# Patient Record
Sex: MALE | Race: WHITE | NOT HISPANIC OR LATINO | Employment: OTHER | URBAN - METROPOLITAN AREA
[De-identification: names, ages, dates, MRNs, and addresses within clinical notes are randomized per-mention and may not be internally consistent; named-entity substitution may affect disease eponyms.]

---

## 2017-01-23 ENCOUNTER — OFFICE VISIT (OUTPATIENT)
Dept: UROLOGY | Facility: CLINIC | Age: 77
End: 2017-01-23
Attending: UROLOGY
Payer: COMMERCIAL

## 2017-01-23 VITALS
SYSTOLIC BLOOD PRESSURE: 134 MMHG | WEIGHT: 130 LBS | HEIGHT: 63 IN | HEART RATE: 76 BPM | BODY MASS INDEX: 23.04 KG/M2 | DIASTOLIC BLOOD PRESSURE: 78 MMHG

## 2017-01-23 DIAGNOSIS — N13.30 HYDRONEPHROSIS, LEFT: ICD-10-CM

## 2017-01-23 DIAGNOSIS — C61 PROSTATE CANCER: Primary | ICD-10-CM

## 2017-01-23 PROCEDURE — 1126F AMNT PAIN NOTED NONE PRSNT: CPT | Mod: S$GLB,,, | Performed by: UROLOGY

## 2017-01-23 PROCEDURE — 3078F DIAST BP <80 MM HG: CPT | Mod: S$GLB,,, | Performed by: UROLOGY

## 2017-01-23 PROCEDURE — 99213 OFFICE O/P EST LOW 20 MIN: CPT | Mod: S$GLB,,, | Performed by: UROLOGY

## 2017-01-23 PROCEDURE — 1159F MED LIST DOCD IN RCRD: CPT | Mod: S$GLB,,, | Performed by: UROLOGY

## 2017-01-23 PROCEDURE — 1160F RVW MEDS BY RX/DR IN RCRD: CPT | Mod: S$GLB,,, | Performed by: UROLOGY

## 2017-01-23 PROCEDURE — 99999 PR PBB SHADOW E&M-EST. PATIENT-LVL III: CPT | Mod: PBBFAC,,, | Performed by: UROLOGY

## 2017-01-23 PROCEDURE — 1157F ADVNC CARE PLAN IN RCRD: CPT | Mod: S$GLB,,, | Performed by: UROLOGY

## 2017-01-23 PROCEDURE — 3075F SYST BP GE 130 - 139MM HG: CPT | Mod: S$GLB,,, | Performed by: UROLOGY

## 2017-01-23 NOTE — PROGRESS NOTES
"Subjective:      Aroldo Johnston Jr. is a 76 y.o. male who returns today regarding his prostate cancer, UPJ obstruction, stones.    He started ADT earlier this year; last [place] 3/3/16. He has no c/o today. Has also seen Dr. Caputo in interim and has opted for intermittent ADT.    Prostate Cancer  He states that he was diagnosed with prostate cancer in 2004 at [facility]; pT1c, [place] 3+3=6, PSA 16.5.  He states he was treated with radiation therapy with no known recurrence.  PSA sachin 0.97 in March 2009.  PSA slowly began to rise in 2011 and then more rapidly to 4.9 in Aug 2015, when he was referred to me.  He is here today to review new PSA.  He continues to deny bone pain and voiding problems.    UPJ Obstruction  He was diagnosed w/ left UPJ obstruction in 2014 after presenting with large renal stone.  He had robotic pyeloplasty in August 2014.  He did not FU for stent removal, which was noted on CT in September 2015.  Stent exchanged in OR on 9/24/15 and subsequently removed in clinic.  FU US demonstrated stable, likely chronic hydronephrosis.    The following portions of the patient's history were reviewed and updated as appropriate: allergies, current medications, past family history, past medical history, past social history, past surgical history and problem list.    Review of Systems  A comprehensive multipoint review of systems was negative except as otherwise stated in the HPI.     Objective:   Vitals:   Visit Vitals    /78 (BP Location: Right arm, Patient Position: Sitting, BP Method: Automatic)    Pulse 76    Ht 5' 3" (1.6 m)    Wt 59 kg (130 lb)    BMI 23.03 kg/m2       Physical Exam   General: alert and oriented, no acute distress  Respiratory: Symmetric expansion, non-labored breathing  Cardiovascular: regular rate and rhythm, no peripheral edema  Neuro: no gross deficits; poor memory  Psych: normal judgment and insight, normal mood/affect and non-anxious    Lab Review     Lab Results "   Component Value Date    PSA 4.9 (H) 08/19/2015    PSADIAG 0.03 09/16/2016       Assessment and Plan:   Prostate cancer  Elevated PSA  -- C/w biochemical recurrence s/p XRT  -- Has opted for intermittent ADT  -- FU in 2 months w/ repeat PSA    Hydronephrosis, left  -- S/p left UPJO w/ stable hydro on US following stent removal

## 2017-01-30 ENCOUNTER — TELEPHONE (OUTPATIENT)
Dept: GASTROENTEROLOGY | Facility: CLINIC | Age: 77
End: 2017-01-30

## 2017-02-01 ENCOUNTER — LAB VISIT (OUTPATIENT)
Dept: LAB | Facility: HOSPITAL | Age: 77
End: 2017-02-01
Attending: INTERNAL MEDICINE
Payer: COMMERCIAL

## 2017-02-01 ENCOUNTER — OFFICE VISIT (OUTPATIENT)
Dept: GASTROENTEROLOGY | Facility: CLINIC | Age: 77
End: 2017-02-01
Payer: COMMERCIAL

## 2017-02-01 VITALS
BODY MASS INDEX: 23.91 KG/M2 | DIASTOLIC BLOOD PRESSURE: 73 MMHG | HEART RATE: 71 BPM | WEIGHT: 134.94 LBS | SYSTOLIC BLOOD PRESSURE: 112 MMHG | HEIGHT: 63 IN

## 2017-02-01 DIAGNOSIS — K51.50 ULCERATIVE COLITIS, LEFT SIDED, WITHOUT COMPLICATIONS: Primary | ICD-10-CM

## 2017-02-01 DIAGNOSIS — K51.50 ULCERATIVE COLITIS, LEFT SIDED, WITHOUT COMPLICATIONS: ICD-10-CM

## 2017-02-01 LAB
ANION GAP SERPL CALC-SCNC: 6 MMOL/L
BASOPHILS # BLD AUTO: 0.04 K/UL
BASOPHILS NFR BLD: 0.6 %
BUN SERPL-MCNC: 10 MG/DL
CALCIUM SERPL-MCNC: 9.3 MG/DL
CHLORIDE SERPL-SCNC: 108 MMOL/L
CO2 SERPL-SCNC: 28 MMOL/L
CREAT SERPL-MCNC: 1 MG/DL
DIFFERENTIAL METHOD: ABNORMAL
EOSINOPHIL # BLD AUTO: 0.3 K/UL
EOSINOPHIL NFR BLD: 5.2 %
ERYTHROCYTE [DISTWIDTH] IN BLOOD BY AUTOMATED COUNT: 14.1 %
EST. GFR  (AFRICAN AMERICAN): >60 ML/MIN/1.73 M^2
EST. GFR  (NON AFRICAN AMERICAN): >60 ML/MIN/1.73 M^2
GLUCOSE SERPL-MCNC: 108 MG/DL
HCT VFR BLD AUTO: 39.1 %
HGB BLD-MCNC: 13.6 G/DL
LYMPHOCYTES # BLD AUTO: 2.7 K/UL
LYMPHOCYTES NFR BLD: 41.9 %
MCH RBC QN AUTO: 40.7 PG
MCHC RBC AUTO-ENTMCNC: 34.8 %
MCV RBC AUTO: 117 FL
MONOCYTES # BLD AUTO: 0.6 K/UL
MONOCYTES NFR BLD: 9.8 %
NEUTROPHILS # BLD AUTO: 2.7 K/UL
NEUTROPHILS NFR BLD: 42.2 %
PLATELET # BLD AUTO: 277 K/UL
PMV BLD AUTO: 9.5 FL
POTASSIUM SERPL-SCNC: 3.9 MMOL/L
RBC # BLD AUTO: 3.34 M/UL
SODIUM SERPL-SCNC: 142 MMOL/L
WBC # BLD AUTO: 6.4 K/UL

## 2017-02-01 PROCEDURE — 36415 COLL VENOUS BLD VENIPUNCTURE: CPT

## 2017-02-01 PROCEDURE — 1157F ADVNC CARE PLAN IN RCRD: CPT | Mod: S$GLB,,, | Performed by: INTERNAL MEDICINE

## 2017-02-01 PROCEDURE — 1126F AMNT PAIN NOTED NONE PRSNT: CPT | Mod: S$GLB,,, | Performed by: INTERNAL MEDICINE

## 2017-02-01 PROCEDURE — 99212 OFFICE O/P EST SF 10 MIN: CPT | Mod: S$GLB,,, | Performed by: INTERNAL MEDICINE

## 2017-02-01 PROCEDURE — 85025 COMPLETE CBC W/AUTO DIFF WBC: CPT

## 2017-02-01 PROCEDURE — 3078F DIAST BP <80 MM HG: CPT | Mod: S$GLB,,, | Performed by: INTERNAL MEDICINE

## 2017-02-01 PROCEDURE — 99999 PR PBB SHADOW E&M-EST. PATIENT-LVL III: CPT | Mod: PBBFAC,,, | Performed by: INTERNAL MEDICINE

## 2017-02-01 PROCEDURE — 80048 BASIC METABOLIC PNL TOTAL CA: CPT

## 2017-02-01 PROCEDURE — 1159F MED LIST DOCD IN RCRD: CPT | Mod: S$GLB,,, | Performed by: INTERNAL MEDICINE

## 2017-02-01 PROCEDURE — 3074F SYST BP LT 130 MM HG: CPT | Mod: S$GLB,,, | Performed by: INTERNAL MEDICINE

## 2017-02-01 PROCEDURE — 1160F RVW MEDS BY RX/DR IN RCRD: CPT | Mod: S$GLB,,, | Performed by: INTERNAL MEDICINE

## 2017-02-01 RX ORDER — MESALAMINE 800 MG/1
1600 TABLET, DELAYED RELEASE ORAL 2 TIMES DAILY
Qty: 360 TABLET | Refills: 3 | Status: SHIPPED | OUTPATIENT
Start: 2017-02-01 | End: 2017-02-13 | Stop reason: SDUPTHER

## 2017-02-01 RX ORDER — MESALAMINE 4 G/60ML
4 SUSPENSION RECTAL NIGHTLY
Qty: 840 ML | Refills: 0 | Status: SHIPPED | OUTPATIENT
Start: 2017-02-01 | End: 2017-02-15

## 2017-02-01 NOTE — MR AVS SNAPSHOT
[facility]  [address]  [facility] [address]  Phone: [phone]  Fax: [phone]                  Aroldo Johnston Jr.   2017 1:00 PM   Office Visit    Description:  Male : 1940   Provider:  Stoney Bal MD   Department:  [facility] - Gastroenterology           Diagnoses this Visit        Comments    Ulcerative colitis, left sided, without complications    -  Primary            To Do List           Future Appointments        Provider Department Dept Phone    3/13/2017 2:00 PM LAB, [facility] [phone]    3/20/2017 3:00 PM Jonatan Rm MD [facility] Urology [phone]      Goals (5 Years of Data)     None       These Medications        Disp Refills Start End    mesalamine (ROWASA) 4 gram/60 mL Enem 840 mL 0 2017 2/15/2017    Place 60 mLs (4 g total) rectally every evening. - Rectal    Pharmacy: [facility] [address] [place] [address] [address] AT [facility] Ph #: [phone]       mesalamine (ASACOL HD) 800 mg TbEC 360 tablet 3 2017    Take 2 tablets (1,600 mg total) by mouth 2 (two) times daily. - Oral    Pharmacy: [facility] [address] [place] [address] [address] [facility] Ph #: [phone]         [facility] On Call     [facility] Nurse Care Line -  Assistance  Registered nurses in the [facility] provide clinical advisement, health education, appointment booking, and other advisory services.  Call for this free service at [phone].             Medications           Message regarding Medications     Verify the changes and/or additions to your medication regime listed below are the same as discussed with your clinician today.  If any of these changes or additions are incorrect, please notify your healthcare provider.        START taking these NEW medications        Refills    mesalamine (ROWASA) 4 gram/60 mL Enem 0    Sig:  "Place 60 mLs (4 g total) rectally every evening.    Class: Print    Route: Rectal    mesalamine (ASACOL HD) 800 mg TbEC 3    Sig: Take 2 tablets (1,600 mg total) by mouth 2 (two) times daily.    Class: Print    Route: Oral           Verify that the below list of medications is an accurate representation of the medications you are currently taking.  If none reported, the list may be blank. If incorrect, please contact your healthcare provider. Carry this list with you in case of emergency.           Current Medications     atorvastatin (LIPITOR) 40 MG tablet Take 1 tablet (40 mg total) by mouth once daily.    lamivudine-zidovudine 150-300mg (COMBIVIR) 150-300 mg Tab Take 1 tablet by mouth 2 (two) times daily.    indinavir (CRIXIVAN) 400 MG capsule Take 2 capsules (800 mg total) by mouth 3 (three) times daily.    mesalamine (ASACOL HD) 800 mg TbEC Take 2 tablets (1,600 mg total) by mouth 2 (two) times daily.    mesalamine (ROWASA) 4 gram/60 mL Enem Place 60 mLs (4 g total) rectally every evening.    mirtazapine (REMERON) 15 MG tablet Take 1-2 tablets (15-30 mg total) by mouth every evening.           Clinical Reference Information           Vital Signs - Last Recorded  Most recent update: 2/1/2017  1:06 PM by Yane Delatorre MA    BP Pulse Ht Wt BMI    112/73 71 5' 3" (1.6 m) 61.2 kg (134 lb 14.7 oz) 23.9 kg/m2      Blood Pressure          Most Recent Value    BP  112/73      Allergies as of 2/1/2017     No Known Allergies      Immunizations Administered on Date of Encounter - 2/1/2017     None      "

## 2017-02-07 NOTE — PROGRESS NOTES
[facility] Established Patient Visit    Reason for Visit:    Chief Complaint   Patient presents with    Follow-up     ulcerative colitis       PCP: Cory Gardner    HPI:  Aroldo Johnston Jr. is a 76 y.o. male here for follow-up of ulcerative colitis.  I saw him 12/1/16 for this problem and recommended we start Asacol HD 1600 mg PO bid as well as 2 weeks of Rowasa enemas.  He did not get either of these medications and states that it was because the pharmacy never alerted him that they were available for pick-up.  His bowel movements have become more liquid.  He has urgency but denies blood in the stools.  He is still on no medications for UC.  His symptoms are generally about the same as during our last visit.        Endoscopic Hx:  COLONOSCOPY - 6/26/15 by Dr. Benz for UC; to cecum, good prep; mild to moderate proctitis with rectal stricture (biopsies show active colitis with mucosal ulceration and architectural distortion, no dysplasia).   FLEX-SIG - 8/28/13 done in clinic by Dr. Brown, inflammation to 35 cm from entry (biopsies chronic active colitis with reactive epithelial changes and fibrinopurulent exudate; negative for granulomas and dysplasia)  FLEX-SIG - 2/7/11 done in clinic by Dr. Brown for UC; to 45 cm, normal mucosa above 40 cm; active inflammation without ulcers distally from that point (biopsies of rectum, chronic active colitis, no dysplasia)  COLONOSCOPY - 8/12/09 by Dr. Brown for rectal bleeding and h/o UC and polyps; to cecum, good prep; normal transverse and ascending (biopsies normal colon); inflammation in the rectum, sigmoid and descending colon (biopsies moderate chronic active colitis, no dysplasia); 5 mm rectal polyp removed, but looks like there was just inflammation and no dysplasia.  FLEX-SIG - 12/15/08 done in clinic by Dr. Brown for proctitis; to 60 cm; continuous inflammation from dentate to 55 cm from entry (biopsies severe chronic active  colitis with ulcer, no dysplasia)  FLEX-SIG - 11/22/04 by Dr. Wu for UC; to sigmoid, fair prep; decreased vascular pattern to 30 cm (biopsies acute and chronic colitis consistent with IBD); impression was quiescent ulcerative proctosigmoiditis.  COLONOSCOPY - 9/23/03 by Dr. Tay for UC; moderately active colitis in the rectum and sigmoid (biopsies mild to moderate severe chronic inflammation with crypt distortion, crypt atrophy, and crypt abscess, no dysplasia); normal descending through cecum (biopsies in cecum, hepatic flexure, transverse colon, and descending colon all normal)  FLEX-SIG - 8/13/02 by Dr. Tay for UC; moderate proctitis in the distal rectum (biopsies superficial erosion and intense acute and chronic inflammation and cryptitis with crypt distortion, no dysplasia); 5-6 mm rectal polyp removed (biopsies with benign polypoid mucosa with intense superficial erosion, subacute and chronic inflammation, no dysplasia); normal proximally to 60 cm.         ROS:  Constitutional: No fevers, chills, No weight loss, normal appetite  GI: see HPI      PMHX:  has a past medical history of Anemia; Anxiety; Coronary artery disease; Depression; HIV (human immunodeficiency virus infection); Hypertension; Inflammatory bowel disease; Malignant neoplasm of colon, unspecified site; Malignant neoplasm of colon, unspecified site; Prostate cancer; and STD (sexually transmitted disease).    PSHX:  has a past surgical history that includes Neck surgery; cardiac stents; Colon surgery; and radiation.    The patient's social and family histories were reviewed by me and updated in the appropriate section of the electronic medical record.    Review of patient's allergies indicates:  No Known Allergies    Prior to Admission medications    Medication Sig Start Date End Date Taking? Authorizing Provider   atorvastatin (LIPITOR) 40 MG tablet Take 1 tablet (40 mg total) by mouth once daily. 11/8/16  Yes Cory Gardner MD  "  lamivudine-zidovudine 150-300mg (COMBIVIR) 150-300 mg Tab Take 1 tablet by mouth 2 (two) times daily. 8/22/16  Yes Cory Gardner MD   indinavir (CRIXIVAN) 400 MG capsule Take 2 capsules (800 mg total) by mouth 3 (three) times daily. 8/22/16   Cory Gardner MD   mesalamine (ASACOL HD) 800 mg TbEC Take 2 tablets (1,600 mg total) by mouth 2 (two) times daily. 2/1/17 2/1/18  Stoney Bal MD   mesalamine (ROWASA) 4 gram/60 mL Enem Place 60 mLs (4 g total) rectally every evening. 2/1/17 2/15/17  Stoney Bal MD   mirtazapine (REMERON) 15 MG tablet Take 1-2 tablets (15-30 mg total) by mouth every evening. 11/30/16 11/30/17  Cory Gardner MD         Objective Findings:  Vital Signs:  Visit Vitals    /73    Pulse 71    Ht 5' 3" (1.6 m)    Wt 61.2 kg (134 lb 14.7 oz)    BMI 23.9 kg/m2    Body mass index is 23.9 kg/(m^2).    Physical Exam:  General Appearance:  Well appearing in no acute distress, appears stated age  Head:  Normocephalic, atraumatic  Eyes:  No scleral icterus or pallor, EOMI        Labs:  Lab Results   Component Value Date    WBC 6.40 02/01/2017    HGB 13.6 (L) 02/01/2017    HCT 39.1 (L) 02/01/2017     (H) 02/01/2017     02/01/2017     Lab Results   Component Value Date     02/01/2017    K 3.9 02/01/2017     02/01/2017    CO2 28 02/01/2017     02/01/2017    BUN 10 02/01/2017    CREATININE 1.0 02/01/2017    CALCIUM 9.3 02/01/2017    PROT 7.4 08/25/2016    ALBUMIN 3.7 08/25/2016    BILITOT 1.2 (H) 08/25/2016    ALKPHOS 82 08/25/2016    AST 41 (H) 08/25/2016    ALT 51 (H) 08/25/2016             Assessment:  Aroldo Johnston JrJohnnie is a 76 y.o. 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 He has had this disease for many years and has been followed by multiple physicians in the GI and colorectal surgery departments. He has a long history of treatment " non-compliance. He has been treated with Lialda, Colazal, Azulfidine, Rowasa, and Canasa. He did not respond well to the Lialda, Colazal, or Asulfidine. Does not appear to have used steroids. No colon surgery. Biopsies from colon have never found any dysplasia. His last colonoscopy was June 2015 showing proctitis and rectal stricture. He has a history of prostate cancer and had radiation therapy. He still has symptoms consistent with active disease as well as incontinence and could also be related to his history of rectal stricture and prostate cancer treatment. He is currently not on treatment.  I intended him to take 2 weeks of Rowasa and to start Asacol HD 1600 mg PO bid, but he did not start this (unclear why).      Recommendations:  1. I will resend prescriptions for the Rowasa and Asacol HD to start.  Rx printed and handed to him in person.   2. BMP and CBC today    RTC 2-3 months      Order summary:  Orders Placed This Encounter   Procedures    CBC auto differential    Basic metabolic panel         Stoney Bal MD

## 2017-02-09 RX ORDER — LAMIVUDINE AND ZIDOVUDINE 150; 300 MG/1; MG/1
1 TABLET, FILM COATED ORAL 2 TIMES DAILY
Qty: 60 TABLET | Refills: 0 | OUTPATIENT
Start: 2017-02-09

## 2017-02-09 NOTE — TELEPHONE ENCOUNTER
----- Message from Ashely Jansen sent at 2/9/2017  3:11 PM CST -----  Contact: Self  X   1st Request  _  2nd Request  _  3rd Request    Please refill the medication(s) listed below. Please call the [id number] when the prescription(s) is ready for  at the phone number [phone] .    Medication #1 indinavir (CRIXIVAN) 400 MG capsule    Medication #2 lamivudine-zidovudine 150-300mg (COMBIVIR) 150-300 mg Tab      Preferred Pharmacy: [facility] at [phone]

## 2017-02-13 DIAGNOSIS — K51.50 ULCERATIVE COLITIS, LEFT SIDED, WITHOUT COMPLICATIONS: ICD-10-CM

## 2017-02-14 RX ORDER — MESALAMINE 800 MG/1
1600 TABLET, DELAYED RELEASE ORAL 2 TIMES DAILY
Qty: 360 TABLET | Refills: 3 | Status: SHIPPED | OUTPATIENT
Start: 2017-02-14 | End: 2017-07-12

## 2017-03-01 ENCOUNTER — TELEPHONE (OUTPATIENT)
Dept: GASTROENTEROLOGY | Facility: CLINIC | Age: 77
End: 2017-03-01

## 2017-03-01 NOTE — TELEPHONE ENCOUNTER
----- Message from Dionne Hess MA sent at 3/1/2017 10:22 AM CST -----  Contact: [phone]  Valeriano   Pt states that he needs to discuss getting in sooner than May but would prefer to discus the detail of his medical matter with a nurse.  [phone]

## 2017-03-01 NOTE — TELEPHONE ENCOUNTER
Returned patient's call. Patient is requesting a sooner appointment with Dr. Bal. Patient colitis is getting worse. Patient's gastro appointment rescheduled with Dr. Bal. Appointment mailed to address on file.

## 2017-03-20 ENCOUNTER — LAB VISIT (OUTPATIENT)
Dept: LAB | Facility: OTHER | Age: 77
End: 2017-03-20
Attending: UROLOGY
Payer: COMMERCIAL

## 2017-03-20 ENCOUNTER — TELEPHONE (OUTPATIENT)
Dept: UROLOGY | Facility: CLINIC | Age: 77
End: 2017-03-20

## 2017-03-20 ENCOUNTER — OFFICE VISIT (OUTPATIENT)
Dept: UROLOGY | Facility: CLINIC | Age: 77
End: 2017-03-20
Attending: UROLOGY
Payer: COMMERCIAL

## 2017-03-20 VITALS
HEART RATE: 67 BPM | WEIGHT: 134 LBS | BODY MASS INDEX: 23.74 KG/M2 | HEIGHT: 63 IN | SYSTOLIC BLOOD PRESSURE: 134 MMHG | DIASTOLIC BLOOD PRESSURE: 67 MMHG

## 2017-03-20 DIAGNOSIS — C61 PROSTATE CANCER: ICD-10-CM

## 2017-03-20 DIAGNOSIS — C61 PROSTATE CANCER: Primary | ICD-10-CM

## 2017-03-20 LAB — COMPLEXED PSA SERPL-MCNC: 0.86 NG/ML

## 2017-03-20 PROCEDURE — 1126F AMNT PAIN NOTED NONE PRSNT: CPT | Mod: S$GLB,,, | Performed by: UROLOGY

## 2017-03-20 PROCEDURE — 99999 PR PBB SHADOW E&M-EST. PATIENT-LVL III: CPT | Mod: PBBFAC,,, | Performed by: UROLOGY

## 2017-03-20 PROCEDURE — 1157F ADVNC CARE PLAN IN RCRD: CPT | Mod: S$GLB,,, | Performed by: UROLOGY

## 2017-03-20 PROCEDURE — 3078F DIAST BP <80 MM HG: CPT | Mod: S$GLB,,, | Performed by: UROLOGY

## 2017-03-20 PROCEDURE — 1160F RVW MEDS BY RX/DR IN RCRD: CPT | Mod: S$GLB,,, | Performed by: UROLOGY

## 2017-03-20 PROCEDURE — 84153 ASSAY OF PSA TOTAL: CPT

## 2017-03-20 PROCEDURE — 99214 OFFICE O/P EST MOD 30 MIN: CPT | Mod: S$GLB,,, | Performed by: UROLOGY

## 2017-03-20 PROCEDURE — 36415 COLL VENOUS BLD VENIPUNCTURE: CPT

## 2017-03-20 PROCEDURE — 3075F SYST BP GE 130 - 139MM HG: CPT | Mod: S$GLB,,, | Performed by: UROLOGY

## 2017-03-20 PROCEDURE — 1159F MED LIST DOCD IN RCRD: CPT | Mod: S$GLB,,, | Performed by: UROLOGY

## 2017-03-20 NOTE — TELEPHONE ENCOUNTER
----- Message from Jacquelyn Chavarria MA sent at 3/20/2017  4:39 PM CDT -----  Contact: self  [phone]  States he saw you today and would like to discuss a circumcision with you.

## 2017-03-20 NOTE — PROGRESS NOTES
"Subjective:      Aroldo Johnston Jr. is a 76 y.o. male who returns today regarding his prostate cancer, UPJ obstruction, stones.    He has no new c/o today. PSA drawn this AM.    Prostate Cancer  He states that he was diagnosed with prostate cancer in 2004 at [facility]; pT1c, [place] 3+3=6, PSA 16.5.  He states he was treated with radiation therapy with no known recurrence.  PSA sachin 0.97 in March 2009.  PSA slowly began to rise in 2011 and then more rapidly to 4.9 in Aug 2015, when he was referred to me.  Began ADT in 2016 and later opted for intermittent therapy. Last injection 3/3/16.     UPJ Obstruction  He was diagnosed w/ left UPJ obstruction in 2014 after presenting with large renal stone.  He had robotic pyeloplasty in August 2014.  He did not FU for stent removal, which was noted on CT in September 2015.  Stent exchanged in OR on 9/24/15 and subsequently removed in clinic.  FU US demonstrated stable, likely chronic hydronephrosis.    The following portions of the patient's history were reviewed and updated as appropriate: allergies, current medications, past family history, past medical history, past social history, past surgical history and problem list.    Review of Systems  A comprehensive multipoint review of systems was negative except as otherwise stated in the HPI.     Objective:   Vitals:   /67 (BP Location: Right arm, Patient Position: Sitting, BP Method: Automatic)  Pulse 67  Ht 5' 3" (1.6 m)  Wt 60.8 kg (134 lb)  BMI 23.74 kg/m2    Physical Exam   General: alert and oriented, no acute distress  Respiratory: Symmetric expansion, non-labored breathing  Cardiovascular: regular rate and rhythm, no peripheral edema  Neuro: no gross deficits; poor memory  Psych: normal judgment and insight, normal mood/affect and non-anxious    Lab Review     Lab Results   Component Value Date    PSA 4.9 (H) 08/19/2015    PSADIAG 0.03 09/16/2016   Pending today      Assessment and Plan:   Prostate " cancer  Elevated PSA  -- C/w biochemical recurrence s/p XRT  -- Has opted for intermittent ADT - last treatment 3/2016  -- FU PSA today; FU w/ repeat in 6 months    Hydronephrosis, left  -- S/p left UPJO w/ stable hydro on US following stent removal

## 2017-03-21 ENCOUNTER — TELEPHONE (OUTPATIENT)
Dept: GASTROENTEROLOGY | Facility: CLINIC | Age: 77
End: 2017-03-21

## 2017-03-22 ENCOUNTER — OFFICE VISIT (OUTPATIENT)
Dept: GASTROENTEROLOGY | Facility: CLINIC | Age: 77
End: 2017-03-22
Payer: COMMERCIAL

## 2017-03-22 VITALS
DIASTOLIC BLOOD PRESSURE: 73 MMHG | HEIGHT: 63 IN | WEIGHT: 135.13 LBS | SYSTOLIC BLOOD PRESSURE: 166 MMHG | BODY MASS INDEX: 23.94 KG/M2 | HEART RATE: 68 BPM

## 2017-03-22 DIAGNOSIS — K51.50 ULCERATIVE COLITIS, LEFT SIDED, WITHOUT COMPLICATIONS: Primary | ICD-10-CM

## 2017-03-22 PROCEDURE — 99999 PR PBB SHADOW E&M-EST. PATIENT-LVL III: CPT | Mod: PBBFAC,,, | Performed by: INTERNAL MEDICINE

## 2017-03-22 PROCEDURE — 1159F MED LIST DOCD IN RCRD: CPT | Mod: S$GLB,,, | Performed by: INTERNAL MEDICINE

## 2017-03-22 PROCEDURE — 1157F ADVNC CARE PLAN IN RCRD: CPT | Mod: S$GLB,,, | Performed by: INTERNAL MEDICINE

## 2017-03-22 PROCEDURE — 99213 OFFICE O/P EST LOW 20 MIN: CPT | Mod: S$GLB,,, | Performed by: INTERNAL MEDICINE

## 2017-03-22 PROCEDURE — 3077F SYST BP >= 140 MM HG: CPT | Mod: S$GLB,,, | Performed by: INTERNAL MEDICINE

## 2017-03-22 PROCEDURE — 1126F AMNT PAIN NOTED NONE PRSNT: CPT | Mod: S$GLB,,, | Performed by: INTERNAL MEDICINE

## 2017-03-22 PROCEDURE — 3078F DIAST BP <80 MM HG: CPT | Mod: S$GLB,,, | Performed by: INTERNAL MEDICINE

## 2017-03-22 PROCEDURE — 1160F RVW MEDS BY RX/DR IN RCRD: CPT | Mod: S$GLB,,, | Performed by: INTERNAL MEDICINE

## 2017-03-22 NOTE — MR AVS SNAPSHOT
[facility] - Gastroenterology  [address]  [facility] [address]  Phone: [phone]  Fax: [phone]                  Aroldo Johnston Jr.   3/22/2017 11:30 AM   Office Visit    Description:  Male : 1940   Provider:  Stoney Bal MD   Department:  [facility]                To Do List           Future Appointments        Provider Department Dept Phone    4/3/2017 3:15 PM Jonatan Rm MD [facility] Urolog [phone]    2017 11:00 AM Stoney Bal MD [facility] Gastroenterology [phone]    2017 2:00 PM LAB, [facility] [phone]    2017 2:15 PM Jonatan Rm MD [facility] [phone]      Goals (5 Years of Data)     None      [facility]     [facility] Care Line -  Assistance  Registered nurses in the [facility] provide clinical advisement, health education, appointment booking, and other advisory services.  Call for this free service at [phone].             Medications           Message regarding Medications     Verify the changes and/or additions to your medication regime listed below are the same as discussed with your clinician today.  If any of these changes or additions are incorrect, please notify your healthcare provider.             Verify that the below list of medications is an accurate representation of the medications you are currently taking.  If none reported, the list may be blank. If incorrect, please contact your healthcare provider. Carry this list with you in case of emergency.           Current Medications     atorvastatin (LIPITOR) 40 MG tablet Take 1 tablet (40 mg total) by mouth once daily.    indinavir (CRIXIVAN) 400 MG capsule Take 2 capsules (800 mg total) by mouth 3 (three) times daily.    lamivudine-zidovudine 150-300mg (COMBIVIR) 150-300 mg Tab Take 1 tablet by mouth 2 (two) times daily.    mesalamine (ASACOL HD) 800 mg TbEC Take 2  "tablets (1,600 mg total) by mouth 2 (two) times daily.    mirtazapine (REMERON) 15 MG tablet Take 1-2 tablets (15-30 mg total) by mouth every evening.           Clinical Reference Information           Your Vitals Were     BP Pulse Height Weight BMI    166/73 68 5' 3" (1.6 m) 61.3 kg (135 lb 2.3 oz) 23.94 kg/m2      Blood Pressure          Most Recent Value    BP  (!)  166/73      Allergies as of 3/22/2017     No Known Allergies      Immunizations Administered on Date of Encounter - 3/22/2017     None      Language Assistance Services     ATTENTION: Language assistance services are available, free of charge. Please call [phone].      ATENCIÓN: Si liorla april, tiene a ren disposición servicios gratuitos de asistencia lingüística. Llame al [phone].     KORI Ý: N?u b?n nói Ti?ng Vi?t, có các d?ch v? h? tr? ngôn ng? mi?n phí dành cho b?n. G?i s? [phone].         [facility] complies with applicable Federal civil rights laws and does not discriminate on the basis of race, color, national origin, age, disability, or sex.        "

## 2017-03-22 NOTE — TELEPHONE ENCOUNTER
I spoke to Dr. Rm, and he and pt had not spoken about circumcision at his appt.  Per Dr. Rm, Pt needs to make appt to discuss with Dr. Rm and to schedule this as well if pt wishes to proceed.  I left message on recorder again today asking pt to call back to schedule appt if interested in this.

## 2017-03-22 NOTE — PROGRESS NOTES
[facility] Established Patient Visit    Reason for Visit:    Chief Complaint   Patient presents with    Follow-up    Ulcerative Colitis       PCP: Cory Gardner    HPI:  Aroldo Johnston Jr. is a 76 y.o. male here for follow-up of left-sided ulcerative colitis.  I saw him about 6 weeks ago.  I intended for him to start Asacol HD and Rowasa enemas.  He has still not gotten the medications.  That was the second time I had prescribed them for him.  The first time, he told me that the pharmacist never told him that the medications were available; therefore, I gave him printed prescriptions to bring to his pharmacist.  He tells me today that he brought those prescriptions to his pharmacy but was told that they would be about $1000 to fill.  It is unclear if the medications were not covered or required a prior authorization.  Our office was not contacted by the pharmacy or the patient regarding the problem.  He continues to have frequent bowel movements, but reports that he is no longer have as many episodes of water stools.  Most of his stools are now soft to loose.  He is unsure if blood is present in the stool.  No new complaints.        Endoscopic Hx:  COLONOSCOPY - 6/26/15 by Dr. Benz for UC; to cecum, good prep; mild to moderate proctitis with rectal stricture (biopsies show active colitis with mucosal ulceration and architectural distortion, no dysplasia).   FLEX-SIG - 8/28/13 done in clinic by Dr. Brown, inflammation to 35 cm from entry (biopsies chronic active colitis with reactive epithelial changes and fibrinopurulent exudate; negative for granulomas and dysplasia)  FLEX-SIG - 2/7/11 done in clinic by Dr. Brown for UC; to 45 cm, normal mucosa above 40 cm; active inflammation without ulcers distally from that point (biopsies of rectum, chronic active colitis, no dysplasia)  COLONOSCOPY - 8/12/09 by Dr. Brown for rectal bleeding and h/o UC and polyps; to cecum, good prep;  normal transverse and ascending (biopsies normal colon); inflammation in the rectum, sigmoid and descending colon (biopsies moderate chronic active colitis, no dysplasia); 5 mm rectal polyp removed, but looks like there was just inflammation and no dysplasia.  FLEX-SIG - 12/15/08 done in clinic by Dr. Brown for proctitis; to 60 cm; continuous inflammation from dentate to 55 cm from entry (biopsies severe chronic active colitis with ulcer, no dysplasia)  FLEX-SIG - 11/22/04 by Dr. Wu for UC; to sigmoid, fair prep; decreased vascular pattern to 30 cm (biopsies acute and chronic colitis consistent with IBD); impression was quiescent ulcerative proctosigmoiditis.  COLONOSCOPY - 9/23/03 by Dr. Tay for UC; moderately active colitis in the rectum and sigmoid (biopsies mild to moderate severe chronic inflammation with crypt distortion, crypt atrophy, and crypt abscess, no dysplasia); normal descending through cecum (biopsies in cecum, hepatic flexure, transverse colon, and descending colon all normal)  FLEX-SIG - 8/13/02 by Dr. Tay for UC; moderate proctitis in the distal rectum (biopsies superficial erosion and intense acute and chronic inflammation and cryptitis with crypt distortion, no dysplasia); 5-6 mm rectal polyp removed (biopsies with benign polypoid mucosa with intense superficial erosion, subacute and chronic inflammation, no dysplasia); normal proximally to 60 cm.         ROS:  Constitutional: No fevers, chills, No weight loss, normal appetite  Ophtho: No vision changes or pain  GI: see HPI  Derm: No rash or lesions  Heme: No lymphadenopathy, No bruising  MSK: No arthritis or joint swelling      PMHX:  has a past medical history of Anemia; Anxiety; Coronary artery disease; Depression; HIV (human immunodeficiency virus infection); Hypertension; Inflammatory bowel disease; Malignant neoplasm of colon, unspecified site; Malignant neoplasm of colon, unspecified site; Prostate cancer; and STD (sexually  "transmitted disease).    PSHX:  has a past surgical history that includes Neck surgery; cardiac stents; Colon surgery; and radiation.    The patient's social and family histories were reviewed by me and updated in the appropriate section of the electronic medical record.    Review of patient's allergies indicates:  No Known Allergies    Prior to Admission medications    Medication Sig Start Date End Date Taking? Authorizing Provider   atorvastatin (LIPITOR) 40 MG tablet Take 1 tablet (40 mg total) by mouth once daily. 11/8/16  Yes Cory Gardner MD   indinavir (CRIXIVAN) 400 MG capsule Take 2 capsules (800 mg total) by mouth 3 (three) times daily. 8/22/16  Yes Cory Gardner MD   lamivudine-zidovudine 150-300mg (COMBIVIR) 150-300 mg Tab Take 1 tablet by mouth 2 (two) times daily. 8/22/16  Yes Cory Gardner MD   mesalamine (ASACOL HD) 800 mg TbEC Take 2 tablets (1,600 mg total) by mouth 2 (two) times daily. 2/14/17 2/14/18 Yes Stoney Bal MD   mirtazapine (REMERON) 15 MG tablet Take 1-2 tablets (15-30 mg total) by mouth every evening. 11/30/16 11/30/17  Cory Gardner MD         Objective Findings:  Vital Signs:  BP (!) 166/73  Pulse 68  Ht 5' 3" (1.6 m)  Wt 61.3 kg (135 lb 2.3 oz)  BMI 23.94 kg/m2 Body mass index is 23.94 kg/(m^2).    Physical Exam:  General Appearance:  Well appearing in no acute distress, appears stated age  Head:  Normocephalic, atraumatic  Eyes:  No scleral icterus or pallor, EOMI      Labs:  Lab Results   Component Value Date    WBC 6.40 02/01/2017    HGB 13.6 (L) 02/01/2017    HCT 39.1 (L) 02/01/2017     (H) 02/01/2017     02/01/2017     Lab Results   Component Value Date     02/01/2017    K 3.9 02/01/2017     02/01/2017    CO2 28 02/01/2017     02/01/2017    BUN 10 02/01/2017    CREATININE 1.0 02/01/2017    CALCIUM 9.3 02/01/2017    PROT 7.4 08/25/2016    ALBUMIN 3.7 08/25/2016    BILITOT 1.2 (H) 08/25/2016    ALKPHOS 82 " 08/25/2016    AST 41 (H) 08/25/2016    ALT 51 (H) 08/25/2016               Assessment:  Aroldo Johnston Jr. is a 76 y.o. 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 He has had this disease for many years and has been followed by multiple physicians in the GI and colorectal surgery departments. He has a long history of treatment non-compliance. He has been treated with Lialda, Colazal, Azulfidine, Rowasa, and Canasa. He did not respond well to the Lialda, Colazal, or Asulfidine. Does not appear to have used steroids. No colon surgery. Biopsies from colon have never found any dysplasia. His last colonoscopy was June 2015 showing proctitis and rectal stricture. He has a history of prostate cancer and had radiation therapy. He still has symptoms consistent with active disease as well as incontinence and could also be related to his history of rectal stricture and prostate cancer treatment. He is currently not on treatment. At the last two clinic visits, I tried to start him on 2 weeks of Rowasa and Asacol HD 1600 mg PO bid, but for various reasons, he did not get this medications either time.  This past time, it may have something to do with insurance coverage or approval.      Recommendations:  I would still like for him to start on the medications I recommended.  We will contact his pharmacy for more information and details regarding the problems with the prescriptions and will work to get them approved if that is necessary.    Follow-up in 8 weeks        Stoney Bal MD

## 2017-03-23 ENCOUNTER — TELEPHONE (OUTPATIENT)
Dept: GASTROENTEROLOGY | Facility: CLINIC | Age: 77
End: 2017-03-23

## 2017-03-23 NOTE — TELEPHONE ENCOUNTER
----- Message from Jacquelyn Chavarria MA sent at 3/23/2017 10:28 AM CDT -----  Contact: self  [phone]  States he is asking you to call for a prior authorization on the rejection for mesalamine tabs.  States he called and left this message but forgot to leave the claim rejection#  [id number].    Phone#  [phone]    Call when done.

## 2017-03-23 NOTE — TELEPHONE ENCOUNTER
PA is not needed for Mesalamine, medication is excluded. Insurance will only cover Lialda, sulfasalazine, and balsalazide. Insurance company informed patient tried all three medications.     Information routed to Dr. Bal.

## 2017-03-23 NOTE — TELEPHONE ENCOUNTER
----- Message from Moiz Thomas sent at 3/23/2017 10:47 AM CDT -----  Contact: Pt:[phone]  Pt called and states he would like to have  send over a medical exception form to his insurance company. Pt states you can call this number to his insurance company [phone]

## 2017-03-24 ENCOUNTER — TELEPHONE (OUTPATIENT)
Dept: GASTROENTEROLOGY | Facility: CLINIC | Age: 77
End: 2017-03-24

## 2017-03-24 NOTE — TELEPHONE ENCOUNTER
----- Message from Moiz Thomas sent at 3/23/2017  4:52 PM CDT -----  Contact: Pt:[phone]  Pt called and states he is returning a call to the nurse.

## 2017-04-03 ENCOUNTER — OFFICE VISIT (OUTPATIENT)
Dept: UROLOGY | Facility: CLINIC | Age: 77
End: 2017-04-03
Attending: UROLOGY
Payer: COMMERCIAL

## 2017-04-03 VITALS
HEIGHT: 63 IN | HEART RATE: 77 BPM | SYSTOLIC BLOOD PRESSURE: 126 MMHG | WEIGHT: 135 LBS | DIASTOLIC BLOOD PRESSURE: 75 MMHG | BODY MASS INDEX: 23.92 KG/M2

## 2017-04-03 DIAGNOSIS — N48.1 BALANITIS: Primary | ICD-10-CM

## 2017-04-03 PROCEDURE — 1160F RVW MEDS BY RX/DR IN RCRD: CPT | Mod: S$GLB,,, | Performed by: UROLOGY

## 2017-04-03 PROCEDURE — 1157F ADVNC CARE PLAN IN RCRD: CPT | Mod: S$GLB,,, | Performed by: UROLOGY

## 2017-04-03 PROCEDURE — 99213 OFFICE O/P EST LOW 20 MIN: CPT | Mod: S$GLB,,, | Performed by: UROLOGY

## 2017-04-03 PROCEDURE — 1126F AMNT PAIN NOTED NONE PRSNT: CPT | Mod: S$GLB,,, | Performed by: UROLOGY

## 2017-04-03 PROCEDURE — 1159F MED LIST DOCD IN RCRD: CPT | Mod: S$GLB,,, | Performed by: UROLOGY

## 2017-04-03 PROCEDURE — 99999 PR PBB SHADOW E&M-EST. PATIENT-LVL III: CPT | Mod: PBBFAC,,, | Performed by: UROLOGY

## 2017-04-03 PROCEDURE — 3078F DIAST BP <80 MM HG: CPT | Mod: S$GLB,,, | Performed by: UROLOGY

## 2017-04-03 PROCEDURE — 3074F SYST BP LT 130 MM HG: CPT | Mod: S$GLB,,, | Performed by: UROLOGY

## 2017-04-03 RX ORDER — NYSTATIN 100000 U/G
CREAM TOPICAL 2 TIMES DAILY
Qty: 1 TUBE | Refills: 1 | Status: SHIPPED | OUTPATIENT
Start: 2017-04-03 | End: 2017-07-03 | Stop reason: SDUPTHER

## 2017-04-03 NOTE — PROGRESS NOTES
"Subjective:      Aroldo Johnston Jr. is a 76 y.o. male who returns today regarding new c/o balanitis. He is seen regularly regarding his prostate cancer, UPJ obstruction, and stones.    Prior  history as per below - not addressed today.    His c/o today is penile irritation under foreskin. He isn't clear how long this has been present. He hasn't treated it. He thinks he might need a circumcision.    Prostate Cancer  He states that [place] [facility] [place], [place] 3+3=6, PSA 16.5.  He states he was treated with radiation therapy with no known recurrence.  PSA sachin 0.97 in March 2009.  PSA slowly began to rise in 2011 and then more rapidly to 4.9 in Aug 2015, when he was referred to me.  Began ADT in 2016 and later opted for intermittent therapy. Last injection 3/3/16.     UPJ Obstruction  He was diagnosed w/ left UPJ obstruction in 2014 after presenting with large renal stone.  He had robotic pyeloplasty in August 2014.  He did not FU for stent removal, which was noted on CT in September 2015.  Stent exchanged in OR on 9/24/15 and subsequently removed in clinic.  FU US demonstrated stable, likely chronic hydronephrosis.    The following portions of the patient's history were reviewed and updated as appropriate: allergies, current medications, past family history, past medical history, past social history, past surgical history and problem list.    Review of Systems  A comprehensive multipoint review of systems was negative except as otherwise stated in the HPI.     Objective:   Vitals:   /75 (BP Location: Left arm, Patient Position: Sitting, BP Method: Automatic)  Pulse 77  Ht 5' 3" (1.6 m)  Wt 61.2 kg (135 lb)  BMI 23.91 kg/m2    Physical Exam   General: alert and oriented, no acute distress  Respiratory: Symmetric expansion, non-labored breathing  Cardiovascular: regular rate and rhythm, no peripheral edema  Penis: Uncircumcised without phimosis; erythema of glans " and underside of foreskin  Neuro: no gross deficits; poor memory  Psych: normal judgment and insight, normal mood/affect and non-anxious    Lab Review     Lab Results   Component Value Date    PSA 4.9 (H) 08/19/2015    PSADIAG 0.86 03/20/2017   Pending today      Assessment and Plan:   Balanitis  -- Nystatin cream BID    Prostate cancer  Elevated PSA  -- Per prior note    Hydronephrosis, left  -- Per prior note    FU 6 mos as scheduled

## 2017-06-28 ENCOUNTER — TELEPHONE (OUTPATIENT)
Dept: GASTROENTEROLOGY | Facility: CLINIC | Age: 77
End: 2017-06-28

## 2017-06-28 NOTE — TELEPHONE ENCOUNTER
----- Message from Sun Maddoxkert sent at 6/28/2017  4:10 PM CDT -----  Contact: self - [phone]  Valeriano - wants to reschedule his appt - please leave date and time on his machine - please call patient at [phone]

## 2017-07-03 ENCOUNTER — OFFICE VISIT (OUTPATIENT)
Dept: UROLOGY | Facility: CLINIC | Age: 77
End: 2017-07-03
Attending: UROLOGY
Payer: COMMERCIAL

## 2017-07-03 VITALS
BODY MASS INDEX: 22.44 KG/M2 | WEIGHT: 126.63 LBS | HEART RATE: 72 BPM | DIASTOLIC BLOOD PRESSURE: 76 MMHG | HEIGHT: 63 IN | SYSTOLIC BLOOD PRESSURE: 104 MMHG

## 2017-07-03 DIAGNOSIS — N48.1 BALANITIS: Primary | ICD-10-CM

## 2017-07-03 PROCEDURE — 1159F MED LIST DOCD IN RCRD: CPT | Mod: S$GLB,,, | Performed by: UROLOGY

## 2017-07-03 PROCEDURE — 99213 OFFICE O/P EST LOW 20 MIN: CPT | Mod: S$GLB,,, | Performed by: UROLOGY

## 2017-07-03 PROCEDURE — 99999 PR PBB SHADOW E&M-EST. PATIENT-LVL III: CPT | Mod: PBBFAC,,, | Performed by: UROLOGY

## 2017-07-03 PROCEDURE — 1126F AMNT PAIN NOTED NONE PRSNT: CPT | Mod: S$GLB,,, | Performed by: UROLOGY

## 2017-07-03 RX ORDER — NYSTATIN 100000 U/G
CREAM TOPICAL 2 TIMES DAILY
Qty: 1 TUBE | Refills: 1 | Status: SHIPPED | OUTPATIENT
Start: 2017-07-03 | End: 2017-09-18

## 2017-07-03 NOTE — PROGRESS NOTES
"Subjective:      Aroldo Johnston Jr. is a 76 y.o. male who returns today regarding new c/o balanitis. He is seen regularly regarding his prostate cancer, UPJ obstruction, and stones.    Prior  history as per below - not addressed today.    His returns today regarding his balanitis. He states it resolved with the nystatin cream in April but has now recurred. He is interested in circumcision to prevent further recurrence.    Prostate Cancer  He states that he was diagnosed with prostate cancer in 2004 at [facility] [place]; pT1c, [place] 3+3=6, PSA 16.5.  He states he was treated with radiation therapy with no known recurrence.  PSA sachin 0.97 in March 2009.  PSA slowly began to rise in 2011 and then more rapidly to 4.9 in Aug 2015, when he was referred to me.  Began ADT in 2016 and later opted for intermittent therapy. Last injection 3/3/16.     UPJ Obstruction  He was diagnosed w/ left UPJ obstruction in 2014 after presenting with large renal stone.  He had robotic pyeloplasty in August 2014.  He did not FU for stent removal, which was noted on CT in September 2015.  Stent exchanged in OR on 9/24/15 and subsequently removed in clinic.  FU US demonstrated stable, likely chronic hydronephrosis.    The following portions of the patient's history were reviewed and updated as appropriate: allergies, current medications, past family history, past medical history, past social history, past surgical history and problem list.    Review of Systems  A comprehensive multipoint review of systems was negative except as otherwise stated in the HPI.     Objective:   Vitals:   /76 (BP Location: Left arm, Patient Position: Sitting, BP Method: Automatic)   Pulse 72   Ht 5' 3" (1.6 m)   Wt 57.5 kg (126 lb 10.5 oz)   BMI 22.44 kg/m²     Physical Exam   General: alert and oriented, no acute distress  Respiratory: Symmetric expansion, non-labored breathing  Cardiovascular: regular rate and rhythm, no peripheral edema  Penis: Uncircumcised " without phimosis; erythema of glans and underside of foreskin  Neuro: no gross deficits; poor memory  Psych: normal judgment and insight, normal mood/affect and non-anxious    Lab Review   Component PSA DIAGNOSTIC   Latest Ref Rng & Units 0.00 - 4.00 ng/mL   3/20/2017 0.86   9/16/2016 0.03   7/1/2016 0.05   3/29/2016 0.31   1/6/2016 8.0 (H)       Assessment and Plan:   Balanitis  -- Restart nystatin cream BID  -- Agree w/ circumcision to prevent recurrence  -- Will FU in 3 weeks to assess response and discuss surgery    Prostate cancer  Elevated PSA  -- Per prior note    Hydronephrosis, left  -- Per prior note

## 2017-07-12 ENCOUNTER — TELEPHONE (OUTPATIENT)
Dept: GASTROENTEROLOGY | Facility: CLINIC | Age: 77
End: 2017-07-12

## 2017-07-12 ENCOUNTER — OFFICE VISIT (OUTPATIENT)
Dept: GASTROENTEROLOGY | Facility: CLINIC | Age: 77
End: 2017-07-12
Payer: COMMERCIAL

## 2017-07-12 VITALS
DIASTOLIC BLOOD PRESSURE: 74 MMHG | BODY MASS INDEX: 23.83 KG/M2 | WEIGHT: 134.5 LBS | HEIGHT: 63 IN | HEART RATE: 67 BPM | SYSTOLIC BLOOD PRESSURE: 128 MMHG

## 2017-07-12 DIAGNOSIS — K51.50 LEFT SIDED ULCERATIVE COLITIS, WITHOUT COMPLICATIONS: Primary | ICD-10-CM

## 2017-07-12 PROCEDURE — 99214 OFFICE O/P EST MOD 30 MIN: CPT | Mod: S$GLB,,, | Performed by: NURSE PRACTITIONER

## 2017-07-12 PROCEDURE — 99999 PR PBB SHADOW E&M-EST. PATIENT-LVL III: CPT | Mod: PBBFAC,,, | Performed by: NURSE PRACTITIONER

## 2017-07-12 PROCEDURE — 1126F AMNT PAIN NOTED NONE PRSNT: CPT | Mod: S$GLB,,, | Performed by: NURSE PRACTITIONER

## 2017-07-12 PROCEDURE — 1159F MED LIST DOCD IN RCRD: CPT | Mod: S$GLB,,, | Performed by: NURSE PRACTITIONER

## 2017-07-12 NOTE — PROGRESS NOTES
[facility] Established Patient Visit    Reason for Visit:  The encounter diagnosis was Left sided ulcerative colitis, without complications.    PCP: Cory Gardner    HPI:  This is a 76 y.o. male here for f/u of left sided UC  Followed by Dr. Bal-last visit in 3/2017. Previously seen by Dr. Benz, Dr. Stephens, JENIFER Samson PA, Dr. Troy, Dr. Brown.   compliance has been an issue for him.   Hx UC x 15-20 years.   Was RX Asacol, but was having problems with insurance coverage. RX called into [facility] today. They will work on getting coverage.  Hx of Lialda, Colazal, Azulfidine. Pt reports they did not work for him.    Has not been on medication for several months.  Reports his symptoms have improved since his last GI clinic visit.  Currently with 4 bristol type 4 BM/day. No blood. No pus. No mucus. Some urgency.  No abd pain.  Hx of HIV. Followed by ID. Takes antivirals daily.     Reflux - no  Dysphagia/odynophagia - no   GI bleeding - denies hematochezia, hematemesis, melena, BRBPR, black/tarry stools, and coffee ground emesis  NSAID usage - none    ROS:  Constitutional: No fevers, no chills, No unintentional weight loss, no fatigue,   ENT: No allergies  CV: No chest pain, no palpitations, no perif. edema, no sob on exertion  Pulm: No cough, No shortness of breath, no wheezes, no sputum  Ophtho: No vision changes  GI: see HPI; also no nausea, no vomiting, no change in appetite  Derm: No rash  Heme: No lymphadenopathy, No bruising  MSK: No arthritis, no muscle pain, no muscle weakness  : No dysuria, No hematuria  Endo: No hot or cold intolerance  Neuro: No syncope, No seizure,     PMHX:  has a past medical history of Anemia; Anxiety; Coronary artery disease; Depression; HIV (human immunodeficiency virus infection); Hypertension; Inflammatory bowel disease; Malignant neoplasm of colon, unspecified site; Malignant neoplasm of colon, unspecified site; Prostate cancer; and STD (sexually  "transmitted disease).    PSHX:  has a past surgical history that includes Neck surgery; cardiac stents; Colon surgery; and radiation.    The patient's social and family histories were reviewed by me and updated in the appropriate section of the electronic medical record.    Review of patient's allergies indicates:  No Known Allergies    Current Outpatient Prescriptions   Medication Sig    indinavir (CRIXIVAN) 400 MG capsule Take 2 capsules (800 mg total) by mouth 3 (three) times daily.    lamivudine-zidovudine 150-300mg (COMBIVIR) 150-300 mg Tab Take 1 tablet by mouth 2 (two) times daily.    nystatin (MYCOSTATIN) cream Apply topically 2 (two) times daily.     Current Facility-Administered Medications   Medication    degarelix (FIRMAGON) injection 80 mg    leuprolide (6 month) (ELIGARD) injection 45 mg         Objective Findings:    Vital Signs:  /74   Pulse 67   Ht 5' 3" (1.6 m)   Wt 61 kg (134 lb 7.7 oz)   BMI 23.82 kg/m²  Body mass index is 23.82 kg/m².    Physical Exam:  General Appearance: Well appearing in no acute distress  Head:   Normocephalic, without obvious abnormality  Eyes:    No scleral icterus, EOMI  Throat: Lips, mucosa, and tongue normal; teeth and gums normal  Lungs: CTA bilaterally in anterior and posterior fields, no wheezes, no crackles.  Heart:  Regular rate and rhythm, S1, S2 normal, no murmurs heard  Abdomen: Soft, non tender, non distended with positive bowel sounds in all four quadrants. No hepatosplenomegaly, ascites, or mass  Extremities:    2+ radial pulses, no clubbing, cyanosis or edema  Skin: No rash to exposed areas  Neurologic: A&Ox4      Labs:  Lab Results   Component Value Date    WBC 6.40 02/01/2017    HGB 13.6 (L) 02/01/2017    HCT 39.1 (L) 02/01/2017     02/01/2017    CHOL 164 08/19/2015    TRIG 137 08/19/2015    HDL 37 (L) 08/19/2015    ALT 51 (H) 08/25/2016    AST 41 (H) 08/25/2016     02/01/2017    K 3.9 02/01/2017     02/01/2017    " CREATININE 1.0 02/01/2017    BUN 10 02/01/2017    CO2 28 02/01/2017    TSH 2.581 08/25/2016    PSA 4.9 (H) 08/19/2015    INR 1.0 03/16/2014    HGBA1C 5.2 08/19/2015       Endoscopy:   COLONOSCOPY - 6/26/15 by Dr. Benz for UC; to cecum, good prep; mild to moderate proctitis with rectal stricture (biopsies show active colitis with mucosal ulceration and architectural distortion, no dysplasia).    FLEX-SIG - 8/28/13 done in clinic by Dr. Brown, inflammation to 35 cm from entry (biopsies chronic active colitis with reactive epithelial changes and fibrinopurulent exudate; negative for granulomas and dysplasia)  FLEX-SIG - 2/7/11 done in clinic by Dr. Brown for UC; to 45 cm, normal mucosa above 40 cm; active inflammation without ulcers distally from that point (biopsies of rectum, chronic active colitis, no dysplasia)  COLONOSCOPY - 8/12/09 by Dr. Brown for rectal bleeding and h/o UC and polyps; to cecum, good prep; normal transverse and ascending (biopsies normal colon); inflammation in the rectum, sigmoid and descending colon (biopsies moderate chronic active colitis, no dysplasia); 5 mm rectal polyp removed, but looks like there was just inflammation and no dysplasia.  FLEX-SIG - 12/15/08 done in clinic by Dr. Brown for proctitis; to 60 cm; continuous inflammation from dentate to 55 cm from entry (biopsies severe chronic active colitis with ulcer, no dysplasia)  FLEX-SIG - 11/22/04 by Dr. Wu for UC; to sigmoid, fair prep; decreased vascular pattern to 30 cm (biopsies acute and chronic colitis consistent with IBD); impression was quiescent ulcerative proctosigmoiditis.  COLONOSCOPY - 9/23/03 by Dr. Tay for UC; moderately active colitis in the rectum and sigmoid (biopsies mild to moderate severe chronic inflammation with crypt distortion, crypt atrophy, and crypt abscess, no dysplasia); normal descending through cecum (biopsies in cecum, hepatic flexure, transverse colon, and descending colon all  normal)  FLEX-SIG - 8/13/02 by Dr. Tay for UC; moderate proctitis in the distal rectum (biopsies superficial erosion and intense acute and chronic inflammation and cryptitis with crypt distortion, no dysplasia); 5-6 mm rectal polyp removed (biopsies with benign polypoid mucosa with intense superficial erosion, subacute and chronic inflammation, no dysplasia); normal proximally to 60 cm.      Assessment:    1. Left sided ulcerative colitis, without complications          Recommendations:  1. UC- s/s improved. Start Asacol as previously RX. [facility] currently working on coverage. Pt informed, he will receive a call from our department with the outcome. Understanding verbalized.    Return in about 2 months (around 9/12/2017) for UC w/Dr. Bal.    Order summary:       Thank you for allowing me to participate in the care of Aroldo Aragon, IZABELLA, FNP-C

## 2017-07-12 NOTE — TELEPHONE ENCOUNTER
Prescription called in to [facility]:    Asacol  mg TbEC,   Take 2 tablets (1,600 mg total) by mouth 2 times daily  Disp. 3620 tablets, 3 refills    Pedraza/pharmacist verbally read back order.

## 2017-07-24 ENCOUNTER — OFFICE VISIT (OUTPATIENT)
Dept: UROLOGY | Facility: CLINIC | Age: 77
End: 2017-07-24
Attending: UROLOGY
Payer: COMMERCIAL

## 2017-07-24 VITALS
HEART RATE: 71 BPM | WEIGHT: 134.25 LBS | HEIGHT: 63 IN | DIASTOLIC BLOOD PRESSURE: 82 MMHG | SYSTOLIC BLOOD PRESSURE: 142 MMHG | BODY MASS INDEX: 23.79 KG/M2

## 2017-07-24 DIAGNOSIS — N48.1 BALANITIS: Primary | ICD-10-CM

## 2017-07-24 PROCEDURE — 1126F AMNT PAIN NOTED NONE PRSNT: CPT | Mod: S$GLB,,, | Performed by: UROLOGY

## 2017-07-24 PROCEDURE — 99999 PR PBB SHADOW E&M-EST. PATIENT-LVL III: CPT | Mod: PBBFAC,,, | Performed by: UROLOGY

## 2017-07-24 PROCEDURE — 99213 OFFICE O/P EST LOW 20 MIN: CPT | Mod: S$GLB,,, | Performed by: UROLOGY

## 2017-07-24 PROCEDURE — 1159F MED LIST DOCD IN RCRD: CPT | Mod: S$GLB,,, | Performed by: UROLOGY

## 2017-07-24 RX ORDER — FLUCONAZOLE 150 MG/1
150 TABLET ORAL DAILY
Qty: 1 TABLET | Refills: 0 | Status: SHIPPED | OUTPATIENT
Start: 2017-07-24 | End: 2017-07-25

## 2017-07-24 NOTE — PROGRESS NOTES
"Subjective:      Aroldo Johnston Jr. is a 76 y.o. male who returns today regarding new c/o balanitis. He is seen regularly regarding his prostate cancer, UPJ obstruction, and stones.    Prior  history as per below - not addressed today.    His returns today regarding his balanitis. Was given rx for nystatin 3 weeks ago but has only been using 4 times per week. He had previous episode that did resolve w/ nystatin cream in April. He remains interested in circumcision to prevent further recurrence.    Prostate Cancer  He states that he was diagnosed with prostate cancer in 2004 at [facility] [place]; pT1c, [place] 3+3=6, PSA 16.5.  He states he was treated with radiation therapy with no known recurrence.  PSA sachin 0.97 in March 2009.  PSA slowly began to rise in 2011 and then more rapidly to 4.9 in Aug 2015, when he was referred to me.  Began ADT in 2016 and later opted for intermittent therapy. Last injection 3/3/16.     UPJ Obstruction  He was diagnosed w/ left UPJ obstruction in 2014 after presenting with large renal stone.  He had robotic pyeloplasty in August 2014.  He did not FU for stent removal, which was noted on CT in September 2015.  Stent exchanged in OR on 9/24/15 and subsequently removed in clinic.  FU US demonstrated stable, likely chronic hydronephrosis.    The following portions of the patient's history were reviewed and updated as appropriate: allergies, current medications, past family history, past medical history, past social history, past surgical history and problem list.    Review of Systems  A comprehensive multipoint review of systems was negative except as otherwise stated in the HPI.     Objective:   Vitals:   BP (!) 142/82 (BP Location: Left arm, Patient Position: Sitting, BP Method: Automatic)   Pulse 71   Ht 5' 3" (1.6 m)   Wt 60.9 kg (134 lb 4.2 oz)   BMI 23.78 kg/m²     Physical Exam   General: alert and oriented, no acute distress  Respiratory: Symmetric expansion, non-labored " breathing  Cardiovascular: regular rate and rhythm, no peripheral edema  Penis: Uncircumcised without phimosis; erythema of glans and underside of foreskin, unchanged from prior  Neuro: no gross deficits; poor memory  Psych: normal judgment and insight, normal mood/affect and non-anxious    Lab Review   Component PSA DIAGNOSTIC   Latest Ref Rng & Units 0.00 - 4.00 ng/mL   3/20/2017 0.86   9/16/2016 0.03   7/1/2016 0.05   3/29/2016 0.31   1/6/2016 8.0 (H)       Assessment and Plan:   Balanitis  -- Restart nystatin cream BID - instructed on proper dosing  -- Fluconazole 150mg x 1  -- He remains interested in circumcision to prevent recurrence - will discuss at FU in 1 month    Prostate cancer  Elevated PSA  -- Per prior note    Hydronephrosis, left  -- Per prior note

## 2017-07-27 ENCOUNTER — LAB VISIT (OUTPATIENT)
Dept: LAB | Facility: OTHER | Age: 77
End: 2017-07-27
Attending: INTERNAL MEDICINE
Payer: COMMERCIAL

## 2017-07-27 ENCOUNTER — OFFICE VISIT (OUTPATIENT)
Dept: INTERNAL MEDICINE | Facility: CLINIC | Age: 77
End: 2017-07-27
Attending: INTERNAL MEDICINE
Payer: COMMERCIAL

## 2017-07-27 VITALS
WEIGHT: 132.5 LBS | SYSTOLIC BLOOD PRESSURE: 132 MMHG | BODY MASS INDEX: 23.48 KG/M2 | HEIGHT: 63 IN | DIASTOLIC BLOOD PRESSURE: 76 MMHG | HEART RATE: 75 BPM

## 2017-07-27 DIAGNOSIS — R29.898 LEG WEAKNESS, BILATERAL: Primary | ICD-10-CM

## 2017-07-27 DIAGNOSIS — R29.898 LEG WEAKNESS, BILATERAL: ICD-10-CM

## 2017-07-27 LAB
ALBUMIN SERPL BCP-MCNC: 3.7 G/DL
ALP SERPL-CCNC: 69 U/L
ALT SERPL W/O P-5'-P-CCNC: 33 U/L
ANION GAP SERPL CALC-SCNC: 10 MMOL/L
AST SERPL-CCNC: 40 U/L
BASOPHILS # BLD AUTO: 0.04 K/UL
BASOPHILS NFR BLD: 0.5 %
BILIRUB SERPL-MCNC: 2.1 MG/DL
BUN SERPL-MCNC: 15 MG/DL
CALCIUM SERPL-MCNC: 9.4 MG/DL
CHLORIDE SERPL-SCNC: 108 MMOL/L
CK SERPL-CCNC: 863 U/L
CO2 SERPL-SCNC: 23 MMOL/L
CREAT SERPL-MCNC: 1.2 MG/DL
CRP SERPL-MCNC: 3.6 MG/L
DIFFERENTIAL METHOD: ABNORMAL
EOSINOPHIL # BLD AUTO: 0.3 K/UL
EOSINOPHIL NFR BLD: 4 %
ERYTHROCYTE [DISTWIDTH] IN BLOOD BY AUTOMATED COUNT: 14.1 %
ERYTHROCYTE [SEDIMENTATION RATE] IN BLOOD BY WESTERGREN METHOD: 47 MM/HR
EST. GFR  (AFRICAN AMERICAN): >60 ML/MIN/1.73 M^2
EST. GFR  (NON AFRICAN AMERICAN): 59 ML/MIN/1.73 M^2
GLUCOSE SERPL-MCNC: 100 MG/DL
HCT VFR BLD AUTO: 39.1 %
HGB BLD-MCNC: 13.8 G/DL
LYMPHOCYTES # BLD AUTO: 2.8 K/UL
LYMPHOCYTES NFR BLD: 37.2 %
MCH RBC QN AUTO: 41.4 PG
MCHC RBC AUTO-ENTMCNC: 35.3 G/DL
MCV RBC AUTO: 117 FL
MONOCYTES # BLD AUTO: 0.8 K/UL
MONOCYTES NFR BLD: 10.6 %
NEUTROPHILS # BLD AUTO: 3.5 K/UL
NEUTROPHILS NFR BLD: 47.4 %
PLATELET # BLD AUTO: 283 K/UL
PMV BLD AUTO: 9.4 FL
POTASSIUM SERPL-SCNC: 3.6 MMOL/L
PROT SERPL-MCNC: 8 G/DL
RBC # BLD AUTO: 3.33 M/UL
SODIUM SERPL-SCNC: 141 MMOL/L
TSH SERPL DL<=0.005 MIU/L-ACNC: 1.89 UIU/ML
VIT B12 SERPL-MCNC: 288 PG/ML
WBC # BLD AUTO: 7.45 K/UL

## 2017-07-27 PROCEDURE — 82747 ASSAY OF FOLIC ACID RBC: CPT

## 2017-07-27 PROCEDURE — 84443 ASSAY THYROID STIM HORMONE: CPT

## 2017-07-27 PROCEDURE — 99999 PR PBB SHADOW E&M-EST. PATIENT-LVL III: CPT | Mod: PBBFAC,,, | Performed by: INTERNAL MEDICINE

## 2017-07-27 PROCEDURE — 80053 COMPREHEN METABOLIC PANEL: CPT

## 2017-07-27 PROCEDURE — 82550 ASSAY OF CK (CPK): CPT

## 2017-07-27 PROCEDURE — 82607 VITAMIN B-12: CPT

## 2017-07-27 PROCEDURE — 1159F MED LIST DOCD IN RCRD: CPT | Mod: S$GLB,,, | Performed by: INTERNAL MEDICINE

## 2017-07-27 PROCEDURE — 86140 C-REACTIVE PROTEIN: CPT

## 2017-07-27 PROCEDURE — 85025 COMPLETE CBC W/AUTO DIFF WBC: CPT

## 2017-07-27 PROCEDURE — 86592 SYPHILIS TEST NON-TREP QUAL: CPT

## 2017-07-27 PROCEDURE — 36415 COLL VENOUS BLD VENIPUNCTURE: CPT

## 2017-07-27 PROCEDURE — 1126F AMNT PAIN NOTED NONE PRSNT: CPT | Mod: S$GLB,,, | Performed by: INTERNAL MEDICINE

## 2017-07-27 PROCEDURE — 85651 RBC SED RATE NONAUTOMATED: CPT

## 2017-07-27 PROCEDURE — 99213 OFFICE O/P EST LOW 20 MIN: CPT | Mod: S$GLB,,, | Performed by: INTERNAL MEDICINE

## 2017-07-27 RX ORDER — MIRTAZAPINE 15 MG/1
15 TABLET, FILM COATED ORAL NIGHTLY
COMMUNITY
End: 2017-07-27 | Stop reason: SDUPTHER

## 2017-07-27 RX ORDER — MIRTAZAPINE 15 MG/1
15 TABLET, FILM COATED ORAL NIGHTLY
Qty: 90 TABLET | Refills: 3 | Status: SHIPPED | OUTPATIENT
Start: 2017-07-27 | End: 2018-04-04 | Stop reason: SDUPTHER

## 2017-07-27 NOTE — PROGRESS NOTES
"Subjective:       Patient ID: Aroldo Johnston Jr. is a 76 y.o. male.    Chief Complaint: Annual Exam    Here for urgent visit    Leg weakness for the past 6-12 months. He works out several days a week at [facility]. He has not changed his exercise regimen in 20 years. He reports it is harder to go up stairs. He states symptoms do no prohibit him from doing what he needs to do but is noticable. He denies any difficulty arising from seated chair. He denies arthralgias, claudication, numbness/tingling, low back or neck pain, upper extremity symptoms, tremors, dizziness, falls, unsteady gait. He is not taking his B12 supplementation. Hx of occasional fecal incontinence related to UC, no acute issues. Hx of urinary incontinence s/p prostate CA. He does note loss of hair on bilateral feet and ankle.     He would like refill of sleep aid.           Review of Systems    Objective:      Vitals:    07/27/17 1342   BP: 132/76   Pulse: 75   Weight: 60.1 kg (132 lb 7.9 oz)   Height: 5' 3" (1.6 m)      Physical Exam   Constitutional: He is oriented to person, place, and time. He appears well-developed and well-nourished. He does not have a sickly appearance. No distress.   HENT:   Head: Normocephalic and atraumatic.   Eyes: Conjunctivae and EOM are normal. Right eye exhibits no discharge. Left eye exhibits no discharge. No scleral icterus.   Pulmonary/Chest: Effort normal. No respiratory distress.   Abdominal: Normal appearance. He exhibits no distension.   Neurological: He is alert and oriented to person, place, and time. He has normal strength. He displays no atrophy and no tremor. No cranial nerve deficit or sensory deficit. He exhibits normal muscle tone. He displays a negative Romberg sign. Gait normal.   FTN intact.  Some incoordination with left alternating hand movements.   Skin: Skin is warm and dry. He is not diaphoretic.   Psychiatric: He has a normal mood and affect. His speech is normal.       Assessment:       1. Leg " weakness, bilateral        Plan:       Aroldo was seen today for annual exam.    Diagnoses and all orders for this visit:    Leg weakness, bilateral  -     Vitamin B12; Future  -     RPR; Future  -     Folate RBC; Future  -     CBC auto differential; Future  -     Comprehensive metabolic panel; Future  -     TSH; Future  -     CK; Future  -     Sedimentation rate, manual; Future  -     C-reactive protein; Future    Other orders  -     mirtazapine (REMERON) 15 MG tablet; Take 1 tablet (15 mg total) by mouth every evening.         RTC in 2 months or sooner prn      Side effects of medication(s) were discussed in detail and patient voiced understanding.  Patient will call back for any issues or complications.

## 2017-07-28 LAB — RPR SER QL: NORMAL

## 2017-07-31 LAB — FOLATE RBC-MCNC: 488 NG/ML

## 2017-08-10 ENCOUNTER — TELEPHONE (OUTPATIENT)
Dept: INTERNAL MEDICINE | Facility: CLINIC | Age: 77
End: 2017-08-10

## 2017-08-10 DIAGNOSIS — R74.8 ELEVATED CPK: Primary | ICD-10-CM

## 2017-08-10 RX ORDER — TRAZODONE HYDROCHLORIDE 50 MG/1
50-100 TABLET ORAL NIGHTLY
Qty: 90 TABLET | Refills: 1 | Status: SHIPPED | OUTPATIENT
Start: 2017-08-10 | End: 2018-04-19

## 2017-08-10 NOTE — TELEPHONE ENCOUNTER
Please let patient know that overall his labs look good but his muscle enzymes are elevated. This along wth his leg weakness can be caused by mirtazepine. I would like for him to stop this medication and I would like to repeat his CPK level in 4 weeks. I have sent in an alternative sleeping medication for him to use.

## 2017-08-10 NOTE — TELEPHONE ENCOUNTER
----- Message from Jacquelyn Ortega sent at 8/10/2017  4:01 PM CDT -----  Contact: pt  _  1st Request  _  2nd Request  _  3rd Request        Who: pt    Why: pt returned the nurse's phone call regarding labs. Call pt     What Number to Call Back:[phone]    When to Expect a call back: (With in 24 hours)

## 2017-08-10 NOTE — TELEPHONE ENCOUNTER
Pt stated that he would like to speak with Dr. Gardner regarding his recent lab results. I did speak with the pt regarding the note below, Pt understood but would like further information .

## 2017-08-23 ENCOUNTER — OFFICE VISIT (OUTPATIENT)
Dept: INTERNAL MEDICINE | Facility: CLINIC | Age: 77
End: 2017-08-23
Attending: INTERNAL MEDICINE
Payer: COMMERCIAL

## 2017-08-23 VITALS
HEIGHT: 63 IN | WEIGHT: 133.63 LBS | HEART RATE: 88 BPM | DIASTOLIC BLOOD PRESSURE: 64 MMHG | BODY MASS INDEX: 23.68 KG/M2 | SYSTOLIC BLOOD PRESSURE: 128 MMHG

## 2017-08-23 DIAGNOSIS — R29.898 LEG WEAKNESS, BILATERAL: Primary | ICD-10-CM

## 2017-08-23 PROCEDURE — 3008F BODY MASS INDEX DOCD: CPT | Mod: S$GLB,,, | Performed by: INTERNAL MEDICINE

## 2017-08-23 PROCEDURE — 3078F DIAST BP <80 MM HG: CPT | Mod: S$GLB,,, | Performed by: INTERNAL MEDICINE

## 2017-08-23 PROCEDURE — 99999 PR PBB SHADOW E&M-EST. PATIENT-LVL III: CPT | Mod: PBBFAC,,, | Performed by: INTERNAL MEDICINE

## 2017-08-23 PROCEDURE — 3074F SYST BP LT 130 MM HG: CPT | Mod: S$GLB,,, | Performed by: INTERNAL MEDICINE

## 2017-08-23 PROCEDURE — 99213 OFFICE O/P EST LOW 20 MIN: CPT | Mod: S$GLB,,, | Performed by: INTERNAL MEDICINE

## 2017-08-23 PROCEDURE — 1126F AMNT PAIN NOTED NONE PRSNT: CPT | Mod: S$GLB,,, | Performed by: INTERNAL MEDICINE

## 2017-08-23 PROCEDURE — 1159F MED LIST DOCD IN RCRD: CPT | Mod: S$GLB,,, | Performed by: INTERNAL MEDICINE

## 2017-08-23 NOTE — PATIENT INSTRUCTIONS
In 8 weeks from today plan is to take a break from the gym for 3 consecutive days then get repeat labs.  Take over the counter B12 supplementation (cyancobalamin) 1000mg daily.

## 2017-08-23 NOTE — PROGRESS NOTES
"Subjective:       Patient ID: Aroldo Johnston Jr. is a 76 y.o. male.    Chief Complaint: Extremity Weakness (f/u)    Here for f/u    Last seen 1 month prior where he endorses a several month Hx of fatigued thighs. HPI from that visit: "Leg weakness for the past 6-12 months. He works out several days a week at [facility]. He has not changed his exercise regimen in 20 years. He reports it is harder to go up stairs. He states symptoms do no prohibit him from doing what he needs to do but is noticable. He denies any difficulty arising from seated chair. He denies arthralgias, claudication, numbness/tingling, low back or neck pain, upper extremity symptoms, tremors, dizziness, falls, unsteady gait. He is not taking his B12 supplementation."  Symptoms unchanged. B12 levels came back low at 288. Has not started supplementing.         Review of Systems    Objective:      Vitals:    08/23/17 1311   BP: 128/64   Pulse: 88   Weight: 60.6 kg (133 lb 9.6 oz)   Height: 5' 3" (1.6 m)      Physical Exam   Constitutional: He is oriented to person, place, and time. He appears well-developed and well-nourished. He does not have a sickly appearance. No distress.   HENT:   Head: Normocephalic and atraumatic.   Eyes: Conjunctivae and EOM are normal. Right eye exhibits no discharge. Left eye exhibits no discharge. No scleral icterus.   Cardiovascular:   Pulses:       Dorsalis pedis pulses are 2+ on the right side, and 2+ on the left side.   Pulmonary/Chest: Effort normal. No respiratory distress.   Abdominal: Normal appearance. He exhibits no distension.   Neurological: He is alert and oriented to person, place, and time.   Skin: Skin is warm and dry. He is not diaphoretic.   Psychiatric: He has a normal mood and affect. His speech is normal.       Assessment:       1. Leg weakness, bilateral        Plan:       Aroldo was seen today for extremity weakness.    Diagnoses and all orders for this visit:    Leg pain, bilateral  Take B12 and repeat " labs in 8 weeks with repeat CPK after holding exercise for 3 days.  -     X-Ray Lumbar Spine Ap Lateral w/Flex Ext; Future               Side effects of medication(s) were discussed in detail and patient voiced understanding.  Patient will call back for any issues or complications.

## 2017-08-24 ENCOUNTER — HOSPITAL ENCOUNTER (OUTPATIENT)
Dept: RADIOLOGY | Facility: OTHER | Age: 77
Discharge: HOME | End: 2017-08-24
Attending: INTERNAL MEDICINE
Payer: COMMERCIAL

## 2017-08-24 DIAGNOSIS — R29.898 LEG WEAKNESS, BILATERAL: ICD-10-CM

## 2017-08-24 PROCEDURE — 72100 X-RAY EXAM L-S SPINE 2/3 VWS: CPT | Mod: 26,,, | Performed by: RADIOLOGY

## 2017-08-24 PROCEDURE — 72100 X-RAY EXAM L-S SPINE 2/3 VWS: CPT | Mod: TC

## 2017-08-24 PROCEDURE — 72120 X-RAY BEND ONLY L-S SPINE: CPT | Mod: 26,,, | Performed by: RADIOLOGY

## 2017-08-28 RX ORDER — MIRTAZAPINE 15 MG/1
15 TABLET, FILM COATED ORAL NIGHTLY
Qty: 90 TABLET | Refills: 3 | OUTPATIENT
Start: 2017-08-28

## 2017-08-28 NOTE — TELEPHONE ENCOUNTER
----- Message from Blanche Barbour sent at 8/28/2017  8:20 AM CDT -----  Contact: SUSAN LIMON JR. [[id number]]  _x  1st Request  _  2nd Request  _  3rd Request    Please refill the medication(s) listed below.     Medication #1 mirtazapine (REMERON) 15 MG tablet      Preferred Pharmacy:  [facility] [address] [address]  Phone: [phone] Fax: [phone]

## 2017-09-18 ENCOUNTER — OFFICE VISIT (OUTPATIENT)
Dept: UROLOGY | Facility: CLINIC | Age: 77
End: 2017-09-18
Attending: UROLOGY
Payer: COMMERCIAL

## 2017-09-18 VITALS
HEART RATE: 82 BPM | SYSTOLIC BLOOD PRESSURE: 115 MMHG | WEIGHT: 133.5 LBS | BODY MASS INDEX: 23.65 KG/M2 | HEIGHT: 63 IN | DIASTOLIC BLOOD PRESSURE: 75 MMHG

## 2017-09-18 DIAGNOSIS — N13.30 HYDRONEPHROSIS, LEFT: ICD-10-CM

## 2017-09-18 DIAGNOSIS — C61 PROSTATE CANCER: Primary | ICD-10-CM

## 2017-09-18 DIAGNOSIS — R97.20 ELEVATED PSA: ICD-10-CM

## 2017-09-18 DIAGNOSIS — N48.1 BALANITIS: ICD-10-CM

## 2017-09-18 PROCEDURE — 3078F DIAST BP <80 MM HG: CPT | Mod: S$GLB,,, | Performed by: UROLOGY

## 2017-09-18 PROCEDURE — 99214 OFFICE O/P EST MOD 30 MIN: CPT | Mod: S$GLB,,, | Performed by: UROLOGY

## 2017-09-18 PROCEDURE — 1159F MED LIST DOCD IN RCRD: CPT | Mod: S$GLB,,, | Performed by: UROLOGY

## 2017-09-18 PROCEDURE — 3074F SYST BP LT 130 MM HG: CPT | Mod: S$GLB,,, | Performed by: UROLOGY

## 2017-09-18 PROCEDURE — 1126F AMNT PAIN NOTED NONE PRSNT: CPT | Mod: S$GLB,,, | Performed by: UROLOGY

## 2017-09-18 PROCEDURE — 3008F BODY MASS INDEX DOCD: CPT | Mod: S$GLB,,, | Performed by: UROLOGY

## 2017-09-18 NOTE — PROGRESS NOTES
"Subjective:      Aroldo Johnston Jr. is a 76 y.o. male who returns today regarding his prostate cancer, UPJ obstruction, balanitis, and stones.    Prior  history as per below.    Only concern today is his balanitis, which he thinks is improved with topical meds.     No c/o today.    Has not done PSA.    Prostate Cancer  He states that he was diagnosed with prostate cancer in 2004 at [facility]; pT1c, [place] 3+3=6, PSA 16.5.  He states he was treated with radiation therapy with no known recurrence.  PSA sachin 0.97 in March 2009.  PSA slowly began to rise in 2011 and then more rapidly to 4.9 in Aug 2015, when he was referred to me.  Began ADT in 2016 and later opted for intermittent therapy. Last injection 3/3/16.     UPJ Obstruction  He was diagnosed w/ left UPJ obstruction in 2014 after presenting with large renal stone.  He had robotic pyeloplasty in August 2014.  He did not FU for stent removal, which was noted on CT in September 2015.  Stent exchanged in OR on 9/24/15 and subsequently removed in clinic.  FU US demonstrated stable, likely chronic hydronephrosis.    The following portions of the patient's history were reviewed and updated as appropriate: allergies, current medications, past family history, past medical history, past social history, past surgical history and problem list.    Review of Systems  A comprehensive multipoint review of systems was negative except as otherwise stated in the HPI.     Objective:   Vitals:   /75 (BP Location: Left arm, Patient Position: Sitting, BP Method: Medium (Automatic))   Pulse 82   Ht 5' 3" (1.6 m)   Wt 60.6 kg (133 lb 7.8 oz)   BMI 23.65 kg/m²     Physical Exam   General: alert and oriented, no acute distress  Respiratory: Symmetric expansion, non-labored breathing  Cardiovascular: regular rate and rhythm, no peripheral edema  Penis: Uncircumcised without phimosis; erythema of glans and underside of foreskin, unchanged from prior  Neuro: no gross deficits; " poor memory  Psych: normal judgment and insight, normal mood/affect and non-anxious    Lab Review   Component PSA DIAGNOSTIC   Latest Ref Rng & Units 0.00 - 4.00 ng/mL   3/20/2017 0.86   9/16/2016 0.03   7/1/2016 0.05   3/29/2016 0.31   1/6/2016 8.0 (H)       Assessment and Plan:   Balanitis  -- Improved  -- OK to reuse topical meds PRN  -- No indication for circumcision for now    Prostate cancer  Elevated PSA  -- PSA today  -- Repeat in 6 mos  -- Intermittent ADT as indicated    Hydronephrosis, left  -- Stable s/p left pyeloplasty

## 2017-09-26 RX ORDER — ATORVASTATIN CALCIUM 40 MG/1
TABLET, FILM COATED ORAL
Qty: 90 TABLET | Refills: 2 | Status: SHIPPED | OUTPATIENT
Start: 2017-09-26 | End: 2017-10-25

## 2017-10-04 ENCOUNTER — PATIENT OUTREACH (OUTPATIENT)
Dept: INTERNAL MEDICINE | Facility: CLINIC | Age: 77
End: 2017-10-04

## 2017-10-04 NOTE — PROGRESS NOTES
[facility] is committed to your overall health.  To help you get the most out of each of your visits, we will review your information to make sure you are up to date on all of your recommended tests and/or procedures.       It  has been  found that you may be due for:          Health Maintenance Due   Topic Date Due    Zoster Vaccine  11/03/2002    Abdominal Aortic Aneurysm Screening  11/03/2007    Pneumococcal (65+) (2 of 2 - PCV13) 08/14/2016    Influenza Vaccine  08/01/2017          If you have had any of the above done at another facility, please bring the records or information with you so that your record at [facility] will be complete.  If you would like to schedule any of these, please contact me.     If you are currently taking medication, please bring it with you to your appointment for review.     Also, if you have any type of Advanced Directives, please bring them with you to your office visit so we may scan them into your chart.     Patient contacted via [facility] patient portal in regards to healthcare matters that were past due. Encouraged to contact facility or speak with PCP .

## 2017-10-24 ENCOUNTER — PATIENT OUTREACH (OUTPATIENT)
Dept: INTERNAL MEDICINE | Facility: CLINIC | Age: 77
End: 2017-10-24

## 2017-10-24 NOTE — PROGRESS NOTES
[facility] is committed to your overall health.  To help you get the most out of each of your visits, we will review your information to make sure you are up to date on all of your recommended tests and/or procedures.       Your PCP  Cory Gardner MD   found that you may be due for:           Health Maintenance Due   Topic Date Due    Zoster Vaccine  11/03/2002    Abdominal Aortic Aneurysm Screening  11/03/2007    Pneumococcal (65+) (2 of 2 - PCV13) 08/14/2016    Influenza Vaccine  08/01/2017       If you have had any of the above done at another facility, please bring the records or information with you so that your record at [facility] will be complete.  If you would like to schedule any of these, please contact me.     If you are currently taking medication, please bring it with you to your appointment for review.     Also, if you have any type of Advanced Directives, please bring them with you to your office visit so we may scan them into your chart.       Thank you for choosing [facility] for your healthcare needs.

## 2017-10-25 ENCOUNTER — OFFICE VISIT (OUTPATIENT)
Dept: INTERNAL MEDICINE | Facility: CLINIC | Age: 77
End: 2017-10-25
Attending: INTERNAL MEDICINE
Payer: COMMERCIAL

## 2017-10-25 VITALS
BODY MASS INDEX: 24.1 KG/M2 | WEIGHT: 136 LBS | HEIGHT: 63 IN | HEART RATE: 80 BPM | SYSTOLIC BLOOD PRESSURE: 136 MMHG | DIASTOLIC BLOOD PRESSURE: 62 MMHG

## 2017-10-25 DIAGNOSIS — R29.898 LEG WEAKNESS, BILATERAL: Primary | ICD-10-CM

## 2017-10-25 PROCEDURE — 90471 IMMUNIZATION ADMIN: CPT | Mod: S$GLB,,, | Performed by: INTERNAL MEDICINE

## 2017-10-25 PROCEDURE — 90662 IIV NO PRSV INCREASED AG IM: CPT | Mod: S$GLB,,, | Performed by: INTERNAL MEDICINE

## 2017-10-25 PROCEDURE — 99999 PR PBB SHADOW E&M-EST. PATIENT-LVL III: CPT | Mod: PBBFAC,,, | Performed by: INTERNAL MEDICINE

## 2017-10-25 PROCEDURE — 99214 OFFICE O/P EST MOD 30 MIN: CPT | Mod: 25,S$GLB,, | Performed by: INTERNAL MEDICINE

## 2017-10-25 RX ORDER — LANOLIN ALCOHOL/MO/W.PET/CERES
1000 CREAM (GRAM) TOPICAL DAILY
Qty: 90 TABLET | Refills: 1 | Status: SHIPPED | OUTPATIENT
Start: 2017-10-25 | End: 2018-07-09

## 2017-10-25 NOTE — PROGRESS NOTES
"Subjective:       Patient ID: Aroldo Johnston Jr. is a 76 y.o. male.    Chief Complaint: Follow-up    Here for f/u     78 yo M c/ PMHx of ulcerative colitis, HIV, recurrent prostate CA s/p prostatectomy.  Continued discomfort of posterior thighs and calves. He exercises several days a week and has been doing the same routine for the past 15 yrs at the [facility]. He denies pain, soreness or stiffness. He denies weakness but does note fatigue when going up steps. He states he does not notice symptoms it if he does not pay attention to it, but that symptoms are present daily. He denies low back pain. Xray lumbar with severe DJD.  Hx of urinary incontinence s/p prostatectomy. Hx of UC and B12 deficiency, but has not been taking B12 supplementation. UC symptoms have been well controlled for months. He denies sensation of legs giving out, pain or radiating pain.         Review of Systems    Objective:      Vitals:    10/25/17 1352   BP: 136/62   Pulse: 80   Weight: 61.7 kg (136 lb 0.4 oz)   Height: 5' 3" (1.6 m)      Physical Exam   Constitutional: He is oriented to person, place, and time. He appears well-developed and well-nourished. He does not have a sickly appearance. No distress.   HENT:   Head: Normocephalic and atraumatic.   Eyes: Conjunctivae and EOM are normal. Right eye exhibits no discharge. Left eye exhibits no discharge. No scleral icterus.   Cardiovascular:   Pulses:       Dorsalis pedis pulses are 2+ on the right side, and 2+ on the left side.   Pulmonary/Chest: Effort normal. No respiratory distress.   Abdominal: Normal appearance. He exhibits no distension.   Neurological: He is alert and oriented to person, place, and time. He has normal strength. No sensory deficit. Coordination and gait normal. He displays no Babinski's sign on the right side. He displays no Babinski's sign on the left side.   Reflex Scores:       Brachioradialis reflexes are 1+ on the right side and 1+ on the left side.       Patellar " reflexes are 1+ on the right side and 1+ on the left side.  Skin: Skin is warm and dry. He is not diaphoretic.   Psychiatric: He has a normal mood and affect. His speech is normal.       Assessment:       1. Leg weakness, bilateral        Plan:       Aroldo was seen today for follow-up.    Diagnoses and all orders for this visit:    Leg weakness, bilateral  -given printout on calf and hamstring stretches. Stop statin. Repeat CPK. F/u with neuro to see if EMG warranted due to auto-immune Hx, vagueness of symptoms, and mildly elevated CPK. Hyperesthesia on left foot??  -     CK; Future  -     Aldolase; Future  -     Vitamin B12; Future  -     Comprehensive metabolic panel; Future  -     Ambulatory referral to Neurology  -     START cyanocobalamin (VITAMIN B-12) 1000 MCG tablet; Take 1 tablet (1,000 mcg total) by mouth once daily. May need injections.      -     Influenza - High Dose (65+) (PF) (IM)  -     Influenza - High Dose (65+) (PF) (IM)           RTC in 3 months or sooner prn       Side effects of medication(s) were discussed in detail and patient voiced understanding.  Patient will call back for any issues or complications.

## 2017-10-25 NOTE — PROGRESS NOTES
Patient given Fluzone IM highdose in the RD. Patient tolerated well and Band-Aid was applied. Lot#[id number] Exp:06/30/2018. Patient advised to wait in the lobby for 15 min to make sure no adverse reactions occur. Patient states verbal understanding and has no further questions.  Pt has been given vaccine information sheet.

## 2017-10-26 ENCOUNTER — LAB VISIT (OUTPATIENT)
Dept: LAB | Facility: OTHER | Age: 77
End: 2017-10-26
Attending: INTERNAL MEDICINE
Payer: COMMERCIAL

## 2017-10-26 DIAGNOSIS — E78.5 HYPERLIPIDEMIA, UNSPECIFIED HYPERLIPIDEMIA TYPE: ICD-10-CM

## 2017-10-26 DIAGNOSIS — R29.898 LEG WEAKNESS, BILATERAL: ICD-10-CM

## 2017-10-26 DIAGNOSIS — R74.8 ELEVATED CPK: ICD-10-CM

## 2017-10-26 LAB
ALBUMIN SERPL BCP-MCNC: 3.7 G/DL
ALP SERPL-CCNC: 77 U/L
ALT SERPL W/O P-5'-P-CCNC: 26 U/L
ANION GAP SERPL CALC-SCNC: 5 MMOL/L
AST SERPL-CCNC: 30 U/L
BILIRUB SERPL-MCNC: 1.7 MG/DL
BUN SERPL-MCNC: 12 MG/DL
CALCIUM SERPL-MCNC: 9.2 MG/DL
CHLORIDE SERPL-SCNC: 107 MMOL/L
CHOLEST SERPL-MCNC: 113 MG/DL
CHOLEST/HDLC SERPL: 4.7 {RATIO}
CK SERPL-CCNC: 707 U/L
CK SERPL-CCNC: 707 U/L
CO2 SERPL-SCNC: 26 MMOL/L
CREAT SERPL-MCNC: 1.3 MG/DL
EST. GFR  (AFRICAN AMERICAN): >60 ML/MIN/1.73 M^2
EST. GFR  (NON AFRICAN AMERICAN): 54 ML/MIN/1.73 M^2
GLUCOSE SERPL-MCNC: 122 MG/DL
HDLC SERPL-MCNC: 24 MG/DL
HDLC SERPL: 21.2 %
LDLC SERPL CALC-MCNC: 33.8 MG/DL
NONHDLC SERPL-MCNC: 89 MG/DL
POTASSIUM SERPL-SCNC: 3.6 MMOL/L
PROT SERPL-MCNC: 8 G/DL
SODIUM SERPL-SCNC: 138 MMOL/L
TRIGL SERPL-MCNC: 276 MG/DL
VIT B12 SERPL-MCNC: 266 PG/ML

## 2017-10-26 PROCEDURE — 82607 VITAMIN B-12: CPT

## 2017-10-26 PROCEDURE — 80061 LIPID PANEL: CPT

## 2017-10-26 PROCEDURE — 82550 ASSAY OF CK (CPK): CPT

## 2017-10-26 PROCEDURE — 36415 COLL VENOUS BLD VENIPUNCTURE: CPT

## 2017-10-26 PROCEDURE — 80053 COMPREHEN METABOLIC PANEL: CPT

## 2017-10-26 PROCEDURE — 82085 ASSAY OF ALDOLASE: CPT

## 2017-10-27 LAB — ALDOLASE SERPL-CCNC: 3.7 U/L

## 2017-12-11 ENCOUNTER — PATIENT OUTREACH (OUTPATIENT)
Dept: INTERNAL MEDICINE | Facility: CLINIC | Age: 77
End: 2017-12-11

## 2017-12-11 NOTE — PROGRESS NOTES
[facility] is committed to your overall health.  To help you get the most out of each of your visits, we will review your information to make sure you are up to date on all of your recommended tests and/or procedures.       Your PCP  Cory Gardner MD   found that you may be due for:       Health Maintenance Due   Topic Date Due    Zoster Vaccine  11/03/2002    Pneumococcal (65+) (2 of 2 - PCV13) 08/14/2016             If you have had any of the above done at another facility, please bring the records or information with you so that your record at [facility] will be complete.  If you would like to schedule any of these, please contact me.     If you are currently taking medication, please bring it with you to your appointment for review.     Also, if you have any type of Advanced Directives, please bring them with you to your office visit so we may scan them into your chart.     Thank you for Choosing [facility] for your healthcare needs.      Additional Information  If you have questions, you can email [email or web address] or call [phone]  to talk to our [facility] staff. Remember, [facility] is NOT to be used for urgent needs. For medical emergencies, dial 911.

## 2017-12-12 ENCOUNTER — OFFICE VISIT (OUTPATIENT)
Dept: INTERNAL MEDICINE | Facility: CLINIC | Age: 77
End: 2017-12-12
Attending: INTERNAL MEDICINE
Payer: COMMERCIAL

## 2017-12-12 VITALS
WEIGHT: 138.25 LBS | SYSTOLIC BLOOD PRESSURE: 112 MMHG | HEIGHT: 63 IN | DIASTOLIC BLOOD PRESSURE: 70 MMHG | HEART RATE: 73 BPM | BODY MASS INDEX: 24.5 KG/M2

## 2017-12-12 DIAGNOSIS — R74.8 ELEVATED CPK: Primary | ICD-10-CM

## 2017-12-12 PROCEDURE — 99213 OFFICE O/P EST LOW 20 MIN: CPT | Mod: S$GLB,,, | Performed by: INTERNAL MEDICINE

## 2017-12-12 PROCEDURE — 99999 PR PBB SHADOW E&M-EST. PATIENT-LVL III: CPT | Mod: PBBFAC,,, | Performed by: INTERNAL MEDICINE

## 2017-12-12 RX ORDER — ATORVASTATIN CALCIUM 40 MG/1
TABLET, FILM COATED ORAL
Refills: 2 | COMMUNITY
Start: 2017-09-26 | End: 2018-04-19

## 2017-12-12 NOTE — PATIENT INSTRUCTIONS
Please start taking b12 supplementation, over the counter, 1000mcg daily.  Please follow up with neurologist to discuss memory loss and thigh symptoms and muscle enzyme elevation  Continue to NOT TAKE lipitor or atorvastatin

## 2017-12-23 ENCOUNTER — HOSPITAL ENCOUNTER (EMERGENCY)
Facility: OTHER | Age: 77
Discharge: HOME | End: 2017-12-23
Attending: EMERGENCY MEDICINE
Payer: COMMERCIAL

## 2017-12-23 VITALS
BODY MASS INDEX: 23.39 KG/M2 | OXYGEN SATURATION: 97 % | HEIGHT: 63 IN | RESPIRATION RATE: 18 BRPM | DIASTOLIC BLOOD PRESSURE: 82 MMHG | WEIGHT: 132 LBS | TEMPERATURE: 98 F | HEART RATE: 68 BPM | SYSTOLIC BLOOD PRESSURE: 142 MMHG

## 2017-12-23 DIAGNOSIS — R42 VERTIGO: Primary | ICD-10-CM

## 2017-12-23 DIAGNOSIS — R11.2 NAUSEA & VOMITING: ICD-10-CM

## 2017-12-23 LAB
ALBUMIN SERPL BCP-MCNC: 3.8 G/DL
ALP SERPL-CCNC: 71 U/L
ALT SERPL W/O P-5'-P-CCNC: 31 U/L
ANION GAP SERPL CALC-SCNC: 9 MMOL/L
AST SERPL-CCNC: 33 U/L
BASOPHILS # BLD AUTO: 0.04 K/UL
BASOPHILS NFR BLD: 0.6 %
BILIRUB SERPL-MCNC: 1.4 MG/DL
BUN SERPL-MCNC: 13 MG/DL
CALCIUM SERPL-MCNC: 9.5 MG/DL
CHLORIDE SERPL-SCNC: 104 MMOL/L
CO2 SERPL-SCNC: 25 MMOL/L
CREAT SERPL-MCNC: 1.4 MG/DL
DIFFERENTIAL METHOD: ABNORMAL
EOSINOPHIL # BLD AUTO: 0.2 K/UL
EOSINOPHIL NFR BLD: 2.6 %
ERYTHROCYTE [DISTWIDTH] IN BLOOD BY AUTOMATED COUNT: 13 %
EST. GFR  (AFRICAN AMERICAN): 56 ML/MIN/1.73 M^2
EST. GFR  (NON AFRICAN AMERICAN): 49 ML/MIN/1.73 M^2
GLUCOSE SERPL-MCNC: 110 MG/DL
HCT VFR BLD AUTO: 42.1 %
HGB BLD-MCNC: 14.9 G/DL
LYMPHOCYTES # BLD AUTO: 2.1 K/UL
LYMPHOCYTES NFR BLD: 28.3 %
MAGNESIUM SERPL-MCNC: 2 MG/DL
MCH RBC QN AUTO: 42.1 PG
MCHC RBC AUTO-ENTMCNC: 35.4 G/DL
MCV RBC AUTO: 119 FL
MONOCYTES # BLD AUTO: 0.8 K/UL
MONOCYTES NFR BLD: 11.4 %
NEUTROPHILS # BLD AUTO: 4.1 K/UL
NEUTROPHILS NFR BLD: 56.4 %
PLATELET # BLD AUTO: 262 K/UL
PMV BLD AUTO: 9 FL
POTASSIUM SERPL-SCNC: 4.1 MMOL/L
PROT SERPL-MCNC: 8.1 G/DL
RBC # BLD AUTO: 3.54 M/UL
SODIUM SERPL-SCNC: 138 MMOL/L
WBC # BLD AUTO: 7.25 K/UL

## 2017-12-23 PROCEDURE — 99284 EMERGENCY DEPT VISIT MOD MDM: CPT | Mod: 25

## 2017-12-23 PROCEDURE — 85025 COMPLETE CBC W/AUTO DIFF WBC: CPT

## 2017-12-23 PROCEDURE — 25000003 PHARM REV CODE 250: Performed by: EMERGENCY MEDICINE

## 2017-12-23 PROCEDURE — 93010 ELECTROCARDIOGRAM REPORT: CPT | Mod: ,,, | Performed by: INTERNAL MEDICINE

## 2017-12-23 PROCEDURE — 83735 ASSAY OF MAGNESIUM: CPT

## 2017-12-23 PROCEDURE — 96372 THER/PROPH/DIAG INJ SC/IM: CPT

## 2017-12-23 PROCEDURE — 63600175 PHARM REV CODE 636 W HCPCS: Performed by: EMERGENCY MEDICINE

## 2017-12-23 PROCEDURE — 93005 ELECTROCARDIOGRAM TRACING: CPT

## 2017-12-23 PROCEDURE — 96360 HYDRATION IV INFUSION INIT: CPT

## 2017-12-23 PROCEDURE — 80053 COMPREHEN METABOLIC PANEL: CPT

## 2017-12-23 RX ORDER — PROCHLORPERAZINE EDISYLATE 5 MG/ML
5 INJECTION INTRAMUSCULAR; INTRAVENOUS
Status: COMPLETED | OUTPATIENT
Start: 2017-12-23 | End: 2017-12-23

## 2017-12-23 RX ORDER — MECLIZINE HYDROCHLORIDE 25 MG/1
50 TABLET ORAL
Status: COMPLETED | OUTPATIENT
Start: 2017-12-23 | End: 2017-12-23

## 2017-12-23 RX ORDER — MECLIZINE HYDROCHLORIDE 25 MG/1
25 TABLET ORAL 3 TIMES DAILY PRN
Qty: 20 TABLET | Refills: 0 | Status: SHIPPED | OUTPATIENT
Start: 2017-12-23 | End: 2018-09-26

## 2017-12-23 RX ORDER — DOCUSATE SODIUM 100 MG/1
100 CAPSULE, LIQUID FILLED ORAL DAILY
Status: DISCONTINUED | OUTPATIENT
Start: 2017-12-23 | End: 2017-12-23 | Stop reason: HOSPADM

## 2017-12-23 RX ORDER — ONDANSETRON 4 MG/1
4 TABLET, FILM COATED ORAL EVERY 6 HOURS
Qty: 12 TABLET | Refills: 0 | Status: SHIPPED | OUTPATIENT
Start: 2017-12-23 | End: 2018-07-09

## 2017-12-23 RX ORDER — ONDANSETRON 8 MG/1
8 TABLET, ORALLY DISINTEGRATING ORAL
Status: COMPLETED | OUTPATIENT
Start: 2017-12-23 | End: 2017-12-23

## 2017-12-23 RX ADMIN — PROCHLORPERAZINE EDISYLATE 5 MG: 5 INJECTION INTRAMUSCULAR; INTRAVENOUS at 02:12

## 2017-12-23 RX ADMIN — ONDANSETRON 8 MG: 8 TABLET, ORALLY DISINTEGRATING ORAL at 12:12

## 2017-12-23 RX ADMIN — MECLIZINE HYDROCHLORIDE 50 MG: 25 TABLET ORAL at 12:12

## 2017-12-23 RX ADMIN — DOCUSATE SODIUM 100 MG: 100 CAPSULE, LIQUID FILLED ORAL at 12:12

## 2017-12-23 RX ADMIN — SODIUM CHLORIDE 500 ML: 900 INJECTION, SOLUTION INTRAVENOUS at 02:12

## 2017-12-23 NOTE — ED NOTES
Patient Identifiers for Aroldo Johnston Jr. checked and correct  LOC: The patient is awake, alert and aware of environment with an appropriate affect, the patient is oriented x 3 and speaking appropriate.  APPEARANCE: Patient resting comfortably and in no acute distress, patient is clean and well groomed, patient's clothing is properly fastened.  SKIN: The skin is warm and dry, patient has normal skin turgor and moist mucus membranes,no rashes or lesions.Skin Intact , No Breakdown Noted  Musculoskeletal :  Normal range of motion noted. Moves all extremeties well, No swelling or tenderness noted  RESPIRATORY: Airway is open and patent, respirations are spontaneous, patient has a normal effort and rate.Bilateral Lungs Sounds are clear  CARDIAC: Patient has a normal rate and rhythm, no periphreal edema noted, capillary refill < 3 seconds.   ABDOMEN: Soft and non tender to palpation, no distention noted. Bowels Sounds are +  PULSES: 2+  And symmetrical in all extremeties  NEUROLOGIC: PERRL,  facial expression is symmetrical, patient moving all extremities, normal sensation in all extremities when touched with a finger.The patient is awake, alert and cooperative with a calm affect, patient is aware of environment.    Will continue to monitor

## 2017-12-23 NOTE — ED NOTES
Pt and sister at bedside, pt insistent on leaving, refusing to stay , Dr. Marin notified, pt requesting PIV be removed, done per pt request.

## 2017-12-23 NOTE — ED PROVIDER NOTES
"Encounter Date: 12/23/2017    SCRIBE #1 NOTE: I, Genoveva Barr, am scribing for, and in the presence of, Dr. Marin.       History     Chief Complaint   Patient presents with    Dizziness     c/o dizziness with onset 1 hr prior to arrival, N/V x 2, no prior history of dizziness, denies CP, SOB     Time seen by provider: 12:09 PM    This is a 77 y.o. male who presents with complaint of dizziness that has persisted for approximately 2 hours.  The patient denies "room-spinning" sensations, but he claims that he feels "off-balance."  He claims that he was doing nothing memorable at the onset of his discomfort.  He endorses associated nausea and vomiting.  The patient mentions that he has had "reduced hearing for 2 months" in the right ear, which he attributed to wax build up.  He denies fever, chills, congestion, rhinorrhea, ear pain, ear discharge, cough, and lightheadedness.  He notes that his symptoms appear to be worse with movement of the head.  The patient reports no other identifying, alleviating, or exacerbating factors.  He claims that he has had no prior episodes of this symptomology.  As per medical records, he has history of HTN, HIV, colon cancer, prostate cancer, IBS, and CAD.  He admits that he does not have an ENT.        The history is provided by the patient and medical records.     Review of patient's allergies indicates:  No Known Allergies  Past Medical History:   Diagnosis Date    Anemia     Anxiety     Coronary artery disease     Depression     HIV (human immunodeficiency virus infection)     Hypertension     Inflammatory bowel disease     colitis    Malignant neoplasm of colon, unspecified site     Colon cancer    Malignant neoplasm of colon, unspecified site     Colon cancer    Prostate cancer     STD (sexually transmitted disease)      Past Surgical History:   Procedure Laterality Date    cardiac stents      COLON SURGERY      NECK SURGERY      radiation       Family History "   Problem Relation Age of Onset    Colon cancer Paternal Grandmother     Stomach cancer Neg Hx     Rectal cancer Neg Hx     Irritable bowel syndrome Neg Hx     Esophageal cancer Neg Hx     Crohn's disease Neg Hx     Celiac disease Neg Hx      Social History   Substance Use Topics    Smoking status: Former Smoker     Packs/day: 1.00     Years: 10.00     Types: Cigarettes    Smokeless tobacco: Never Used    Alcohol use Yes      Comment: occ drinks     Review of Systems   Constitutional: Negative for chills and fever.   HENT: Positive for hearing loss (right ear). Negative for congestion, ear discharge, ear pain, facial swelling and rhinorrhea.         Positive for earwax in the right ear.     Respiratory: Negative for cough, chest tightness and shortness of breath.    Cardiovascular: Negative for chest pain.   Gastrointestinal: Positive for nausea and vomiting. Negative for abdominal pain.   Endocrine: Negative for polyuria.   Genitourinary: Negative for dysuria.   Musculoskeletal: Negative for myalgias.   Skin: Negative for rash.   Neurological: Positive for dizziness. Negative for light-headedness and headaches.       Physical Exam     Initial Vitals [12/23/17 1144]   BP Pulse Resp Temp SpO2   (!) 143/81 73 16 97.4 °F (36.3 °C) 95 %      MAP       101.67         Physical Exam    Nursing note and vitals reviewed.  Constitutional: He appears well-developed and well-nourished. No distress.   HENT:   Head: Normocephalic and atraumatic.   Mouth/Throat: Oropharynx is clear and moist.   Waxy substance present to the right ear canal.  Symptoms worsened when turning head.     Eyes: Conjunctivae and EOM are normal. Pupils are equal, round, and reactive to light. Right eye exhibits no nystagmus. Left eye exhibits no nystagmus.   No nystagmus bilaterally.     Neck: Normal range of motion. Neck supple. No thyromegaly present. No JVD present.   Cardiovascular: Normal rate, regular rhythm and normal heart sounds.    Pulmonary/Chest: Breath sounds normal. No respiratory distress.   Abdominal: Soft. Bowel sounds are normal. He exhibits no distension and no mass. There is no tenderness.   Musculoskeletal: Normal range of motion.   Neurological: He is alert and oriented to person, place, and time. He has normal strength. No cranial nerve deficit or sensory deficit.   Patient has normal speech.  CN II-XII intact.  No facial asymmetry.  No pronator drift.  Normal strength upper and lower extremities.  Normal heel-to-shin.  Normal finger-to-nose.     Skin: Skin is warm and dry. No rash noted.   Psychiatric: He has a normal mood and affect. His behavior is normal. Judgment and thought content normal.         ED Course   Procedures  Labs Reviewed   CBC W/ AUTO DIFFERENTIAL - Abnormal; Notable for the following:        Result Value    RBC 3.54 (*)      (*)     MCH 42.1 (*)     MPV 9.0 (*)     All other components within normal limits   COMPREHENSIVE METABOLIC PANEL - Abnormal; Notable for the following:     Total Bilirubin 1.4 (*)     eGFR if  56 (*)     eGFR if non  49 (*)     All other components within normal limits   MAGNESIUM   POCT GLUCOSE MONITORING CONTINUOUS      Imaging Results          CT Head Without Contrast (Final result)  Result time 12/23/17 13:53:51    Final result by Aleksandra Munoz MD (12/23/17 13:53:51)                 Impression:        1.  No acute large vascular territory infarct or intracranial hemorrhage identified.    2.  Degree of ventriculomegaly is slightly out of proportion to the sulcal prominence, nonspecific but can be seen with normal pressure hydrocephalus. Correlate clinically.    3. Senescent changes as above.      Electronically signed by: ALEKSANDRA MUNOZ MD, MD  Date:     12/23/17  Time:    13:53              Narrative:    COMPARISON: None    FINDINGS: No evidence of hemorrhage, mass, midline shift or acute major vascular territory infarct.  Gray white interface  "appears intact. There is mild prominence of the cortical sulci consistent with mild atrophy. The degree of ventriculomegaly is slightly out of proportion to the sulcal prominence. The ventricles are otherwise midline without distortion by mass effect. There are patchy and confluent low attenuation changes throughout the subcortical and periventricular white matter, nonspecific but can be seen with chronic small vessel ischemic disease.   No extra-axial hemorrhage or mass. Skull base  vascular calcifications are noted.     The extracranial structures are within normal limits.  Calvarium is intact.  Visualized paranasal sinuses are clear.  Mastoid air cells are clear. Visualized portions of the orbits are within normal limits.                              EKG Readings: (Independently Interpreted)   Initial Reading: No STEMI.   Normal sinus rhythm.  Rate of 62.  Normal Axis.  Normal Intervals.  No STEMI.           Medical Decision Making:   History:   Old Medical Records: I decided to obtain old medical records.  Initial Assessment:   Urgent evaluation a 77-year-old gentleman with complex medical history including HIV, recurrent prostate cancer, ulcerative colitis here with complaint of "vertigo".  Patient endorses diminished hearing to right ear, had acute onset of room spinning, nausea this morning, with 2 episodes of emesis.  Patient denies recent illness, though diminished hearing to right ear over recent past, denies tinnitus, no difficulty walking or speaking.  On exam patient has a nonfocal neuro exam, no pronator drift, normal speech and findings of R ear cerumen v coleastoma. - Tushar Hallpike. Suspect peripheral vertigo without suspicion for central cause at this time.     Upon plan for discharge, pt experienced several episodes of emesis. Labs and CT then performed and additional meds given.     Pt improved, and able to ambulate without issue and improved nausea.  And given strict return precuations, fu ENT, " and dc home in custody of sister- who is a nurse and willing to closely observe.   Independently Interpreted Test(s):   I have ordered and independently interpreted EKG Reading(s) - see prior notes  Clinical Tests:   Lab Tests: Ordered and Reviewed  Radiological Study: Ordered and Reviewed  Medical Tests: Ordered and Reviewed            Scribe Attestation:   Scribe #1: I performed the above scribed service and the documentation accurately describes the services I performed. I attest to the accuracy of the note.    Attending Attestation:           Physician Attestation for Scribe:  Physician Attestation Statement for Scribe #1: I, Dr. Marin, reviewed documentation, as scribed by Genoveva Barr in my presence, and it is both accurate and complete.                 ED Course      Clinical Impression:     1. Vertigo    2. Nausea & vomiting        Disposition:   Disposition: Discharged  Condition: Jaqui Marin MD  12/23/17 5336

## 2017-12-23 NOTE — ED NOTES
Pt able to ambulate in ED with steady gait, still with mild complaints of vertigo, pt requesting to leave, VSS, Dr. Marin notified of findings

## 2017-12-23 NOTE — ED NOTES
Pt returned from CT and was vomiting. Pt is currently sitting in the wheelchair per request. Pt has sister at the bedside. Pt is visible form the nurse station.

## 2017-12-28 NOTE — PROGRESS NOTES
"Subjective:       Patient ID: Aroldo Johnston Jr. is a 77 y.o. male.    Chief Complaint: Follow-up    Here for f/u    HPI 10/25/17  76 yo M c/ PMHx of ulcerative colitis, HIV, recurrent prostate CA s/p prostatectomy.  Continued discomfort of posterior thighs and calves. He exercises several days a week and has been doing the same routine for the past 15 yrs at the [facility]. He denies pain, soreness or stiffness. He denies weakness but does note fatigue when going up steps. He states he does not notice symptoms it if he does not pay attention to it, but that symptoms are present daily. He denies low back pain. Xray lumbar with severe DJD.  Hx of urinary incontinence s/p prostatectomy. Hx of UC and B12 deficiency, but has not been taking B12 supplementation. UC symptoms have been well controlled for months. He denies sensation of legs giving out, pain or radiating pain.     Today he states he has held his statin. He has been taking his b12 1000 daily. No change in symptoms. Actually he had to ask why we were doing all these things. Reminded him of symptoms, he corroborated them but continues to confirm their minimal affects on him. He confirmed he had exercised within 36 hrs of last CPK draw. No new symptoms.          Review of Systems    Objective:      Vitals:    12/12/17 1532   BP: 112/70   Pulse: 73   Weight: 62.7 kg (138 lb 3.7 oz)   Height: 5' 3" (1.6 m)      Physical Exam    Assessment:       1. Elevated CPK        Plan:       Aroldo was seen today for follow-up.    Diagnoses and all orders for this visit:    Elevated CPK  -continue to hold statin. Do not exercise for 2 weeks then repeat CPK. I have again asked him to schedule appt with neurology for EMG to see how best to proceed due to pt auto immune Hx, HIV +, Hx of B12 def, and therefore large differential.  CPK elevation mild and can be due to exercise, statin. With mild possible myopathy symptoms, CPK and dementia- Damon Body is on list ? Appreciate neurology " input. May need rheum eval.  Patient given detailed printout of rec and plan moving forward. Staff to schedule appt before pt leaves clinic today (pt permitting)  -     CK; Future               Side effects of medication(s) were discussed in detail and patient voiced understanding.  Patient will call back for any issues or complications.

## 2018-03-13 ENCOUNTER — TELEPHONE (OUTPATIENT)
Dept: UROLOGY | Facility: CLINIC | Age: 78
End: 2018-03-13

## 2018-03-13 NOTE — TELEPHONE ENCOUNTER
----- Message from Marta Hunter sent at 3/13/2018 10:50 AM CDT -----  Contact: Pt can be reached at [phone]  Pt is calling to request an appt soon as possible for infection on skin of penis. Pt is calling to request a info for circumcision.      Thank you!

## 2018-03-20 ENCOUNTER — TELEPHONE (OUTPATIENT)
Dept: UROLOGY | Facility: CLINIC | Age: 78
End: 2018-03-20

## 2018-03-20 DIAGNOSIS — R97.20 ELEVATED PSA: Primary | ICD-10-CM

## 2018-03-20 NOTE — TELEPHONE ENCOUNTER
Order is now placed. Attempt to contact pt no answer. A voice message was left informing pt that the call was being made to get him schedule (PSA). A call back number was left.

## 2018-03-20 NOTE — TELEPHONE ENCOUNTER
----- Message from Blanche Arita sent at 3/20/2018 11:55 AM CDT -----  Contact: SUSAN LIMON JR. [[id number]]  x_  1st Request  _  2nd Request  _  3rd Request        Who: SUSAN LIMON JR. [[id number]]    Why: patient states he would like a call back in regards to what labs he needs done before his appt on 3/27/18    What Number to Call Back: [phone]    When to Expect a call back: (Before the end of the day)   -- if call after 3:00 call back will be tomorrow.

## 2018-03-20 NOTE — TELEPHONE ENCOUNTER
Spoke with pt. He is requesting to have a PSA scheduled.can you please place an order so I can get pt scheduled.

## 2018-03-22 ENCOUNTER — TELEPHONE (OUTPATIENT)
Dept: UROLOGY | Facility: CLINIC | Age: 78
End: 2018-03-22

## 2018-03-22 ENCOUNTER — LAB VISIT (OUTPATIENT)
Dept: LAB | Facility: OTHER | Age: 78
End: 2018-03-22
Attending: UROLOGY
Payer: COMMERCIAL

## 2018-03-22 DIAGNOSIS — R97.20 ELEVATED PSA: ICD-10-CM

## 2018-03-22 PROCEDURE — 84154 ASSAY OF PSA FREE: CPT

## 2018-03-22 PROCEDURE — 36415 COLL VENOUS BLD VENIPUNCTURE: CPT

## 2018-03-22 NOTE — TELEPHONE ENCOUNTER
----- Message from Nahomi Johnson sent at 3/22/2018  1:48 PM CDT -----  Contact: self  x_  1st Request  _  2nd Request  _  3rd Request    Who: pt    Why: pt returning call.. Please advise    What Number to Call Back: [phone]    When to Expect a call back: (Before the end of the day)   -- if call after 3:00 call back will be tomorrow.

## 2018-03-22 NOTE — TELEPHONE ENCOUNTER
----- Message from Noe Ndiaye sent at 3/22/2018  1:34 PM CDT -----  Contact: Pt  _x 1st Request  _  2nd Request  _  3rd Request        Who: pt    Why: Returning a call back from your office. Please return the call at earliest convenience.   Thanks!    What Number to Call Back:[phone] (home)       When to Expect a call back: (With in 24 hours)

## 2018-03-23 LAB
PROSTATE SPECIFIC ANTIGEN, TOTAL: 6.4 NG/ML
PSA FREE MFR SERPL: 11.09 %
PSA FREE SERPL-MCNC: 0.71 NG/ML

## 2018-04-04 RX ORDER — MIRTAZAPINE 15 MG/1
15 TABLET, FILM COATED ORAL NIGHTLY
Qty: 30 TABLET | Refills: 3 | Status: SHIPPED | OUTPATIENT
Start: 2018-04-04 | End: 2018-04-19 | Stop reason: SDUPTHER

## 2018-04-04 NOTE — TELEPHONE ENCOUNTER
----- Message from Natty Monk sent at 4/4/2018 12:54 PM CDT -----  Contact: SUSAN LIMON JR. [[id number]]      Can the clinic reply in [facility] No      Please refill the medication(s) listed below. Please call the patient when the prescription(s) is ready for  at this phone number   [phone]      Medication #1 mirtazapine (REMERON) 15 MG tablet 90 tablet     Medication #2       Preferred Pharmacy:     [facility]   [address] [place] [address]   [phone]

## 2018-04-16 ENCOUNTER — TELEPHONE (OUTPATIENT)
Dept: ENDOSCOPY | Facility: HOSPITAL | Age: 78
End: 2018-04-16

## 2018-04-16 ENCOUNTER — OFFICE VISIT (OUTPATIENT)
Dept: GASTROENTEROLOGY | Facility: CLINIC | Age: 78
End: 2018-04-16
Payer: COMMERCIAL

## 2018-04-16 VITALS
HEIGHT: 62 IN | SYSTOLIC BLOOD PRESSURE: 108 MMHG | BODY MASS INDEX: 24.38 KG/M2 | WEIGHT: 132.5 LBS | DIASTOLIC BLOOD PRESSURE: 66 MMHG | HEART RATE: 69 BPM

## 2018-04-16 DIAGNOSIS — K51.50 LEFT SIDED ULCERATIVE COLITIS WITHOUT COMPLICATION: Primary | ICD-10-CM

## 2018-04-16 DIAGNOSIS — Z12.11 SPECIAL SCREENING FOR MALIGNANT NEOPLASMS, COLON: Primary | ICD-10-CM

## 2018-04-16 PROCEDURE — 3074F SYST BP LT 130 MM HG: CPT | Mod: CPTII,S$GLB,, | Performed by: INTERNAL MEDICINE

## 2018-04-16 PROCEDURE — 99999 PR PBB SHADOW E&M-EST. PATIENT-LVL III: CPT | Mod: PBBFAC,,, | Performed by: INTERNAL MEDICINE

## 2018-04-16 PROCEDURE — 99213 OFFICE O/P EST LOW 20 MIN: CPT | Mod: S$GLB,,, | Performed by: INTERNAL MEDICINE

## 2018-04-16 PROCEDURE — 3078F DIAST BP <80 MM HG: CPT | Mod: CPTII,S$GLB,, | Performed by: INTERNAL MEDICINE

## 2018-04-16 RX ORDER — POLYETHYLENE GLYCOL 3350, SODIUM SULFATE ANHYDROUS, SODIUM BICARBONATE, SODIUM CHLORIDE, POTASSIUM CHLORIDE 236; 22.74; 6.74; 5.86; 2.97 G/4L; G/4L; G/4L; G/4L; G/4L
4 POWDER, FOR SOLUTION ORAL ONCE
Qty: 4000 ML | Refills: 0 | Status: SHIPPED | OUTPATIENT
Start: 2018-04-16 | End: 2018-04-16

## 2018-04-16 NOTE — PROGRESS NOTES
[facility] Established Patient Visit    Reason for Visit:    Chief Complaint   Patient presents with    Diarrhea       PCP: Cory Gardner    HPI:  Aroldo Johnston Jr. is a 77 y.o. male here for follow-up of ulcerative colitis.  I saw him last in March of last year and he was seen by my NP, Sparkle Aragon, in July.  We recommended he take Asacol HD and worked with out specialty pharmacy to make sure that he was approved.  He tells me that he doesn't recall picking up the medication and doesn't recall if he is taking it.  He is unsure of most of his medications and did not bring them with him.  Hist last colonoscopy was in June 2015 as noted in the record.  His symptoms are currently not bad.  He has 4 BM per day, which is average for him.  He denies urgency or tenesmus.  He also denies blood in the stool or abdominal pain.  He is eating well and denies weight loss.          ROS:  Constitutional: No fevers, chills, No weight loss, normal appetite  Pulm: No cough, No shortness of breath  Ophtho: No vision changes or pain  GI: see HPI  Derm: No rash or lesions  MSK: No arthritis or joint swelling          PMHX:  has a past medical history of Anemia; Anxiety; Coronary artery disease; Depression; HIV (human immunodeficiency virus infection); Hypertension; Inflammatory bowel disease; Malignant neoplasm of colon, unspecified site; Malignant neoplasm of colon, unspecified site; Prostate cancer; and STD (sexually transmitted disease).    PSHX:  has a past surgical history that includes Neck surgery; cardiac stents; Colon surgery; and radiation.    The patient's social and family histories were reviewed by me and updated in the appropriate section of the electronic medical record.    Review of patient's allergies indicates:  No Known Allergies    Prior to Admission medications    Medication Sig Start Date End Date Taking? Authorizing Provider   atorvastatin (LIPITOR) 40 MG tablet  9/26/17  Yes  "Historical Provider, MD   indinavir (CRIXIVAN) 400 MG capsule Take 2 capsules (800 mg total) by mouth 3 (three) times daily. 8/22/16  Yes Cory Gardner MD   lamivudine-zidovudine 150-300mg (COMBIVIR) 150-300 mg Tab Take 1 tablet by mouth 2 (two) times daily. 8/22/16  Yes Cory Gardner MD   trazodone (DESYREL) 50 MG tablet Take 1-2 tablets ( mg total) by mouth every evening. 8/10/17 8/10/18 Yes Cory Gardner MD   cyanocobalamin (VITAMIN B-12) 1000 MCG tablet Take 1 tablet (1,000 mcg total) by mouth once daily. 10/25/17   Cory Gardner MD   meclizine (ANTIVERT) 25 mg tablet Take 1 tablet (25 mg total) by mouth 3 (three) times daily as needed. 12/23/17   Kimmy Marin MD   mirtazapine (REMERON) 15 MG tablet Take 1 tablet (15 mg total) by mouth every evening. 4/4/18   Willie Granger MD   ondansetron (ZOFRAN) 4 MG tablet Take 1 tablet (4 mg total) by mouth every 6 (six) hours. 12/23/17   Kimmy Marin MD         Objective Findings:  Vital Signs:  /66   Pulse 69   Ht 5' 2" (1.575 m)   Wt 60.1 kg (132 lb 7.9 oz)   BMI 24.23 kg/m²  Body mass index is 24.23 kg/m².    Physical Exam:  General Appearance:  Well appearing in no acute distress, appears stated age  Head:  Normocephalic, atraumatic  Eyes:  No scleral icterus or pallor, EOMI        Labs:  Lab Results   Component Value Date    WBC 7.25 12/23/2017    HGB 14.9 12/23/2017    HCT 42.1 12/23/2017     (H) 12/23/2017    RDW 13.0 12/23/2017     12/23/2017    GRAN 4.1 12/23/2017    GRAN 56.4 12/23/2017    LYMPH 2.1 12/23/2017    LYMPH 28.3 12/23/2017    MONO 0.8 12/23/2017    MONO 11.4 12/23/2017    EOS 0.2 12/23/2017    BASO 0.04 12/23/2017     Lab Results   Component Value Date     12/23/2017    K 4.1 12/23/2017     12/23/2017    CO2 25 12/23/2017     12/23/2017    BUN 13 12/23/2017    CREATININE 1.4 12/23/2017    CALCIUM 9.5 12/23/2017    PROT 8.1 12/23/2017    ALBUMIN 3.8 12/23/2017    " BILITOT 1.4 (H) 12/23/2017    ALKPHOS 71 12/23/2017    AST 33 12/23/2017    ALT 31 12/23/2017                   Assessment:  Aroldo Johnston Jr. is a 77 y.o. 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 He has had this disease for many years and has been followed by multiple physicians in the GI and colorectal surgery departments. He has a long history of treatment non-compliance. He has been treated with Lialda, Colazal, Azulfidine, Rowasa, and Canasa. He did not respond well to the Lialda, Colazal, or Asulfidine. Does not appear to have used steroids. No colon surgery. Biopsies from colon have never found any dysplasia. His last colonoscopy was June 2015 showing proctitis and rectal stricture. He has a history of prostate cancer and had radiation therapy.       He is currently is asymptomatic and not on treatment, which is in no part a failure of our effort to start him on therapy. At the last 4 clinic visits, my NP and I have tried to start him on treatment with mesalamine (Asacol), but for various reasons, he did not get this medications.  We even coordinated with the pharmacy to make sure it was approved.  He is unsure of his medications and does not have them with him today.  I am afraid that this is going to continue to be a problem for him and I have stressed to him the potential medical consequences of not treating his UC appropriately which includes damage to his colon and also increased risk for colon cancer.        Recommendations:  I recommended he have a colonoscopy to restage his disease as he is relatively asymptomatic right now.  At first, he was resistant to this, but then was agreeable after we discussed options other than colonoscopy.      Follow-up pending colonoscopy.          Stoney Bal MD

## 2018-04-19 ENCOUNTER — OFFICE VISIT (OUTPATIENT)
Dept: INTERNAL MEDICINE | Facility: CLINIC | Age: 78
End: 2018-04-19
Attending: INTERNAL MEDICINE
Payer: COMMERCIAL

## 2018-04-19 VITALS
BODY MASS INDEX: 24.38 KG/M2 | DIASTOLIC BLOOD PRESSURE: 74 MMHG | WEIGHT: 132.5 LBS | HEART RATE: 72 BPM | SYSTOLIC BLOOD PRESSURE: 122 MMHG | HEIGHT: 62 IN

## 2018-04-19 DIAGNOSIS — E78.5 HYPERLIPIDEMIA, UNSPECIFIED HYPERLIPIDEMIA TYPE: ICD-10-CM

## 2018-04-19 DIAGNOSIS — E53.8 B12 DEFICIENCY: ICD-10-CM

## 2018-04-19 DIAGNOSIS — R74.8 ELEVATED CPK: ICD-10-CM

## 2018-04-19 DIAGNOSIS — M62.81 MUSCLE WEAKNESS OF LOWER EXTREMITY: Primary | ICD-10-CM

## 2018-04-19 DIAGNOSIS — F03.90 DEMENTIA WITHOUT BEHAVIORAL DISTURBANCE, UNSPECIFIED DEMENTIA TYPE: ICD-10-CM

## 2018-04-19 PROCEDURE — 99214 OFFICE O/P EST MOD 30 MIN: CPT | Mod: S$GLB,,, | Performed by: INTERNAL MEDICINE

## 2018-04-19 PROCEDURE — 3074F SYST BP LT 130 MM HG: CPT | Mod: CPTII,S$GLB,, | Performed by: INTERNAL MEDICINE

## 2018-04-19 PROCEDURE — 99999 PR PBB SHADOW E&M-EST. PATIENT-LVL IV: CPT | Mod: PBBFAC,,, | Performed by: INTERNAL MEDICINE

## 2018-04-19 PROCEDURE — 3078F DIAST BP <80 MM HG: CPT | Mod: CPTII,S$GLB,, | Performed by: INTERNAL MEDICINE

## 2018-04-19 RX ORDER — MIRTAZAPINE 15 MG/1
15 TABLET, FILM COATED ORAL NIGHTLY
Qty: 90 TABLET | Refills: 2 | Status: SHIPPED | OUTPATIENT
Start: 2018-04-19 | End: 2018-07-09

## 2018-04-19 NOTE — PATIENT INSTRUCTIONS
Need labwork done after you have not worked out for a week.  Please do not take atorvastatin until.  Please follow up with Dr. Ponce the neurologist who specializes in memory problems.

## 2018-04-19 NOTE — PROGRESS NOTES
"Subjective:       Patient ID: Aroldo Johnston Jr. is a 77 y.o. male.    Chief Complaint: Follow-up    Here for urgent visit    78 yo M c/ PMHx of ulcerative colitis, HIV, recurrent prostate CA s/p prostatectomy, memory loss, b12 deficiency presents for f/u    At last visit plan was to continue to hold statin, repeat CPK without having exercised prior, and f/u with neuro for EMG due to multiple medical comorbidites and symptoms creating a large differential.    He does not complain of anything today. He states he is due for a refill of his sleep aid, mirtazapine. He denies daytime somnolence or generalized fatigue.    He was a no show for neuro twice. He does not recall this even with prompting. He does agree and is aware that his degree of memory difficulties are past general forgetfullness associated with aging.    He is taking his atorvastatin. He does not complain of his leg symptoms. When reminded he admits he has some weakness of LE that is intermittent but daily. It does not prohibit him from exercise which he does 3-4 times a week at the [facility]. He did not stop and then get repeat CPK as requested and written and highlighted on previous AVS.     UC is controlled. No acute issues He saw Dr Bal 3 days prior at [facility]. He remembers Dr Bal. Reports adherence with B12 supplementation.         Review of Systems    Objective:      Vitals:    04/19/18 1335   BP: 122/74   Pulse: 72   Weight: 60.1 kg (132 lb 7.9 oz)   Height: 5' 2" (1.575 m)      Physical Exam   Constitutional: He is oriented to person, place, and time. He appears well-developed and well-nourished. He does not have a sickly appearance. No distress.   HENT:   Head: Normocephalic and atraumatic.   Eyes: Conjunctivae and EOM are normal. Right eye exhibits no discharge. Left eye exhibits no discharge. No scleral icterus.   Pulmonary/Chest: Effort normal. No respiratory distress.   Abdominal: Normal appearance. He exhibits no distension.   Neurological: He " is alert and oriented to person, place, and time.   Skin: Skin is warm and dry. He is not diaphoretic.   Psychiatric: He has a normal mood and affect. His speech is normal.       Assessment:       1. Muscle weakness of lower extremity    2. Elevated CPK    3. Dementia without behavioral disturbance, unspecified dementia type    4. B12 deficiency    5. Hyperlipidemia, unspecified hyperlipidemia type        Plan:       Aroldo was seen today for follow-up.    Diagnoses and all orders for this visit:    Muscle weakness of lower extremity  -     CK; Future    Elevated CPK  -     CK; Future    Dementia without behavioral disturbance, unspecified dementia type   -f/u with neuro for dementia. Have spoken with patient's sister who is also a patient of mine and she is currently working on resources and arrangements for additional coverage/care for him in near future. She is aware his memory is worsening. Patient is overall independent, no MSK issues aside from mild thigh symptoms.         RTC in 3 months or sooner prn         Side effects of medication(s) were discussed in detail and patient voiced understanding.  Patient will call back for any issues or complications.

## 2018-05-01 RX ORDER — MIRTAZAPINE 15 MG/1
TABLET, FILM COATED ORAL
Qty: 90 TABLET | Refills: 2 | Status: SHIPPED | OUTPATIENT
Start: 2018-05-01 | End: 2018-07-09

## 2018-05-03 ENCOUNTER — TELEPHONE (OUTPATIENT)
Dept: GASTROENTEROLOGY | Facility: CLINIC | Age: 78
End: 2018-05-03

## 2018-05-03 NOTE — TELEPHONE ENCOUNTER
Patient is calling to inform Dr. Bal he cancelled his colonoscopy and is requesting an appointment with Dr. Bal.    Message routed to provider.

## 2018-05-03 NOTE — TELEPHONE ENCOUNTER
----- Message from Patricia Del Toro sent at 5/2/2018  8:38 AM CDT -----  Contact: Self- [phone]  Valeriano- pt called to notify Dr. Bal that he canceled his c-scope for Friday- also needs to schedule another f/u appt with Dr. Bal as well- please contact pt at [phone]

## 2018-05-07 ENCOUNTER — TELEPHONE (OUTPATIENT)
Dept: NEUROLOGY | Facility: CLINIC | Age: 78
End: 2018-05-07

## 2018-05-08 ENCOUNTER — TELEPHONE (OUTPATIENT)
Dept: PLASTIC SURGERY | Facility: CLINIC | Age: 78
End: 2018-05-08

## 2018-05-08 ENCOUNTER — TELEPHONE (OUTPATIENT)
Dept: NEUROLOGY | Facility: CLINIC | Age: 78
End: 2018-05-08

## 2018-05-08 NOTE — TELEPHONE ENCOUNTER
----- Message from Garcia Lance sent at 5/8/2018 12:35 PM CDT -----  Pt wants to know if he can have a consult for a chin lift. Pt wants the skin under the chin removed. Please call pt at [phone]

## 2018-05-08 NOTE — TELEPHONE ENCOUNTER
Called the patient and was ready to schedule a consult , once I informed him of the 75$ fee for consult , He informed me that he will call back at a later time to schedule a consult .

## 2018-05-08 NOTE — TELEPHONE ENCOUNTER
Patient offered appointment to be seen by  for bilateral leg weakness, but wanted to keep appointment with  as he wants to address memory loss also.

## 2018-05-17 ENCOUNTER — TELEPHONE (OUTPATIENT)
Dept: GASTROENTEROLOGY | Facility: CLINIC | Age: 78
End: 2018-05-17

## 2018-05-17 NOTE — TELEPHONE ENCOUNTER
----- Message from Sun Bird sent at 5/16/2018  3:02 PM CDT -----  Contact: self - [phone]  Valeriano velez f/u office visit for colitis - please call patient at [phone] before 1pm

## 2018-05-18 ENCOUNTER — TELEPHONE (OUTPATIENT)
Dept: GASTROENTEROLOGY | Facility: CLINIC | Age: 78
End: 2018-05-18

## 2018-05-18 NOTE — TELEPHONE ENCOUNTER
Returned patient's call. Patient will keep his scheduled appointment with Dr. Bal. Patient placed on wait list for sooner appointment.

## 2018-05-18 NOTE — TELEPHONE ENCOUNTER
----- Message from Laura Macario sent at 5/17/2018  4:07 PM CDT -----  Contact: Patient   Patient is calling because he has an appointment set up in July but states that he needs to be seen sooner even if its with another provider.     Please call patient at [phone].     Patient will be home to take call.

## 2018-06-08 ENCOUNTER — NURSE TRIAGE (OUTPATIENT)
Dept: ADMINISTRATIVE | Facility: CLINIC | Age: 78
End: 2018-06-08

## 2018-06-08 ENCOUNTER — HOSPITAL ENCOUNTER (EMERGENCY)
Facility: HOSPITAL | Age: 78
Discharge: HOME | End: 2018-06-09
Attending: EMERGENCY MEDICINE | Admitting: EMERGENCY MEDICINE
Payer: COMMERCIAL

## 2018-06-08 DIAGNOSIS — R04.0 EPISTAXIS: ICD-10-CM

## 2018-06-08 DIAGNOSIS — R04.0 RIGHT-SIDED EPISTAXIS: ICD-10-CM

## 2018-06-08 DIAGNOSIS — R04.0 LEFT-SIDED EPISTAXIS: Primary | ICD-10-CM

## 2018-06-08 LAB
ABO + RH BLD: NORMAL
ANION GAP SERPL CALC-SCNC: 9 MMOL/L
BASOPHILS # BLD AUTO: 0.04 K/UL
BASOPHILS NFR BLD: 0.5 %
BLD GP AB SCN CELLS X3 SERPL QL: NORMAL
BUN SERPL-MCNC: 13 MG/DL
CALCIUM SERPL-MCNC: 9.5 MG/DL
CHLORIDE SERPL-SCNC: 107 MMOL/L
CO2 SERPL-SCNC: 19 MMOL/L
CREAT SERPL-MCNC: 1.1 MG/DL
DIFFERENTIAL METHOD: ABNORMAL
EOSINOPHIL # BLD AUTO: 0.2 K/UL
EOSINOPHIL NFR BLD: 2.4 %
ERYTHROCYTE [DISTWIDTH] IN BLOOD BY AUTOMATED COUNT: 13.2 %
EST. GFR  (AFRICAN AMERICAN): >60 ML/MIN/1.73 M^2
EST. GFR  (NON AFRICAN AMERICAN): >60 ML/MIN/1.73 M^2
GLUCOSE SERPL-MCNC: 114 MG/DL
HCT VFR BLD AUTO: 38.1 %
HGB BLD-MCNC: 13.6 G/DL
LYMPHOCYTES # BLD AUTO: 2.6 K/UL
LYMPHOCYTES NFR BLD: 32.8 %
MCH RBC QN AUTO: 42.8 PG
MCHC RBC AUTO-ENTMCNC: 35.7 G/DL
MCV RBC AUTO: 120 FL
MONOCYTES # BLD AUTO: 0.8 K/UL
MONOCYTES NFR BLD: 9.4 %
NEUTROPHILS # BLD AUTO: 4.4 K/UL
NEUTROPHILS NFR BLD: 54.8 %
PLATELET # BLD AUTO: 254 K/UL
PMV BLD AUTO: 8.8 FL
POTASSIUM SERPL-SCNC: 4.3 MMOL/L
RBC # BLD AUTO: 3.18 M/UL
SODIUM SERPL-SCNC: 135 MMOL/L
WBC # BLD AUTO: 8.06 K/UL

## 2018-06-08 PROCEDURE — 80048 BASIC METABOLIC PNL TOTAL CA: CPT

## 2018-06-08 PROCEDURE — 85025 COMPLETE CBC W/AUTO DIFF WBC: CPT

## 2018-06-08 PROCEDURE — 30901 CONTROL OF NOSEBLEED: CPT | Mod: 50

## 2018-06-08 PROCEDURE — 99285 EMERGENCY DEPT VISIT HI MDM: CPT | Mod: 25

## 2018-06-08 PROCEDURE — 25000003 PHARM REV CODE 250: Performed by: EMERGENCY MEDICINE

## 2018-06-08 PROCEDURE — 86850 RBC ANTIBODY SCREEN: CPT

## 2018-06-08 RX ORDER — OXYMETAZOLINE HCL 0.05 %
1 SPRAY, NON-AEROSOL (ML) NASAL
Status: COMPLETED | OUTPATIENT
Start: 2018-06-08 | End: 2018-06-08

## 2018-06-08 RX ORDER — ATORVASTATIN CALCIUM 40 MG/1
40 TABLET, FILM COATED ORAL DAILY
Refills: 1 | COMMUNITY
Start: 2018-03-25 | End: 2019-01-14 | Stop reason: SDUPTHER

## 2018-06-08 RX ORDER — LIDOCAINE HYDROCHLORIDE AND EPINEPHRINE 20; 10 MG/ML; UG/ML
1 INJECTION, SOLUTION INFILTRATION; PERINEURAL ONCE
Status: COMPLETED | OUTPATIENT
Start: 2018-06-08 | End: 2018-06-08

## 2018-06-08 RX ADMIN — LIDOCAINE HYDROCHLORIDE,EPINEPHRINE BITARTRATE 1 ML: 20; .01 INJECTION, SOLUTION INFILTRATION; PERINEURAL at 07:06

## 2018-06-08 RX ADMIN — OXYMETAZOLINE HYDROCHLORIDE 1 SPRAY: 5 SPRAY NASAL at 07:06

## 2018-06-08 NOTE — ED TRIAGE NOTES
Pt presents to the ED with c/o intermittent nose bleeds since last night. Pt denies hx of nose bleeds. Pt reports being on a prescribed blood thinner. Denies any other associated symptoms. Nose bleed absent at present but pt's shirt has a large amount of dry blood.

## 2018-06-08 NOTE — ED PROVIDER NOTES
Encounter Date: 6/8/2018    SCRIBE #1 NOTE: I, Cj Reardon, am scribing for, and in the presence of, Dr. Zepeda .       History     Chief Complaint   Patient presents with    Epistaxis     Pt c/o nosebleed intermittently since last night. No hx of nosebleeds.      Time seen by provider: 6:40 PM    This is a 77 y.o. Male, with history of HTN, HIV, prostate cancer, and CAD, who presents with complaint of intermittent nosebleeds that began yesterday. Patient reports heavy amount of bleeding that subsided on its own yesterday. He began experiencing a nose bleed again one hour ago. He has never experienced this before and denies recent illness. He denies fevers, chills, headaches, dizziness, cough, rhinorrhea, sore throat, congestion, sinus pain, sinus pressure, SOB, chest pain, abdominal pain, N/V/D, or urinary symptoms. He is not on blood thinners. He has a past surgical history that includes neck surgery, colectomy, and cardiac stent placements. NKDA.             Review of patient's allergies indicates:  No Known Allergies  Past Medical History:   Diagnosis Date    Anemia     Anxiety     Coronary artery disease     Depression     HIV (human immunodeficiency virus infection)     Hypertension     Inflammatory bowel disease     colitis    Malignant neoplasm of colon, unspecified site     Colon cancer    Malignant neoplasm of colon, unspecified site     Colon cancer    Prostate cancer     STD (sexually transmitted disease)      Past Surgical History:   Procedure Laterality Date    cardiac stents      COLON SURGERY      NECK SURGERY      radiation       Family History   Problem Relation Age of Onset    Colon cancer Paternal Grandmother     Stomach cancer Neg Hx     Rectal cancer Neg Hx     Irritable bowel syndrome Neg Hx     Esophageal cancer Neg Hx     Crohn's disease Neg Hx     Celiac disease Neg Hx      Social History   Substance Use Topics    Smoking status: Former Smoker     Packs/day: 1.00      Years: 10.00     Types: Cigarettes    Smokeless tobacco: Never Used    Alcohol use Yes      Comment: occ drinks     Review of Systems   Constitutional: Negative for chills and fever.   HENT: Positive for nosebleeds. Negative for congestion, rhinorrhea, sinus pain, sinus pressure and sore throat.    Respiratory: Negative for cough and shortness of breath.    Cardiovascular: Negative for chest pain.   Gastrointestinal: Negative for abdominal pain, diarrhea, nausea and vomiting.   Genitourinary: Negative for decreased urine volume, difficulty urinating, dysuria, frequency and urgency.   Musculoskeletal: Negative for back pain.   Skin: Negative for rash.   Neurological: Negative for dizziness and headaches.   Psychiatric/Behavioral: Negative for confusion.       Physical Exam     Initial Vitals [06/08/18 1828]   BP Pulse Resp Temp SpO2   114/82 96 18 97.6 °F (36.4 °C) 98 %      MAP       92.67         Physical Exam    Nursing note and vitals reviewed.  Constitutional: He appears well-developed and well-nourished. No distress.   HENT:   Head: Normocephalic and atraumatic.   Dry blood in left and right naris. No deformities of nose. Dry blood in oropharynx.    Eyes: Conjunctivae and EOM are normal. Pupils are equal, round, and reactive to light.   Neck: Normal range of motion. Neck supple.   Cardiovascular: Normal rate, regular rhythm and normal heart sounds.   No murmur heard.  Pulmonary/Chest: Breath sounds normal. No respiratory distress. He has no wheezes. He has no rhonchi. He has no rales.   Musculoskeletal: Normal range of motion.   Neurological: He is alert and oriented to person, place, and time. He has normal strength.   Skin: Skin is warm and dry.   Psychiatric: He has a normal mood and affect. His behavior is normal. Judgment and thought content normal.         ED Course   Epistaxis Mgmt  Date/Time: 6/8/2018 8:13 PM  Performed by: DARYN MACKENZIE  Authorized by: DARYN MACKENZIE   Consent Done: Not  Needed  Anesthesia: local infiltration    Anesthesia:  Local Anesthetic: lidocaine 2% with epinephrine  Anesthetic total: 4 mL  Patient sedated: no  Treatment site: right anterior and left anterior  Repair method: anterior pack, oxymetazoline and Rhino Rocket  Post-procedure assessment: bleeding decreased  Treatment complexity: simple        Labs Reviewed - No data to display       No orders to display        Medical Decision Making:   Clinical Tests:   Lab Tests: Ordered and Reviewed  ED Management:  Evaluation spontaneous epistaxis.  No anticoagulation.  HIV positive. Patient thought it began on the right, he has copiously bleeding from both.  It is initially stopped with holding pressure.  He then rebleed.  We placed bilateral 2 x 2 with lidocaine and epi.  This does stop the bleeding briefly, but he begins to bleed again, feels that trickling down his throat. I then place a rhino rocket in the right naris.   this seems to work for little while then he has bleeding on the left naris.  He does seem to have a nervous habit unable to stop touching his nose. I am concerned this is causing clot dislodgement with each attempt.  Unfortunately he does not tolerate very good placement of the rhino rocket the left he claims it is too painful.  His only [facility] placed.  Not surprisingly this does not seem to do much to stop bleeding.  At this point he has been here for 4 hr with unsuccessful stemming the flow.  He also reports that even if we were to stop the flow with rhino rockets he would feel very uncomfortable going home with bilateral rhino rockets.  We have no ENT in our hospital for observation.  I have arranged transfer to the [facility] for ENT evaluation in the emergency department there.  Patient is agreeable with this plan.    TIM Zepeda M.D.  06/08/2018  10:51 PM              Scribe Attestation:   Scribe #1: I performed the above scribed service and the documentation accurately describes  the services I performed. I attest to the accuracy of the note.    Attending Attestation:           Physician Attestation for Scribe:  Physician Attestation Statement for Scribe #1: I, Dr. Zepeda, reviewed documentation, as scribed by Cj Reardon  in my presence, and it is both accurate and complete.                    Clinical Impression:     1. Left-sided epistaxis    2. Epistaxis    3. Right-sided epistaxis                                   Willie Zepeda MD  06/08/18 6539

## 2018-06-08 NOTE — TELEPHONE ENCOUNTER
Filled face towel    Reason for Disposition   Taking Coumadin (warfarin) or other strong blood thinner, or known bleeding disorder (e.g., thrombocytopenia)    Protocols used: ST NOSEBLEED-A-OH

## 2018-06-09 ENCOUNTER — HOSPITAL ENCOUNTER (EMERGENCY)
Facility: HOSPITAL | Age: 78
Discharge: HOME | End: 2018-06-09
Attending: EMERGENCY MEDICINE
Payer: COMMERCIAL

## 2018-06-09 VITALS
OXYGEN SATURATION: 97 % | RESPIRATION RATE: 20 BRPM | BODY MASS INDEX: 24.29 KG/M2 | TEMPERATURE: 97 F | WEIGHT: 132 LBS | SYSTOLIC BLOOD PRESSURE: 113 MMHG | HEART RATE: 100 BPM | HEIGHT: 62 IN | DIASTOLIC BLOOD PRESSURE: 78 MMHG

## 2018-06-09 VITALS
RESPIRATION RATE: 18 BRPM | TEMPERATURE: 98 F | WEIGHT: 132 LBS | HEART RATE: 100 BPM | HEIGHT: 63 IN | OXYGEN SATURATION: 98 % | SYSTOLIC BLOOD PRESSURE: 124 MMHG | DIASTOLIC BLOOD PRESSURE: 73 MMHG | BODY MASS INDEX: 23.39 KG/M2

## 2018-06-09 DIAGNOSIS — R04.0 EPISTAXIS: Primary | ICD-10-CM

## 2018-06-09 PROCEDURE — 99284 EMERGENCY DEPT VISIT MOD MDM: CPT | Mod: 27

## 2018-06-09 PROCEDURE — 25000003 PHARM REV CODE 250: Performed by: EMERGENCY MEDICINE

## 2018-06-09 PROCEDURE — 99283 EMERGENCY DEPT VISIT LOW MDM: CPT | Mod: ,,, | Performed by: EMERGENCY MEDICINE

## 2018-06-09 PROCEDURE — 99285 EMERGENCY DEPT VISIT HI MDM: CPT

## 2018-06-09 RX ORDER — OXYMETAZOLINE HCL 0.05 %
2 SPRAY, NON-AEROSOL (ML) NASAL
Status: COMPLETED | OUTPATIENT
Start: 2018-06-09 | End: 2018-06-09

## 2018-06-09 RX ORDER — LORAZEPAM 1 MG/1
1 TABLET ORAL EVERY 6 HOURS PRN
COMMUNITY
End: 2019-03-28

## 2018-06-09 RX ADMIN — OXYMETAZOLINE HYDROCHLORIDE 2 SPRAY: 5 SPRAY NASAL at 01:06

## 2018-06-09 NOTE — CONSULTS
[facility]  Otorhinolaryngology-Head & Neck Surgery  Consult Note    Patient Name: Aroldo Johnston Jr.  MRN: [id number]  Code Status: No Order  Admission Date: 6/8/2018  Hospital Length of Stay: 0 days  Attending Physician: Giovanni Carpio MD  Primary Care Provider: Cory Gardner MD    Patient information was obtained from patient and ER records.     Consults  Subjective:     Chief Complaint/Reason for Admission: Epistaxis    History of Present Illness:   Mr Johnston is a 76yo M with PMH below presents to the ED as a transfer from [facility] for evaluation of epistaxis. Patient reports nose bleed that started last night, was seen at the [facility] ED, 2 rhinorockets (1 unsuccessfully) were placed. On the ride to the hospital he sneezed out one rhinorocket. He denies any current nasal dripping. Voice is strong. Denies any previous history of epistaxis. Denies any blood thinner use.     Past Medical History:   Diagnosis Date    Anemia     Anxiety     Coronary artery disease     Depression     HIV (human immunodeficiency virus infection)     Hypertension     Inflammatory bowel disease     colitis    Malignant neoplasm of colon, unspecified site     Colon cancer    Malignant neoplasm of colon, unspecified site     Colon cancer    Prostate cancer     STD (sexually transmitted disease)        No current facility-administered medications for this encounter.     Current Outpatient Prescriptions:     atorvastatin (LIPITOR) 40 MG tablet, , Disp: , Rfl: 1    cyanocobalamin (VITAMIN B-12) 1000 MCG tablet, Take 1 tablet (1,000 mcg total) by mouth once daily., Disp: 90 tablet, Rfl: 1    indinavir (CRIXIVAN) 400 MG capsule, Take 2 capsules (800 mg total) by mouth 3 (three) times daily., Disp: 180 capsule, Rfl: 0    lamivudine-zidovudine 150-300mg (COMBIVIR) 150-300 mg Tab, Take 1 tablet by mouth 2 (two) times daily., Disp: 60 tablet, Rfl: 0    meclizine (ANTIVERT) 25 mg tablet, Take 1 tablet (25  mg total) by mouth 3 (three) times daily as needed., Disp: 20 tablet, Rfl: 0    mirtazapine (REMERON) 15 MG tablet, Take 1 tablet (15 mg total) by mouth every evening., Disp: 90 tablet, Rfl: 2    mirtazapine (REMERON) 15 MG tablet, TAKE 1 TABLET(15 MG) BY MOUTH EVERY EVENING, Disp: 90 tablet, Rfl: 2    ondansetron (ZOFRAN) 4 MG tablet, Take 1 tablet (4 mg total) by mouth every 6 (six) hours., Disp: 12 tablet, Rfl: 0    Past Surgical History:   Procedure Laterality Date    cardiac stents      COLON SURGERY      NECK SURGERY      radiation         Social History   Substance Use Topics    Smoking status: Former Smoker     Packs/day: 1.00     Years: 10.00     Types: Cigarettes    Smokeless tobacco: Never Used    Alcohol use Yes      Comment: occ drinks       Family History   Problem Relation Age of Onset    Colon cancer Paternal Grandmother     Stomach cancer Neg Hx     Rectal cancer Neg Hx     Irritable bowel syndrome Neg Hx     Esophageal cancer Neg Hx     Crohn's disease Neg Hx     Celiac disease Neg Hx        Review of patient's allergies indicates:  No Known Allergies        Medications:  Continuous Infusions:  Scheduled Meds:  PRN Meds:     No current facility-administered medications on file prior to encounter.      Current Outpatient Prescriptions on File Prior to Encounter   Medication Sig    cyanocobalamin (VITAMIN B-12) 1000 MCG tablet Take 1 tablet (1,000 mcg total) by mouth once daily.    indinavir (CRIXIVAN) 400 MG capsule Take 2 capsules (800 mg total) by mouth 3 (three) times daily.    lamivudine-zidovudine 150-300mg (COMBIVIR) 150-300 mg Tab Take 1 tablet by mouth 2 (two) times daily.    meclizine (ANTIVERT) 25 mg tablet Take 1 tablet (25 mg total) by mouth 3 (three) times daily as needed.    mirtazapine (REMERON) 15 MG tablet Take 1 tablet (15 mg total) by mouth every evening.    mirtazapine (REMERON) 15 MG tablet TAKE 1 TABLET(15 MG) BY MOUTH EVERY EVENING    ondansetron  (ZOFRAN) 4 MG tablet Take 1 tablet (4 mg total) by mouth every 6 (six) hours.       Review of patient's allergies indicates:  No Known Allergies    Past Medical History:   Diagnosis Date    Anemia     Anxiety     Coronary artery disease     Depression     HIV (human immunodeficiency virus infection)     Hypertension     Inflammatory bowel disease     colitis    Malignant neoplasm of colon, unspecified site     Colon cancer    Malignant neoplasm of colon, unspecified site     Colon cancer    Prostate cancer     STD (sexually transmitted disease)      Past Surgical History:   Procedure Laterality Date    cardiac stents      COLON SURGERY      NECK SURGERY      radiation       Family History     Problem Relation (Age of Onset)    Colon cancer Paternal Grandmother        Social History Main Topics    Smoking status: Former Smoker     Packs/day: 1.00     Years: 10.00     Types: Cigarettes    Smokeless tobacco: Never Used    Alcohol use Yes      Comment: occ drinks    Drug use: No    Sexual activity: Not on file     Review of Systems   Constitutional: Negative for activity change, chills, fatigue and fever.   HENT: Positive for nosebleeds. Negative for ear discharge, ear pain, hearing loss, sinus pain, sinus pressure and trouble swallowing.    Eyes: Negative for pain and itching.   Respiratory: Negative for apnea.    Cardiovascular: Negative for chest pain.   Gastrointestinal: Negative for diarrhea and vomiting.   Endocrine: Negative for polydipsia and polyphagia.   Genitourinary: Negative for difficulty urinating and frequency.   Neurological: Negative for dizziness and numbness.   Hematological: Negative for adenopathy.   Psychiatric/Behavioral: Positive for agitation.     Objective:     Vital Signs (Most Recent):  Temp: 96.7 °F (35.9 °C) (06/09/18 0027)  Pulse: 100 (06/09/18 0027)  Resp: 20 (06/09/18 0027)  BP: 113/78 (06/09/18 0027)  SpO2: 97 % (06/09/18 0027) Vital Signs (24h Range):  Temp:  [96.7  °F (35.9 °C)-98.2 °F (36.8 °C)] 96.7 °F (35.9 °C)  Pulse:  [] 100  Resp:  [18-20] 20  SpO2:  [97 %-98 %] 97 %  BP: (113-133)/(78-92) 113/78     Weight: 59.9 kg (132 lb)  Body mass index is 24.14 kg/m².         Physical Exam   Constitutional: He is oriented to person, place, and time. He appears well-developed and well-nourished.   HENT:   Head: Normocephalic and atraumatic.       Mouth/Throat: Oropharynx is clear and moist and mucous membranes are normal. No oral lesions. No lacerations or dental caries. Tonsils are 2+ on the right. Tonsils are 2+ on the left.   No dripping from NP into OP   Cardiovascular: Normal rate.    Pulmonary/Chest: Effort normal.   Abdominal: Soft.   Musculoskeletal: Normal range of motion.   Neurological: He is alert and oriented to person, place, and time.   Skin: Skin is warm and dry. Capillary refill takes less than 2 seconds.       Significant Labs:  CBC:   Recent Labs  Lab 06/08/18 1911   WBC 8.06   RBC 3.18*   HGB 13.6*   HCT 38.1*      *   MCH 42.8*   MCHC 35.7     CMP:   Recent Labs  Lab 06/08/18 1911   *   CALCIUM 9.5   *   K 4.3   CO2 19*      BUN 13   CREATININE 1.1       Significant Diagnostics:  None    Assessment/Plan:     Epistaxis    - Rhino rockets removed  - Small amount of fibrillar placed in nasal cavity against septum bilaterally  - OK to d/c home from ENT stand point  - No heavy lifting (nothing heavier than a gallon of milk)  - No straining  - Sneeze with an open mouth  - No blowing nose  - No bending over  - No drinking with a straw  - [place] spray PRN  - Can follow up with Dr Bernal as OP in 2 weeks            VTE Risk Mitigation     None          Thank you for your consult. I will sign off. Please contact us if you have any additional questions.    Chavo Perez MD  Otorhinolaryngology-Head & Neck Surgery  [facility]

## 2018-06-09 NOTE — HPI
Mr Johnston is a 76yo M with PMH below presents to the ED as a transfer from [facility] for evaluation of epistaxis. Patient reports nose bleed that started last night, was seen at the [facility] ED, 2 rhinorockets (1 unsuccessfully) were placed. On the ride to the hospital he sneezed out one rhinorocket. He denies any current nasal dripping. Voice is strong. Denies any previous history of epistaxis. Denies any blood thinner use.     Past Medical History:   Diagnosis Date    Anemia     Anxiety     Coronary artery disease     Depression     HIV (human immunodeficiency virus infection)     Hypertension     Inflammatory bowel disease     colitis    Malignant neoplasm of colon, unspecified site     Colon cancer    Malignant neoplasm of colon, unspecified site     Colon cancer    Prostate cancer     STD (sexually transmitted disease)        No current facility-administered medications for this encounter.     Current Outpatient Prescriptions:     atorvastatin (LIPITOR) 40 MG tablet, , Disp: , Rfl: 1    cyanocobalamin (VITAMIN B-12) 1000 MCG tablet, Take 1 tablet (1,000 mcg total) by mouth once daily., Disp: 90 tablet, Rfl: 1    indinavir (CRIXIVAN) 400 MG capsule, Take 2 capsules (800 mg total) by mouth 3 (three) times daily., Disp: 180 capsule, Rfl: 0    lamivudine-zidovudine 150-300mg (COMBIVIR) 150-300 mg Tab, Take 1 tablet by mouth 2 (two) times daily., Disp: 60 tablet, Rfl: 0    meclizine (ANTIVERT) 25 mg tablet, Take 1 tablet (25 mg total) by mouth 3 (three) times daily as needed., Disp: 20 tablet, Rfl: 0    mirtazapine (REMERON) 15 MG tablet, Take 1 tablet (15 mg total) by mouth every evening., Disp: 90 tablet, Rfl: 2    mirtazapine (REMERON) 15 MG tablet, TAKE 1 TABLET(15 MG) BY MOUTH EVERY EVENING, Disp: 90 tablet, Rfl: 2    ondansetron (ZOFRAN) 4 MG tablet, Take 1 tablet (4 mg total) by mouth every 6 (six) hours., Disp: 12 tablet, Rfl: 0    Past Surgical History:   Procedure Laterality Date     cardiac stents      COLON SURGERY      NECK SURGERY      radiation         Social History   Substance Use Topics    Smoking status: Former Smoker     Packs/day: 1.00     Years: 10.00     Types: Cigarettes    Smokeless tobacco: Never Used    Alcohol use Yes      Comment: occ drinks       Family History   Problem Relation Age of Onset    Colon cancer Paternal Grandmother     Stomach cancer Neg Hx     Rectal cancer Neg Hx     Irritable bowel syndrome Neg Hx     Esophageal cancer Neg Hx     Crohn's disease Neg Hx     Celiac disease Neg Hx        Review of patient's allergies indicates:  No Known Allergies

## 2018-06-09 NOTE — ASSESSMENT & PLAN NOTE
- Rhino rockets removed  - Small amount of fibrillar placed in nasal cavity against septum bilaterally  - OK to d/c home from ENT stand point  - No heavy lifting (nothing heavier than a gallon of milk)  - No straining  - Sneeze with an open mouth  - No blowing nose  - No bending over  - No drinking with a straw  - [place] spray PRN  - Can follow up with Dr Bernal as OP in 2 weeks

## 2018-06-09 NOTE — ED PROVIDER NOTES
Encounter Date: 6/9/2018    SCRIBE #1 NOTE: I, Ashleigh Matthews, am scribing for, and in the presence of,  Dr. Aceves. I have scribed the entire note.       History     Chief Complaint   Patient presents with    Epistaxis     pt states he was seen in er last night in er for same. seen by ent.      Time patient was seen by the provider: 1:50 PM      The patient is a 77 y.o. male with co-morbidities including: HTN, HIV, prostate cancer and anemia who presents to the ED with a complaint of intermittent epistaxis. Patient was seen at [facility] last night for similar complaint and returned here in the ED 12 hours ago for same. He is here now because the nosebleed has started suddenly again.      The history is provided by the patient and medical records.     Review of patient's allergies indicates:  No Known Allergies  Past Medical History:   Diagnosis Date    Anemia     Anxiety     Coronary artery disease     Depression     HIV (human immunodeficiency virus infection)     Hypertension     Inflammatory bowel disease     colitis    Malignant neoplasm of colon, unspecified site     Colon cancer    Malignant neoplasm of colon, unspecified site     Colon cancer    Prostate cancer     STD (sexually transmitted disease)      Past Surgical History:   Procedure Laterality Date    cardiac stents      COLON SURGERY      NECK SURGERY      radiation       Family History   Problem Relation Age of Onset    Colon cancer Paternal Grandmother     Stomach cancer Neg Hx     Rectal cancer Neg Hx     Irritable bowel syndrome Neg Hx     Esophageal cancer Neg Hx     Crohn's disease Neg Hx     Celiac disease Neg Hx      Social History   Substance Use Topics    Smoking status: Former Smoker     Packs/day: 1.00     Years: 10.00     Types: Cigarettes    Smokeless tobacco: Never Used    Alcohol use Yes      Comment: occ drinks     Review of Systems   Constitutional: Negative for fever.   HENT: Positive for nosebleeds. Negative  for sore throat.    Respiratory: Negative for shortness of breath.    Cardiovascular: Negative for chest pain.   Gastrointestinal: Negative for nausea.   Genitourinary: Negative for dysuria.   Musculoskeletal: Negative for back pain.   Skin: Negative for rash.   Neurological: Negative for weakness.   Hematological: Does not bruise/bleed easily.       Physical Exam     Initial Vitals [06/09/18 1320]   BP Pulse Resp Temp SpO2   111/70 98 18 97.8 °F (36.6 °C) 96 %      MAP       83.67         Physical Exam    Nursing note and vitals reviewed.    Appearance: No acute distress. Fair amount of dried blood on clothes.  Skin: No rashes seen.  Good turgor.  No abrasions.  No ecchymoses.  Eyes: No conjunctival injection.  ENT: Oropharynx clear. Dried blood in both nares. Difficult to visualize due to nose hair.   Chest: Clear to auscultation bilaterally.  Good air movement.  No wheezes.  No rhonchi.  Cardiovascular: Regular rate and rhythm.  No murmurs. No gallops. No rubs.  Abdomen: Soft.  Not distended.  Nontender.  No guarding.  No rebound.  Musculoskeletal: Good range of motion all joints.  No deformities.  Neck supple.  No meningismus.  Neurologic: Motor intact.  Sensation intact.  Cerebellar intact.  Cranial nerves intact.  Mental Status:  Alert and oriented x 3.  Appropriate, conversant.     ED Course   Procedures  Labs Reviewed - No data to display       No orders to display        Medical Decision Making:   History:   Old Medical Records: I decided to obtain old medical records.  Initial Assessment:   77 y.o. male presents with nosebleed. Seen 12 hours ago for same and discharged. Currently resolved. Will have ENT look at him again.     2:44 PM  Refused packing. ENT said there is nothing else to offer beyond supportive care. Will continue that at home and return if needed.  The patient was quite rude to ENT, myself and our nursing team.  Other:   I have discussed this case with another health care provider.       <>  Summary of the Discussion: ENT            Scribe Attestation:   Scribe #1: I performed the above scribed service and the documentation accurately describes the services I performed. I attest to the accuracy of the note.               Clinical Impression:   The encounter diagnosis was Epistaxis.      Disposition:   Disposition: Discharged  Condition: Stable                        Valentino Aceves MD  06/10/18 1116

## 2018-06-09 NOTE — CONSULTS
Asked to come evaluate patient again by the ED.    In brief patient with HIV presented early this am as a transfer from [facility] for evaluation of epistaxis. Patient unsuccessfully had bilateral rhino rockets placed at [facility] by ED, upon arrival one was completely removed and the other side hanging out. On my evaluation there were no obvious sites of bleeding, decision was made to place absorbabe packing in his nose bilatearlly.     Patient returned 8 hours later with complaints of epistaxis, again there was no evidence of recent or currenty bleeding on my exam. I advised the patient to have bilateral nasal packs placed as he was concerned about bleeding happening again. Patient declined my intervention and decided to leave [place]. He became increasingly frustrated and difficult to work with throughout out my second evaluation. Again I stressed the need to place packs in order to prevent epistaxis recurring again.     Chavo Perez MD

## 2018-06-09 NOTE — ED NOTES
Pt sitting on exam table, actively bleeding from nose, RR even and unlabored, family at bedside, will continue to monitor

## 2018-06-09 NOTE — ED NOTES
"Pt presents with epistaxis since Thursday. Pt reports hx of anemia. Pt states the bleeding lasts up to 45 minutes at a time and is "profuse" like a "hemorrhage". Bleeding is controled at this time and pt denies taking blood thinners. Pt states he was seen at [facility] yesterday and transferred here to see ENT.   "

## 2018-06-09 NOTE — SUBJECTIVE & OBJECTIVE
Medications:  Continuous Infusions:  Scheduled Meds:  PRN Meds:     No current facility-administered medications on file prior to encounter.      Current Outpatient Prescriptions on File Prior to Encounter   Medication Sig    cyanocobalamin (VITAMIN B-12) 1000 MCG tablet Take 1 tablet (1,000 mcg total) by mouth once daily.    indinavir (CRIXIVAN) 400 MG capsule Take 2 capsules (800 mg total) by mouth 3 (three) times daily.    lamivudine-zidovudine 150-300mg (COMBIVIR) 150-300 mg Tab Take 1 tablet by mouth 2 (two) times daily.    meclizine (ANTIVERT) 25 mg tablet Take 1 tablet (25 mg total) by mouth 3 (three) times daily as needed.    mirtazapine (REMERON) 15 MG tablet Take 1 tablet (15 mg total) by mouth every evening.    mirtazapine (REMERON) 15 MG tablet TAKE 1 TABLET(15 MG) BY MOUTH EVERY EVENING    ondansetron (ZOFRAN) 4 MG tablet Take 1 tablet (4 mg total) by mouth every 6 (six) hours.       Review of patient's allergies indicates:  No Known Allergies    Past Medical History:   Diagnosis Date    Anemia     Anxiety     Coronary artery disease     Depression     HIV (human immunodeficiency virus infection)     Hypertension     Inflammatory bowel disease     colitis    Malignant neoplasm of colon, unspecified site     Colon cancer    Malignant neoplasm of colon, unspecified site     Colon cancer    Prostate cancer     STD (sexually transmitted disease)      Past Surgical History:   Procedure Laterality Date    cardiac stents      COLON SURGERY      NECK SURGERY      radiation       Family History     Problem Relation (Age of Onset)    Colon cancer Paternal Grandmother        Social History Main Topics    Smoking status: Former Smoker     Packs/day: 1.00     Years: 10.00     Types: Cigarettes    Smokeless tobacco: Never Used    Alcohol use Yes      Comment: occ drinks    Drug use: No    Sexual activity: Not on file     Review of Systems   Constitutional: Negative for activity change,  chills, fatigue and fever.   HENT: Positive for nosebleeds. Negative for ear discharge, ear pain, hearing loss, sinus pain, sinus pressure and trouble swallowing.    Eyes: Negative for pain and itching.   Respiratory: Negative for apnea.    Cardiovascular: Negative for chest pain.   Gastrointestinal: Negative for diarrhea and vomiting.   Endocrine: Negative for polydipsia and polyphagia.   Genitourinary: Negative for difficulty urinating and frequency.   Neurological: Negative for dizziness and numbness.   Hematological: Negative for adenopathy.   Psychiatric/Behavioral: Positive for agitation.     Objective:     Vital Signs (Most Recent):  Temp: 96.7 °F (35.9 °C) (06/09/18 0027)  Pulse: 100 (06/09/18 0027)  Resp: 20 (06/09/18 0027)  BP: 113/78 (06/09/18 0027)  SpO2: 97 % (06/09/18 0027) Vital Signs (24h Range):  Temp:  [96.7 °F (35.9 °C)-98.2 °F (36.8 °C)] 96.7 °F (35.9 °C)  Pulse:  [] 100  Resp:  [18-20] 20  SpO2:  [97 %-98 %] 97 %  BP: (113-133)/(78-92) 113/78     Weight: 59.9 kg (132 lb)  Body mass index is 24.14 kg/m².         Physical Exam   Constitutional: He is oriented to person, place, and time. He appears well-developed and well-nourished.   HENT:   Head: Normocephalic and atraumatic.       Mouth/Throat: Oropharynx is clear and moist and mucous membranes are normal. No oral lesions. No lacerations or dental caries. Tonsils are 2+ on the right. Tonsils are 2+ on the left.   No dripping from NP into OP   Cardiovascular: Normal rate.    Pulmonary/Chest: Effort normal.   Abdominal: Soft.   Musculoskeletal: Normal range of motion.   Neurological: He is alert and oriented to person, place, and time.   Skin: Skin is warm and dry. Capillary refill takes less than 2 seconds.       Significant Labs:  CBC:   Recent Labs  Lab 06/08/18  1911   WBC 8.06   RBC 3.18*   HGB 13.6*   HCT 38.1*      *   MCH 42.8*   MCHC 35.7     CMP:   Recent Labs  Lab 06/08/18 1911   *   CALCIUM 9.5   *   K  4.3   CO2 19*      BUN 13   CREATININE 1.1       Significant Diagnostics:  None

## 2018-06-09 NOTE — ED PROVIDER NOTES
Encounter Date: 6/8/2018    SCRIBE #1 NOTE: I, Stephani Guillaume, am scribing for, and in the presence of,  Dr. Carpio. I have scribed the following portions of the note - Other sections scribed: HPI, ROS, PE.     I, Dr. Win Carpio, personally performed the services described in this documentation. All medical record entries made by the scribe were at my direction and in my presence.  I have reviewed the chart and agree that the record reflects my personal performance and is accurate and complete.  Win Carpio MD.  1:17 AM 06/09/2018      History     Chief Complaint   Patient presents with    Epistaxis     Pt c/o nosebleed intermittently since last night. No hx of nosebleeds.      Time seen by provider: 1:04 AM    This is a 77 y.o. male with medical conditions including HTN, HIV, prostate cancer, and inflammatory bowel disease, who presents with complaint of epistaxis. Pt was seen at [facility] for intermittent epistaxis since last night. Pt has no hx of nosebleeds. Pt denies any other acute sx at this time.           The history is provided by the patient.     Review of patient's allergies indicates:  No Known Allergies  Past Medical History:   Diagnosis Date    Anemia     Anxiety     Coronary artery disease     Depression     HIV (human immunodeficiency virus infection)     Hypertension     Inflammatory bowel disease     colitis    Malignant neoplasm of colon, unspecified site     Colon cancer    Malignant neoplasm of colon, unspecified site     Colon cancer    Prostate cancer     STD (sexually transmitted disease)      Past Surgical History:   Procedure Laterality Date    cardiac stents      COLON SURGERY      NECK SURGERY      radiation       Family History   Problem Relation Age of Onset    Colon cancer Paternal Grandmother     Stomach cancer Neg Hx     Rectal cancer Neg Hx     Irritable bowel syndrome Neg Hx     Esophageal cancer Neg Hx     Crohn's disease Neg Hx     Celiac disease Neg Hx       Social History   Substance Use Topics    Smoking status: Former Smoker     Packs/day: 1.00     Years: 10.00     Types: Cigarettes    Smokeless tobacco: Never Used    Alcohol use Yes      Comment: occ drinks     Review of Systems   Constitutional: Negative for chills and fever.   HENT: Positive for nosebleeds. Negative for drooling, facial swelling, rhinorrhea, sinus pain, sinus pressure and sore throat.    Respiratory: Negative for cough, shortness of breath, wheezing and stridor.    Cardiovascular: Negative for chest pain and leg swelling.   Gastrointestinal: Negative for abdominal pain, diarrhea, nausea and vomiting.   Genitourinary: Negative for dysuria.   Musculoskeletal: Negative for back pain and gait problem.   Neurological: Negative for light-headedness and headaches.   Psychiatric/Behavioral: Negative for behavioral problems and confusion.   All other systems reviewed and are negative.      Physical Exam     Initial Vitals [06/08/18 1828]   BP Pulse Resp Temp SpO2   114/82 96 18 97.6 °F (36.4 °C) 98 %      MAP       92.67         Physical Exam    Nursing note and vitals reviewed.  Constitutional: He appears well-developed and well-nourished.   HENT:   Head: Normocephalic and atraumatic.   Rhino rocket placed bilaterally  No active bleeding     Eyes: Conjunctivae and EOM are normal. Pupils are equal, round, and reactive to light.   Neck: Normal range of motion. Neck supple.   Cardiovascular: Normal rate, regular rhythm, normal heart sounds and intact distal pulses.   Pulmonary/Chest: Breath sounds normal. No respiratory distress. He has no wheezes. He has no rhonchi. He has no rales. He exhibits no tenderness.   Abdominal: Soft. Bowel sounds are normal. He exhibits no distension and no mass. There is no tenderness. There is no rebound and no guarding.   Musculoskeletal: Normal range of motion. He exhibits no edema or tenderness.   Neurological: He is alert and oriented to person, place, and time. He  has normal strength. He displays normal reflexes. No cranial nerve deficit or sensory deficit.   Skin: Skin is warm and dry.   Psychiatric: He has a normal mood and affect. His behavior is normal. Judgment and thought content normal.         ED Course   Procedures  Labs Reviewed   CBC W/ AUTO DIFFERENTIAL - Abnormal; Notable for the following:        Result Value    RBC 3.18 (*)     Hemoglobin 13.6 (*)     Hematocrit 38.1 (*)      (*)     MCH 42.8 (*)     MPV 8.8 (*)     All other components within normal limits   BASIC METABOLIC PANEL - Abnormal; Notable for the following:     Sodium 135 (*)     CO2 19 (*)     Glucose 114 (*)     All other components within normal limits   TYPE & SCREEN          No orders to display        Medical Decision Making:   History:   Old Medical Records: I decided to obtain old medical records.  Clinical Tests:   Lab Tests: Ordered and Reviewed  ED Management:  Patient's epistaxis still result, the patient was evaluated by [facility]. At this point patient is being discharged home with sodium chloride spray, and he was given instructions to follow up as needed with the [facility].  He is also told to return to the ER for any acute active symptoms and verbalized understanding of the spoken plan.            Scribe Attestation:   Scribe #1: I performed the above scribed service and the documentation accurately describes the services I performed. I attest to the accuracy of the note.               Clinical Impression:   The primary encounter diagnosis was Left-sided epistaxis. Diagnoses of Epistaxis and Right-sided epistaxis were also pertinent to this visit.                             Giovanni Carpio MD  06/09/18 0118

## 2018-06-09 NOTE — ED NOTES
LOC: The patient is awake, alert and aware of environment with an appropriate affect, the patient is oriented x 3 and speaking appropriately.  APPEARANCE: Patient resting comfortably and in no acute distress, patient has blood to face, shirt and shoes.  SKIN: The skin is warm and dry, color consistent with ethnicity, patient has normal skin turgor and moist mucus membranes, skin intact, no breakdown or brusing noted.  MUSCULOSKELETAL: Patient moving all extremities well, no obvious swelling or deformities noted.   RESPIRATORY: Airway is open and patent, breath sounds clear throughout all lung fields; respirations are spontaneous, patient has a normal effort and rate, no accessory muscle use noted.   CARDIAC: Patient has no peripheral edema noted, capillary refill < 3 seconds. No complaints of chest pain   ABDOMEN: Soft and non tender to palpation, no distention noted. Bowel sounds present x 4  NEUROLOGIC: PERRL, 3mm bilaterally, eyes open spontaneously, behavior appropriate to situation, follows commands, facial expression symmetrical, bilateral hand grasp equal and even, purposeful motor response noted, normal sensation in all extremities when touched with a finger.

## 2018-06-09 NOTE — ED NOTES
Pt arrived to ED, transfer for uncontrolled epistaxis. ENT abs moments after pt arrival. Pt states that he is being discharged. KENYA provided dc paperwork.

## 2018-06-10 ENCOUNTER — HOSPITAL ENCOUNTER (EMERGENCY)
Facility: HOSPITAL | Age: 78
Discharge: HOME | End: 2018-06-10
Attending: EMERGENCY MEDICINE
Payer: COMMERCIAL

## 2018-06-10 VITALS
HEART RATE: 109 BPM | WEIGHT: 132 LBS | TEMPERATURE: 98 F | OXYGEN SATURATION: 97 % | DIASTOLIC BLOOD PRESSURE: 63 MMHG | BODY MASS INDEX: 23.39 KG/M2 | HEIGHT: 63 IN | SYSTOLIC BLOOD PRESSURE: 117 MMHG | RESPIRATION RATE: 18 BRPM

## 2018-06-10 DIAGNOSIS — R04.0 EPISTAXIS: Primary | ICD-10-CM

## 2018-06-10 LAB
APTT BLDCRRT: <21 SEC
BASOPHILS # BLD AUTO: 0.07 K/UL
BASOPHILS NFR BLD: 0.7 %
DIFFERENTIAL METHOD: ABNORMAL
EOSINOPHIL # BLD AUTO: 0.1 K/UL
EOSINOPHIL NFR BLD: 1 %
ERYTHROCYTE [DISTWIDTH] IN BLOOD BY AUTOMATED COUNT: 12.9 %
HCT VFR BLD AUTO: 27.6 %
HGB BLD-MCNC: 10 G/DL
IMM GRANULOCYTES # BLD AUTO: 0.06 K/UL
IMM GRANULOCYTES NFR BLD AUTO: 0.6 %
INR PPP: 1
LYMPHOCYTES # BLD AUTO: 2.5 K/UL
LYMPHOCYTES NFR BLD: 23.7 %
MCH RBC QN AUTO: 43.3 PG
MCHC RBC AUTO-ENTMCNC: 36.2 G/DL
MCV RBC AUTO: 120 FL
MONOCYTES # BLD AUTO: 1.2 K/UL
MONOCYTES NFR BLD: 11.3 %
NEUTROPHILS # BLD AUTO: 6.5 K/UL
NEUTROPHILS NFR BLD: 62.7 %
NRBC BLD-RTO: 0 /100 WBC
PLATELET # BLD AUTO: 236 K/UL
PMV BLD AUTO: 8.8 FL
PROTHROMBIN TIME: 10.2 SEC
RBC # BLD AUTO: 2.31 M/UL
WBC # BLD AUTO: 10.32 K/UL

## 2018-06-10 PROCEDURE — 85730 THROMBOPLASTIN TIME PARTIAL: CPT

## 2018-06-10 PROCEDURE — 85610 PROTHROMBIN TIME: CPT

## 2018-06-10 PROCEDURE — 99283 EMERGENCY DEPT VISIT LOW MDM: CPT | Mod: ,,, | Performed by: EMERGENCY MEDICINE

## 2018-06-10 PROCEDURE — 99283 EMERGENCY DEPT VISIT LOW MDM: CPT

## 2018-06-10 PROCEDURE — 85025 COMPLETE CBC W/AUTO DIFF WBC: CPT

## 2018-06-10 RX ORDER — LIDOCAINE HYDROCHLORIDE 20 MG/ML
5 JELLY TOPICAL
Status: DISCONTINUED | OUTPATIENT
Start: 2018-06-10 | End: 2018-06-11 | Stop reason: HOSPADM

## 2018-06-11 ENCOUNTER — HOSPITAL ENCOUNTER (EMERGENCY)
Facility: HOSPITAL | Age: 78
Discharge: HOME | End: 2018-06-11
Attending: EMERGENCY MEDICINE
Payer: COMMERCIAL

## 2018-06-11 ENCOUNTER — TELEPHONE (OUTPATIENT)
Dept: NEUROLOGY | Facility: CLINIC | Age: 78
End: 2018-06-11

## 2018-06-11 ENCOUNTER — PATIENT OUTREACH (OUTPATIENT)
Dept: ADMINISTRATIVE | Facility: HOSPITAL | Age: 78
End: 2018-06-11

## 2018-06-11 VITALS
BODY MASS INDEX: 23.39 KG/M2 | WEIGHT: 132 LBS | HEIGHT: 63 IN | OXYGEN SATURATION: 100 % | TEMPERATURE: 98 F | DIASTOLIC BLOOD PRESSURE: 59 MMHG | HEART RATE: 85 BPM | RESPIRATION RATE: 20 BRPM | SYSTOLIC BLOOD PRESSURE: 128 MMHG

## 2018-06-11 VITALS
HEIGHT: 63 IN | HEART RATE: 104 BPM | OXYGEN SATURATION: 100 % | DIASTOLIC BLOOD PRESSURE: 59 MMHG | TEMPERATURE: 99 F | RESPIRATION RATE: 18 BRPM | BODY MASS INDEX: 23.04 KG/M2 | WEIGHT: 130 LBS | SYSTOLIC BLOOD PRESSURE: 150 MMHG

## 2018-06-11 DIAGNOSIS — R04.0 EPISTAXIS: Primary | ICD-10-CM

## 2018-06-11 LAB
BASOPHILS # BLD AUTO: 0.05 K/UL
BASOPHILS # BLD AUTO: 0.07 K/UL
BASOPHILS NFR BLD: 0.4 %
BASOPHILS NFR BLD: 0.4 %
DIFFERENTIAL METHOD: ABNORMAL
DIFFERENTIAL METHOD: ABNORMAL
EOSINOPHIL # BLD AUTO: 0.1 K/UL
EOSINOPHIL # BLD AUTO: 0.1 K/UL
EOSINOPHIL NFR BLD: 0.3 %
EOSINOPHIL NFR BLD: 0.5 %
ERYTHROCYTE [DISTWIDTH] IN BLOOD BY AUTOMATED COUNT: 13.2 %
ERYTHROCYTE [DISTWIDTH] IN BLOOD BY AUTOMATED COUNT: 13.6 %
HCT VFR BLD AUTO: 25.5 %
HCT VFR BLD AUTO: 26.3 %
HGB BLD-MCNC: 8.7 G/DL
HGB BLD-MCNC: 9.5 G/DL
IMM GRANULOCYTES # BLD AUTO: 0.09 K/UL
IMM GRANULOCYTES # BLD AUTO: 0.13 K/UL
IMM GRANULOCYTES NFR BLD AUTO: 0.7 %
IMM GRANULOCYTES NFR BLD AUTO: 0.8 %
LYMPHOCYTES # BLD AUTO: 1.8 K/UL
LYMPHOCYTES # BLD AUTO: 3.3 K/UL
LYMPHOCYTES NFR BLD: 14.4 %
LYMPHOCYTES NFR BLD: 20 %
MCH RBC QN AUTO: 43.6 PG
MCH RBC QN AUTO: 43.7 PG
MCHC RBC AUTO-ENTMCNC: 34.1 G/DL
MCHC RBC AUTO-ENTMCNC: 36.1 G/DL
MCV RBC AUTO: 121 FL
MCV RBC AUTO: 128 FL
MONOCYTES # BLD AUTO: 1.4 K/UL
MONOCYTES # BLD AUTO: 1.9 K/UL
MONOCYTES NFR BLD: 10.9 %
MONOCYTES NFR BLD: 11.4 %
NEUTROPHILS # BLD AUTO: 11 K/UL
NEUTROPHILS # BLD AUTO: 9.3 K/UL
NEUTROPHILS NFR BLD: 67.1 %
NEUTROPHILS NFR BLD: 73.1 %
NRBC BLD-RTO: 0 /100 WBC
NRBC BLD-RTO: 0 /100 WBC
PLATELET # BLD AUTO: 250 K/UL
PLATELET # BLD AUTO: 255 K/UL
PMV BLD AUTO: 9 FL
PMV BLD AUTO: 9.2 FL
RBC # BLD AUTO: 1.99 M/UL
RBC # BLD AUTO: 2.18 M/UL
WBC # BLD AUTO: 12.71 K/UL
WBC # BLD AUTO: 16.4 K/UL

## 2018-06-11 PROCEDURE — 99283 EMERGENCY DEPT VISIT LOW MDM: CPT

## 2018-06-11 PROCEDURE — 85025 COMPLETE CBC W/AUTO DIFF WBC: CPT

## 2018-06-11 PROCEDURE — 30901 CONTROL OF NOSEBLEED: CPT | Mod: RT

## 2018-06-11 PROCEDURE — 30901 CONTROL OF NOSEBLEED: CPT | Mod: RT,,, | Performed by: EMERGENCY MEDICINE

## 2018-06-11 PROCEDURE — 99283 EMERGENCY DEPT VISIT LOW MDM: CPT | Mod: 27

## 2018-06-11 PROCEDURE — 99283 EMERGENCY DEPT VISIT LOW MDM: CPT | Mod: 25,,, | Performed by: EMERGENCY MEDICINE

## 2018-06-11 PROCEDURE — 85025 COMPLETE CBC W/AUTO DIFF WBC: CPT | Mod: 91

## 2018-06-11 PROCEDURE — 30901 CONTROL OF NOSEBLEED: CPT | Mod: ,,, | Performed by: EMERGENCY MEDICINE

## 2018-06-11 RX ORDER — CEPHALEXIN 250 MG/1
500 CAPSULE ORAL EVERY 8 HOURS
Qty: 12 CAPSULE | Refills: 0 | Status: SHIPPED | OUTPATIENT
Start: 2018-06-11 | End: 2018-06-15

## 2018-06-11 RX ORDER — HYDROCODONE BITARTRATE AND ACETAMINOPHEN 5; 325 MG/1; MG/1
1 TABLET ORAL EVERY 4 HOURS PRN
Qty: 12 TABLET | Refills: 0 | Status: SHIPPED | OUTPATIENT
Start: 2018-06-11 | End: 2018-09-26

## 2018-06-11 NOTE — ED PROVIDER NOTES
Encounter Date: 6/11/2018    SCRIBE #1 NOTE: I, Patsy De Anda, am scribing for, and in the presence of,  Dr. Sandoval. I have scribed the following portions of the note - the APC attestation.       History     Chief Complaint   Patient presents with    Epistaxis     seen yest for same thing     77-year-old male with hx of HIV returns to the ED for evaluation of epistaxis.  This is patient's 4th visit in the past 4 days for epistaxis.  Per chart, patient seen by ENT in the ED and refused bilateral packing. He left AMA. Pt states that he began to have bleeding from the R nare around 5 am this morning. It resolved after about 1-2 hours. No active bleeding currently.  Pt reports some weakness. Pt states that he places pressure on 1 nostril when bleeding occurs. He has not been using Afrin or any other means to stop the bleeding. He denies any blood thinner use. Prior to the past few days, pt has not hx of nosebleeds.           Review of patient's allergies indicates:  No Known Allergies  Past Medical History:   Diagnosis Date    Anemia     Anxiety     Coronary artery disease     Depression     HIV (human immunodeficiency virus infection)     Hypertension     Inflammatory bowel disease     colitis    Malignant neoplasm of colon, unspecified site     Colon cancer    Malignant neoplasm of colon, unspecified site     Colon cancer    Prostate cancer     STD (sexually transmitted disease)      Past Surgical History:   Procedure Laterality Date    cardiac stents      COLON SURGERY      NECK SURGERY      radiation       Family History   Problem Relation Age of Onset    Colon cancer Paternal Grandmother     Stomach cancer Neg Hx     Rectal cancer Neg Hx     Irritable bowel syndrome Neg Hx     Esophageal cancer Neg Hx     Crohn's disease Neg Hx     Celiac disease Neg Hx      Social History   Substance Use Topics    Smoking status: Former Smoker     Packs/day: 1.00     Years: 10.00     Types: Cigarettes     Smokeless tobacco: Never Used    Alcohol use Yes      Comment: occ drinks     Review of Systems   Constitutional: Negative for fever.   HENT: Positive for nosebleeds. Negative for sore throat.    Respiratory: Negative for shortness of breath.    Cardiovascular: Negative for chest pain.   Gastrointestinal: Negative for nausea.   Genitourinary: Negative for dysuria.   Musculoskeletal: Negative for back pain.   Skin: Negative for rash.   Neurological: Positive for weakness. Negative for syncope.   Hematological: Does not bruise/bleed easily.       Physical Exam     Initial Vitals   BP Pulse Resp Temp SpO2   06/11/18 1019 06/11/18 1019 06/11/18 1019 06/11/18 1019 06/11/18 1212   135/61 (!) 111 18 97.8 °F (36.6 °C) 100 %      MAP       06/11/18 1019       85.67         Physical Exam    Nursing note and vitals reviewed.  Constitutional: He appears well-developed and well-nourished.  Non-toxic appearance. He does not appear ill. No distress.   HENT:   Head: Normocephalic and atraumatic.   Small clot in the R anterior nare. No active epistaxis.    Neck: Normal range of motion. Neck supple.   Cardiovascular: Normal rate and regular rhythm.   Pulmonary/Chest: Effort normal. No accessory muscle usage. No tachypnea. No respiratory distress.   Abdominal: He exhibits no distension.   Musculoskeletal: Normal range of motion.   Neurological: He is alert.   Skin: Skin is warm and dry. No rash noted. No pallor.   Psychiatric: He has a normal mood and affect. His behavior is normal.         ED Course   Procedures  Labs Reviewed   CBC W/ AUTO DIFFERENTIAL - Abnormal; Notable for the following:        Result Value    WBC 12.71 (*)     RBC 2.18 (*)     Hemoglobin 9.5 (*)     Hematocrit 26.3 (*)      (*)     MCH 43.6 (*)     MCHC 36.1 (*)     MPV 9.0 (*)     Immature Granulocytes 0.7 (*)     Gran # (ANC) 9.3 (*)     Immature Grans (Abs) 0.09 (*)     Mono # 1.4 (*)     Gran% 73.1 (*)     Lymph% 14.4 (*)     All other components  within normal limits          No orders to display        Medical Decision Making:   History:   Old Medical Records: I decided to obtain old medical records.  Clinical Tests:   Lab Tests: Ordered and Reviewed       APC / Resident Notes:   77-year-old male presents to the ED for the 3rd time for evaluation of epistaxis.  He is tachycardic on initial evaluation. BP wnl. No active epistaxis.   Patient observed in the ED for approximately 2 hrs without recurrence of epistaxis.  I discussed placing bilateral rhino rockets as well as to consult ENT given patient's recurrent bleeding and multiple trips to the ED.  Patient does not wish to have rhino rockets placed.  We have discussed at length proper techniques for hemostasis at home.  Patient also advised to use humidifier, intranasal saline , and Afrin as needed.  Patient feels comfortable being discharged.  Advised follow-up with ENT for persistent bleeding.  Return precautions given. I have reviewed the patient's records and discussed this case with my supervising physician.         Scribe Attestation:   Scribe #1: I performed the above scribed service and the documentation accurately describes the services I performed. I attest to the accuracy of the note.    Attending Attestation:     Physician Attestation Statement for NP/PA:   I have conducted a face to face encounter with this patient in addition to the NP/PA, due to Medical Complexity    Other NP/PA Attestation Additions:    History of Present Illness: Pt with recurrent epistaxis. No bleeding now.    Physical Exam: Pt appears comfortable. He has resolved epistaxis on the right nare. He has a 2 mm clot to the septum.    Medical Decision Making: Reviewed his medical record. Recommended we consult ENT with the pt but he declined ans asked to be released. I went over homecare of nosebleeds and use of humidifiers at night. His tachycardiac resolved on my exam.                     Clinical Impression:   The encounter  diagnosis was Epistaxis.      Disposition:   Disposition: Discharged  Condition: Stable                        Daphne Mora PA-C  06/11/18 1859

## 2018-06-11 NOTE — ED TRIAGE NOTES
Patient was seen here yesterday for a nose bleed and started having one again a couple hours ago.  Patient admits to feeling weak.

## 2018-06-11 NOTE — TELEPHONE ENCOUNTER
Patient was not able to attend appointment today due to ER visit. LM to contact our office to discuss rescheduling.

## 2018-06-11 NOTE — PROGRESS NOTES
[facility] is committed to your overall health.  To help you get the most out of each of your visits, we will review your information to make sure you are up to date on all of your recommended tests and/or procedures.       Your PCP  Cory Gardner MD   found that you may be due for:       Health Maintenance Due   Topic Date Due    Zoster Vaccine  11/03/2002    Colonoscopy  06/26/2016    Pneumococcal (65+) (2 of 2 - PCV13) 08/14/2016             If you have had any of the above done at another facility, please bring the records or information with you so that your record at [facility] will be complete.  If you would like to schedule any of these, please contact me.     If you are currently taking medication, please bring it with you to your appointment for review.     Also, if you have any type of Advanced Directives, please bring them with you to your office visit so we may scan them into your chart.       Thank you for Choosing [facility] for your healthcare needs.        Additional Information  If you have questions, you can email [email or web address] or call [phone]  to talk to our [facility] staff. Remember, [facility] is NOT to be used for urgent needs. For medical emergencies, dial 911.

## 2018-06-11 NOTE — ED PROVIDER NOTES
"Encounter Date: 6/10/2018       History     Chief Complaint   Patient presents with    Epistaxis     Third time this week. Bleeding currently controlled.     78 y/o male with HIV, HTN, CAD, and history of colon cancer presents to the ED for persistent nose bleeds. He complains of 3-4 nose bleeds today. He has been seen in the ED the past 2 days and ENT was consulted yesterday for which the patient refused packing. Pt is concerned he is losing too much blood. He complains of dizziness and "heart beating fast." He denies blowing his nose or trauma to his nose.           Review of patient's allergies indicates:  No Known Allergies  Past Medical History:   Diagnosis Date    Anemia     Anxiety     Coronary artery disease     Depression     HIV (human immunodeficiency virus infection)     Hypertension     Inflammatory bowel disease     colitis    Malignant neoplasm of colon, unspecified site     Colon cancer    Malignant neoplasm of colon, unspecified site     Colon cancer    Prostate cancer     STD (sexually transmitted disease)      Past Surgical History:   Procedure Laterality Date    cardiac stents      COLON SURGERY      NECK SURGERY      radiation       Family History   Problem Relation Age of Onset    Colon cancer Paternal Grandmother     Stomach cancer Neg Hx     Rectal cancer Neg Hx     Irritable bowel syndrome Neg Hx     Esophageal cancer Neg Hx     Crohn's disease Neg Hx     Celiac disease Neg Hx      Social History   Substance Use Topics    Smoking status: Former Smoker     Packs/day: 1.00     Years: 10.00     Types: Cigarettes    Smokeless tobacco: Never Used    Alcohol use Yes      Comment: occ drinks     Review of Systems   Constitutional: Negative.    HENT: Positive for nosebleeds and postnasal drip (bloody).    Respiratory: Negative.  Negative for chest tightness and shortness of breath.    Cardiovascular: Negative.  Negative for chest pain and palpitations.   Musculoskeletal: " Negative.        Physical Exam     Initial Vitals [06/10/18 1940]   BP Pulse Resp Temp SpO2   117/63 109 18 97.8 °F (36.6 °C) 97 %      MAP       81         Physical Exam    Nursing note and vitals reviewed.  Constitutional: He appears well-developed and well-nourished.   HENT:   Head: Normocephalic.   Nose: Epistaxis is observed.   Blood noted to patient's face; dried blood noted to bilateral nares, excessive nose hairs present    Eyes: Conjunctivae and EOM are normal.   Cardiovascular: Regular rhythm, S1 normal, S2 normal, intact distal pulses and normal pulses. Tachycardia present.    Pulmonary/Chest: Breath sounds normal. No respiratory distress. He has no wheezes. He has no rhonchi. He has no rales. He exhibits no tenderness.       ED Course   Procedures  Labs Reviewed   CBC W/ AUTO DIFFERENTIAL   APTT   PROTIME-INR          No orders to display        Medical Decision Making:   Initial Assessment:   76 y/o male with a 3 day history of nose bleeds which presents to the ED for the 3rd time due to excessive epistaxis. He is now tachycardic and is agreeable to packing his right nare.   Differential Diagnosis:   Nasal polyp, allergic rhinitis, trauma to nares  Clinical Tests:   Lab Tests: Ordered  ED Management:  Packing to right nare attempted but patient could not tolerate procedure. Pt only wanted the packing for a few seconds and not for hours/days.   Other:   I have discussed this case with another health care provider.       <> Summary of the Discussion: ENT consulted and has nothing to offer patient because he continues to refuse packing/interventions                       Clinical Impression:   The encounter diagnosis was Epistaxis.                             Mary Casas, VIVIANA  06/13/18 110

## 2018-06-11 NOTE — DISCHARGE INSTRUCTIONS
Use a humidifier at home.  Use saline nasal spray 3 times daily - can be purchased over the counter. You may use Afrin nasal spray if your bleeding does not resolve after holding pressure for 15 minutes.     Follow up with ENT next week for reevaluation if your bleeding persists.      Return to the ER if you are bleeding persistently begin to feel weak, short of breath or faint.

## 2018-06-11 NOTE — ED NOTES
Pt reports nose bleed that began two days ago with intermittent bleeding. Bleeding controlled at this time.     Patient identifiers verified and correct for Aroldo Johnston .    LOC: The patient is awake, alert and aware of environment with an appropriate affect, the patient is oriented x 3 and speaking appropriately.  APPEARANCE: Patient resting comfortably and in no acute distress, patient is clean and well groomed, patient's clothing is properly fastened.  SKIN: The skin is warm and dry, color consistent with ethnicity, patient has normal skin turgor and moist mucus membranes, skin intact, no breakdown or bruising noted.  MUSCULOSKELETAL: Patient moving all extremities spontaneously, no obvious swelling or deformities noted.  RESPIRATORY: Airway is open and patent, respirations are spontaneous, patient has a normal effort and rate, no accessory muscle use noted  CARDIAC: Patient has a normal rate and regular rhythm, no periphreal edema noted, capillary refill < 3 seconds.  ABDOMEN: Soft and non tender to palpation, no distention noted, normoactive bowel sounds present in all four quadrants.  NEUROLOGIC: PERRL, 3mm bilaterally, eyes open spontaneously, behavior appropriate to situation, follows commands, facial expression symmetrical, bilateral hand grasp equal and even, purposeful motor response noted, normal sensation in all extremities when touched with a finger.

## 2018-06-12 ENCOUNTER — TELEPHONE (OUTPATIENT)
Dept: OTOLARYNGOLOGY | Facility: CLINIC | Age: 78
End: 2018-06-12

## 2018-06-12 ENCOUNTER — TELEPHONE (OUTPATIENT)
Dept: INTERNAL MEDICINE | Facility: CLINIC | Age: 78
End: 2018-06-12

## 2018-06-12 ENCOUNTER — OFFICE VISIT (OUTPATIENT)
Dept: INTERNAL MEDICINE | Facility: CLINIC | Age: 78
End: 2018-06-12
Payer: COMMERCIAL

## 2018-06-12 ENCOUNTER — LAB VISIT (OUTPATIENT)
Dept: LAB | Facility: OTHER | Age: 78
End: 2018-06-12
Payer: COMMERCIAL

## 2018-06-12 DIAGNOSIS — R04.0 EPISTAXIS: Primary | ICD-10-CM

## 2018-06-12 DIAGNOSIS — R04.0 EPISTAXIS: ICD-10-CM

## 2018-06-12 LAB
BASOPHILS # BLD AUTO: 0.05 K/UL
BASOPHILS NFR BLD: 0.3 %
DIFFERENTIAL METHOD: ABNORMAL
EOSINOPHIL # BLD AUTO: 0 K/UL
EOSINOPHIL NFR BLD: 0.1 %
ERYTHROCYTE [DISTWIDTH] IN BLOOD BY AUTOMATED COUNT: 13.8 %
HCT VFR BLD AUTO: 23.9 %
HGB BLD-MCNC: 8.3 G/DL
LYMPHOCYTES # BLD AUTO: 3.1 K/UL
LYMPHOCYTES NFR BLD: 20.1 %
MCH RBC QN AUTO: 41.7 PG
MCHC RBC AUTO-ENTMCNC: 34.7 G/DL
MCV RBC AUTO: 120 FL
MONOCYTES # BLD AUTO: 1.8 K/UL
MONOCYTES NFR BLD: 11.4 %
NEUTROPHILS # BLD AUTO: 10.4 K/UL
NEUTROPHILS NFR BLD: 67.5 %
PLATELET # BLD AUTO: 295 K/UL
PMV BLD AUTO: 8.9 FL
RBC # BLD AUTO: 1.99 M/UL
WBC # BLD AUTO: 15.44 K/UL

## 2018-06-12 PROCEDURE — 36415 COLL VENOUS BLD VENIPUNCTURE: CPT

## 2018-06-12 PROCEDURE — 99999 PR PBB SHADOW E&M-EST. PATIENT-LVL V: CPT | Mod: PBBFAC,,, | Performed by: NURSE PRACTITIONER

## 2018-06-12 PROCEDURE — 85025 COMPLETE CBC W/AUTO DIFF WBC: CPT

## 2018-06-12 PROCEDURE — 3078F DIAST BP <80 MM HG: CPT | Mod: CPTII,S$GLB,, | Performed by: NURSE PRACTITIONER

## 2018-06-12 PROCEDURE — 99213 OFFICE O/P EST LOW 20 MIN: CPT | Mod: S$GLB,,, | Performed by: NURSE PRACTITIONER

## 2018-06-12 PROCEDURE — 3074F SYST BP LT 130 MM HG: CPT | Mod: CPTII,S$GLB,, | Performed by: NURSE PRACTITIONER

## 2018-06-12 NOTE — ED PROVIDER NOTES
Encounter Date: 6/11/2018    SCRIBE #1 NOTE: I, Alyssa Ny, am scribing for, and in the presence of,  Dr. Vargas. I have scribed the following portions of the note - Other sections scribed: HPI, ROS, PE.       History     Chief Complaint   Patient presents with    Epistaxis     Reccurent nosebleed for the past 4 days. Seen and d/c 4 times for it.      Time patient was seen by the provider: 8:51 PM      The patient is a 77 y.o. male with co-morbidities including: HTN, CAD, and prostate cancer who presents to the ED with a complaint of intermittent, right-sided epistaxis for four days. The patient was seen in the ED yesterday for the same sx, but refused nasal packing. Denies active bleeding.       The history is provided by the patient and medical records.     Review of patient's allergies indicates:  No Known Allergies  Past Medical History:   Diagnosis Date    Anemia     Anxiety     Coronary artery disease     Depression     HIV (human immunodeficiency virus infection)     Hypertension     Inflammatory bowel disease     colitis    Malignant neoplasm of colon, unspecified site     Colon cancer    Malignant neoplasm of colon, unspecified site     Colon cancer    Prostate cancer     STD (sexually transmitted disease)      Past Surgical History:   Procedure Laterality Date    cardiac stents      COLON SURGERY      NECK SURGERY      radiation       Family History   Problem Relation Age of Onset    Colon cancer Paternal Grandmother     Stomach cancer Neg Hx     Rectal cancer Neg Hx     Irritable bowel syndrome Neg Hx     Esophageal cancer Neg Hx     Crohn's disease Neg Hx     Celiac disease Neg Hx      Social History   Substance Use Topics    Smoking status: Former Smoker     Packs/day: 1.00     Years: 10.00     Types: Cigarettes    Smokeless tobacco: Never Used    Alcohol use Yes      Comment: occ drinks     Review of Systems   HENT: Positive for nosebleeds.    All other systems reviewed  and are negative.      Physical Exam     Initial Vitals [06/11/18 2028]   BP Pulse Resp Temp SpO2   (!) 150/59 104 18 98.5 °F (36.9 °C) 100 %      MAP       --         Physical Exam    Nursing note and vitals reviewed.  Constitutional: He appears well-developed and well-nourished. No distress.   Fussy. Dried blood on shirt.    HENT:   Head: Normocephalic and atraumatic.   Nose: No nasal septal hematoma.   Nose clear of clots.    Eyes: Conjunctivae and EOM are normal. Pupils are equal, round, and reactive to light.   Neck: Normal range of motion. Neck supple.   Cardiovascular: Normal rate, regular rhythm and normal heart sounds.   Pulmonary/Chest: Breath sounds normal. No respiratory distress.   Abdominal: Soft. Bowel sounds are normal. He exhibits no distension. There is no tenderness.   Musculoskeletal: Normal range of motion. He exhibits no edema.   Neurological: He is alert and oriented to person, place, and time. No sensory deficit.   Skin: Skin is warm and dry. Capillary refill takes less than 2 seconds.   Psychiatric: He has a normal mood and affect.         ED Course   Epistaxis Mgmt  Date/Time: 6/11/2018 9:44 PM  Performed by: RICHI JAMISON.  Authorized by: RICHI JAMISON.   Consent Done: Not Needed  Patient sedated: no  Treatment site: right anterior  Repair method: anterior pack and Rhino Rocket  Treatment complexity: simple  Recurrence: recurrence of recent bleed  Patient tolerance: Patient tolerated the procedure well with no immediate complications        Labs Reviewed   CBC W/ AUTO DIFFERENTIAL - Abnormal; Notable for the following:        Result Value    WBC 16.40 (*)     RBC 1.99 (*)     Hemoglobin 8.7 (*)     Hematocrit 25.5 (*)      (*)     MCH 43.7 (*)     Immature Granulocytes 0.8 (*)     Gran # (ANC) 11.0 (*)     Immature Grans (Abs) 0.13 (*)     Mono # 1.9 (*)     All other components within normal limits          No orders to display        Medical Decision Making:    History:   Old Medical Records: I decided to obtain old medical records.  Clinical Tests:   Lab Tests: Ordered and Reviewed  ED Management:  Noted decline in hgb. Pt had refused in past. Agreed to packing now.             Scribe Attestation:   Scribe #1: I performed the above scribed service and the documentation accurately describes the services I performed. I attest to the accuracy of the note.               Clinical Impression:   The encounter diagnosis was Epistaxis.                             Travis Vargas MD  06/11/18 8481

## 2018-06-12 NOTE — ED TRIAGE NOTES
Pt here for recurrent nosebleeds, was seen here yesterday for same problem. Refused to have nose packed. Reports intermittent bleeding to R nare

## 2018-06-12 NOTE — PROGRESS NOTES
Subjective:       Patient ID: Aroldo Johnston Jr. is a 77 y.o. male.    Chief Complaint: Epistaxis    HPI   Very pleasant 78 yo male, accompanied by his sister, presents to clinic for nosebleed that began  Thursday night 5 days ago). They have been to the ER several times for the nosebleed and a nasal packing balloon was placed to the left nare yesterday night in the ER.  Currently, nasal packing is still in place and minimal bleeding is noted to left nare.  He complains of fatigue but denies other symptoms.  His other sister was to schedule a follow up with ENT to remove packing but no appointment has been made yet.  They are here because they are concerned about the continued bleeding and unsure what to do next.     Review of Systems   Constitutional: Positive for fatigue. Negative for chills, diaphoresis and fever.   HENT: Positive for nosebleeds.    Respiratory: Negative for cough, chest tightness, shortness of breath and wheezing.    Cardiovascular: Negative for chest pain, palpitations and leg swelling.   Gastrointestinal: Negative for abdominal pain, nausea and vomiting.   Neurological: Positive for weakness. Negative for dizziness, syncope, light-headedness, numbness and headaches.         Objective:      Physical Exam   Constitutional: He is oriented to person, place, and time. He appears well-developed and well-nourished.   HENT:   Head: Normocephalic.   Right Ear: External ear normal.   Left Ear: External ear normal.   Mouth/Throat: Oropharynx is clear and moist.   Eyes: Pupils are equal, round, and reactive to light.   Pale mucous membranes   Cardiovascular: Normal rate, regular rhythm, normal heart sounds and intact distal pulses.    Pulmonary/Chest: Effort normal and breath sounds normal.   Abdominal: Soft. Bowel sounds are normal.   Musculoskeletal: Normal range of motion.   Neurological: He is alert and oriented to person, place, and time.   Skin: Skin is warm and dry. Capillary refill takes less  than 2 seconds. No pallor.   Psychiatric: He has a normal mood and affect. His behavior is normal. Judgment and thought content normal.   Vitals reviewed.      Assessment:       1. Epistaxis        Plan:       Aroldo was seen today for epistaxis.    Diagnoses and all orders for this visit:    Epistaxis  -     CBC auto differential; Future  -     Ambulatory consult to ENT- urgent.  Appointment scheduled for Thursday, 6/14/2018 with Dr. Marcos.  Patient and sister notified.  -     Leave packing in nose until follow up appointment with ENT.  -     If symptoms do not improve or worsen in the next 1-3 days or if you develop concerning symptoms like shortness of breath, dizziness, chest pain, and worsened bleeding, please go to Emergency Department.

## 2018-06-12 NOTE — PROVIDER PROGRESS NOTES - EMERGENCY DEPT.
Encounter Date: 6/11/2018    ED Physician Progress Notes        Physician Note:   Patient was seen and examined by the nurse practitioner during the day.  After patient refused packing ENT was consulted for admission and felt the patient was not appropriate for admission.  I spoke to Hospital Medicine in regards to the case and ask if patient could be admitted for observation to trend hemoglobin, however they refused admission due to the fact that patient was refusing treatment.  I spoke with the patient and his family member in great detail about the fact that ENT and Hospital Medicine do not feel it is appropriate to admit him to the hospital given that he is refusing packing treatment.  Family member states that not admitting the patient to the hospital would not hold up in a court of law.  After a long discussion patient was discharged home.  Patient told to return if symptoms worsen.  Patient was told to call ENT and make an appointment with them in the morning.  Patient and family member expressed understanding.

## 2018-06-12 NOTE — ED NOTES
Patient Identifiers for Aroldo Johnston Jr. checked and correct  LOC: The patient is awake, alert and aware of environment with an appropriate affect, the patient is oriented x 3 and speaking appropriate.  APPEARANCE: Patient resting comfortably and in no acute distress, patient is clean and well groomed, patient's clothing is properly fastened.   SKIN: The skin is warm and dry, patient has normal skin turgor and moist mucus membranes,no rashes or lesions.Skin Intact , No Breakdown Noted  Musculoskeletal :  Normal range of motion noted. Moves all extremeties well, No swelling or tenderness noted  RESPIRATORY: Airway is open and patent, respirations are spontaneous, patient has a normal effort and rate.  CARDIAC: Patient has a normal rate and rhythm, no periphreal edema noted, capillary refill < 3 seconds.   ABDOMEN: Soft and non tender to palpation, no distention noted.   PULSES: 2+  And symmetrical in all extremeties  NEUROLOGIC: PERRL,facial expression is symmetrical, patient moving all extremities, normal sensation in all extremities when touched with a finger.The patient is awake, alert and cooperative with a calm affect, patient is aware of environment.    Will continue to monitor

## 2018-06-12 NOTE — TELEPHONE ENCOUNTER
We have a pt who came in the office today for a nose bleed. Pt has nasal packing that was put in on 6/11/18 and was advised by ER to have this removed on 6/14. Can your staff assist in getting this pt in to have the done?

## 2018-06-13 VITALS
BODY MASS INDEX: 22.69 KG/M2 | RESPIRATION RATE: 20 BRPM | WEIGHT: 128.06 LBS | HEART RATE: 96 BPM | SYSTOLIC BLOOD PRESSURE: 108 MMHG | HEIGHT: 63 IN | OXYGEN SATURATION: 96 % | DIASTOLIC BLOOD PRESSURE: 62 MMHG

## 2018-06-13 NOTE — PATIENT INSTRUCTIONS
Nosebleed  The skin inside your nose is fragile and filled with blood vessels. That's why even a slight injury to your nose sometimes may cause bleeding. Hard nose blowing, dry winter air, colds, and nose-picking can also cause nosebleeds. Medicines such as warfarin, aspirin, and other blood thinners can make it more likely to have a nosebleed that is difficult to stop. Normally, nosebleeds aren't a cause for concern. But in some cases, they can mean that you have a more serious health problem. Know when to seek medical care for a nosebleed.  When to go to the emergency room (ER)  Most nosebleeds arent a medical emergency. In fact, you often can treat them yourself. But see your healthcare provider if you have nosebleeds often. And seek care right away if you:  · Have a head injury  · Have bleeding that lasts more than 15 to 30 minutes or is severe  · Feel weak or faint  · Have trouble breathing  What to expect in the ER  · You will be examined and may have blood tests.  · You may be given medicated nose drops to stop the nosebleed.  · The doctor may pack gauze into your nose to put pressure on the vessel and help stop bleeding.  · The bleeding vessel may be cauterized. During this procedure, the vessel is burned with an electrical device or chemical. Your nose is first numbed so you wont feel any pain.  · In rare cases, you may need surgery to control the bleeding.  Home care for a nosebleed  · Don't blow your nose for 12 hours after the bleeding stops. This will allow a strong blood clot to form. Don't pick your nose. This may restart bleeding.  · Don't drink alcohol or hot liquids for the next 2 days. Alcohol and hot liquids can dilate blood vessels in your nose. This can cause bleeding to start again.  · Don't take ibuprofen, naproxen, or medicines that contain aspirin. These thin the blood and may cause your nose to bleed. You may take acetaminophen for pain, unless another pain medicine was  prescribed.  · If the bleeding starts again, sit up and lean forward to prevent swallowing blood. Pinch your nose tightly on both sides for 10 to 15 minutes. Time yourself. Dont release the pressure on your nose until 10 minutes is up. If bleeding doesn't stop, continue to pinch your nose. Call your healthcare provider.  · If you have a cold, allergies, or dry nasal membranes, lubricate the nasal passages. Apply a small amount of petroleum jelly inside the nose with a cotton swab twice a day (morning and night).  · Don't overheat your home. This can dry the air and make your condition worse.  · Put a humidifier in the room where you sleep. This will add moisture to the air.  · Use a saline nasal spray to keep nasal passages moist.  · Don't pick your nose. Keep fingernails trimmed to decrease risk of bleeds.  · Don't smoke.  · Follow all other home care instructions from your healthcare provider.  · Call your healthcare provider if you have any questions or concerns.  Date Last Reviewed: 10/1/2016  © 8229-8382 [employer]. [address] [place] [address]. All rights reserved. This information is not intended as a substitute for professional medical care. Always follow your healthcare professional's instructions.

## 2018-06-15 ENCOUNTER — HOSPITAL ENCOUNTER (EMERGENCY)
Facility: HOSPITAL | Age: 78
Discharge: HOME | End: 2018-06-15
Attending: FAMILY MEDICINE
Payer: COMMERCIAL

## 2018-06-15 VITALS
BODY MASS INDEX: 22.86 KG/M2 | OXYGEN SATURATION: 97 % | TEMPERATURE: 98 F | HEART RATE: 100 BPM | SYSTOLIC BLOOD PRESSURE: 135 MMHG | RESPIRATION RATE: 18 BRPM | HEIGHT: 63 IN | WEIGHT: 129 LBS | DIASTOLIC BLOOD PRESSURE: 60 MMHG

## 2018-06-15 DIAGNOSIS — D64.9 ANEMIA, UNSPECIFIED TYPE: ICD-10-CM

## 2018-06-15 DIAGNOSIS — Z48.00 ENCOUNTER FOR REMOVAL OF NASAL PACKING: Primary | ICD-10-CM

## 2018-06-15 LAB
BUN SERPL-MCNC: 18 MG/DL (ref 6–30)
CHLORIDE SERPL-SCNC: 98 MMOL/L (ref 95–110)
CREAT SERPL-MCNC: 1.4 MG/DL (ref 0.5–1.4)
GLUCOSE SERPL-MCNC: 144 MG/DL (ref 70–110)
HCT VFR BLD CALC: 22 %PCV (ref 36–54)
POC IONIZED CALCIUM: 1.12 MMOL/L (ref 1.06–1.42)
POC TCO2 (MEASURED): 23 MMOL/L (ref 23–29)
POTASSIUM BLD-SCNC: 3.8 MMOL/L (ref 3.5–5.1)
SAMPLE: ABNORMAL
SODIUM BLD-SCNC: 135 MMOL/L (ref 136–145)

## 2018-06-15 PROCEDURE — 99283 EMERGENCY DEPT VISIT LOW MDM: CPT

## 2018-06-15 PROCEDURE — 99282 EMERGENCY DEPT VISIT SF MDM: CPT | Mod: ,,, | Performed by: PHYSICIAN ASSISTANT

## 2018-06-15 NOTE — ED TRIAGE NOTES
Presents to ER to have balloon removed from his right nare.  Patient reports having black stools since his nose bleed.  Patient's name and date of birth checked and is correct.  LOC: The patient is awake, alert and aware of environment with an appropriate affect, the patient is oriented x 3 and speaking appropriately.  APPEARANCE: Patient resting comfortably and in no acute distress, patient is clean and well groomed, patient's clothing is properly fastened.  CARDIOVASCULAR:  Heart rate regular and even with no peripheral edema noted.  SKIN: The skin is warm and dry, patient has normal skin turgor and moist mucus membranes, skin intact, no breakdown or brusing noted.   MUSKULOSKELETAL: Patient moving all extremities well, no obvious swelling or deformities noted.  RESPIRATORY: Airway is open and patent, respirations are spontaneous, patient has a normal effort and rate.

## 2018-06-15 NOTE — ED PROVIDER NOTES
Encounter Date: 6/15/2018       History     Chief Complaint   Patient presents with    Nose Problem     wanting to get balloon out of r nare,      77-year-old male with HIV on HAART, hypertension, UC, history of colon cancer, CAD presents for removal of nasal packing.  The patient presented to the ED 6/8 for epistaxis, seen by ENT but initially refused nasal packing.  Patient had several other ED visits in the next few days, leaving [facility]MA twice.  On 6/11 patient consented to have nasal packing placed and had right-sided rhino rocket placed.  Today he is returning to the ED to have it removed.  Presently he complains of sensation of nasal congestion and is also reporting dark stools for several days.  Denies abdominal pain, nausea/vomiting, diarrhea, constipation.  Denies chest pain, shortness of breath, pallor, lightheadedness.          Review of patient's allergies indicates:  No Known Allergies  Past Medical History:   Diagnosis Date    Anemia     Anxiety     Coronary artery disease     Depression     HIV (human immunodeficiency virus infection)     Hypertension     Inflammatory bowel disease     colitis    Malignant neoplasm of colon, unspecified site     Colon cancer    Malignant neoplasm of colon, unspecified site     Colon cancer    Prostate cancer     STD (sexually transmitted disease)      Past Surgical History:   Procedure Laterality Date    cardiac stents      COLON SURGERY      NECK SURGERY      radiation       Social History   Substance Use Topics    Smoking status: Former Smoker     Packs/day: 1.00     Years: 10.00     Types: Cigarettes    Smokeless tobacco: Never Used    Alcohol use Yes      Comment: occ drinks     Review of Systems   Constitutional: Negative for fatigue and fever.   HENT: Positive for nosebleeds. Negative for sinus pain and sinus pressure.    Respiratory: Negative for cough and shortness of breath.    Cardiovascular: Negative for chest pain and palpitations.    Gastrointestinal: Positive for blood in stool. Negative for abdominal pain, anal bleeding, constipation, diarrhea, nausea, rectal pain and vomiting.   Genitourinary: Negative for difficulty urinating, dysuria, flank pain, frequency and hematuria.   Musculoskeletal: Negative for back pain.   Skin: Negative for color change.   Neurological: Negative for light-headedness and numbness.   Hematological: Does not bruise/bleed easily.       Physical Exam     Initial Vitals [06/15/18 1618]   BP Pulse Resp Temp SpO2   135/60 100 18 97.8 °F (36.6 °C) 97 %      MAP       --         Physical Exam    Nursing note and vitals reviewed.  Constitutional: He appears well-developed and well-nourished. He is not diaphoretic. No distress.   Family at bedside   HENT:   Head: Normocephalic and atraumatic.   Nose: No mucosal edema, rhinorrhea, nose lacerations, sinus tenderness, nasal deformity or septal deviation. No epistaxis. Foreign body is present.   Patient has cut the end of his rhino rocket. No active bleeding.   Eyes: EOM are normal. Pupils are equal, round, and reactive to light.   Neck: Normal range of motion.   Cardiovascular: Normal rate, regular rhythm and normal heart sounds. Exam reveals no gallop and no friction rub.    No murmur heard.  Pulmonary/Chest: Breath sounds normal. No respiratory distress. He has no wheezes. He has no rhonchi. He has no rales. He exhibits no tenderness.   Abdominal: Soft. Bowel sounds are normal. He exhibits no distension. There is no tenderness. There is no rebound and no guarding.   Musculoskeletal: Normal range of motion. He exhibits no edema or tenderness.   Neurological: He is alert and oriented to person, place, and time.   Skin: Skin is warm and dry. No pallor.   Psychiatric: He has a normal mood and affect.         ED Course   Procedures  Labs Reviewed   ISTAT CHEM8          No orders to display        Medical Decision Making:   History:   Old Medical Records: I decided to obtain old  medical records.  Clinical Tests:   Lab Tests: Ordered and Reviewed       APC / Resident Notes:   77-year-old male presenting for removal of nasal packing.  Patient seems to have cut the end of his rhino rocket, packing removed without difficulty.  No active bleeding, no dried blood in nares, on face or clothing.  Patient deferred rectal exam at this time, I suspect that dark stool is due to swallowing blood from epistaxis.  The him concerned about level of blood lost, the patient's hemoglobin 1 week ago was 13.6 which has declined to 8.3 at last reading on 6/12.  Patient denies lightheadedness, chest pain, shortness of breath or pallor.  Will recheck hematocrit and reassess.    Repeat hematocrit today decreased to 22.  Discussed this result with patient, he is opposed to receiving any kind of blood transfusion.  Given that the patient is asymptomatic and not actively bleeding, I believe it is safe to discharge him with PCP follow up.  Instructed patient to have hemoglobin recheck in 1 week to assure normalization.  Advised patient to try iron supplementation.  Strict ED return precautions given, specifically related to symptomatic anemia and possibility of transfusion.  Patient and family voiced understanding and are comfortable with discharge. I discussed this patient with my supervising physician.     Marely Sims PA-C                   Clinical Impression:   The primary encounter diagnosis was Encounter for removal of nasal packing. A diagnosis of Anemia, unspecified type was also pertinent to this visit.      Disposition:   Disposition: Discharged  Condition: Stable                        Marely Sims PA-C  06/15/18 4200

## 2018-06-15 NOTE — DISCHARGE INSTRUCTIONS
Please schedule an appointment with your Primary Care Doctor in the next week or so to have your blood counts re-checked. Your hemoglobin today was 7.3, which is below the normal of 14-18. You may want to start an iron supplement to help your body re-build the red blood cells that you lost from your nose bleed. If you start to feel weak, short of breath, have chest pain, notice your skin getting pale or start bleeding again, please return to the emergency room as these could be signs of dangerous blood loss.

## 2018-06-18 ENCOUNTER — TELEPHONE (OUTPATIENT)
Dept: INTERNAL MEDICINE | Facility: CLINIC | Age: 78
End: 2018-06-18

## 2018-06-18 DIAGNOSIS — E53.8 B12 DEFICIENCY: ICD-10-CM

## 2018-06-18 DIAGNOSIS — D64.9 ANEMIA, UNSPECIFIED TYPE: Primary | ICD-10-CM

## 2018-06-18 NOTE — TELEPHONE ENCOUNTER
----- Message from Starla Martinez sent at 6/18/2018 11:25 AM CDT -----            Name of Who is Calling: pt sister isael    What is the request in detail: pt was in the ED on Friday and they were told to call dr office to have some labs drawn.    Can the clinic reply by [facility]: no    What Number to Call Back if not in [facility]: [phone]

## 2018-06-18 NOTE — TELEPHONE ENCOUNTER
Pt's sister isael has scheduled labs for pt. Pt's sister demonstrated verbal understanding of information and had no further questions or concerns at this time.

## 2018-06-18 NOTE — TELEPHONE ENCOUNTER
Pt called stating ED informed him to contact our office for labs that were recommended to him to have drawn.     Per ED note from 6/15/18    the patient's hemoglobin 1 week ago was 13.6 which has declined to 8.3 at last reading on 6/12.  Patient denies lightheadedness, chest pain, shortness of breath or pallor.  Will recheck hematocrit and reassess.     Repeat hematocrit today decreased to 22.  Discussed this result with patient, he is opposed to receiving any kind of blood transfusion.  Given that the patient is asymptomatic and not actively bleeding, I believe it is safe to discharge him with PCP follow up.  Instructed patient to have hemoglobin recheck in 1 week to assure normalization. Advised patient to try iron supplementation.    Order for CBC, CMP, and ferritin pended. Please advise and auth if appropriate.

## 2018-06-22 ENCOUNTER — LAB VISIT (OUTPATIENT)
Dept: LAB | Facility: OTHER | Age: 78
End: 2018-06-22
Attending: INTERNAL MEDICINE
Payer: COMMERCIAL

## 2018-06-22 DIAGNOSIS — E53.8 B12 DEFICIENCY: ICD-10-CM

## 2018-06-22 DIAGNOSIS — D64.9 ANEMIA, UNSPECIFIED TYPE: ICD-10-CM

## 2018-06-22 LAB
BASOPHILS # BLD AUTO: 0.06 K/UL
BASOPHILS NFR BLD: 0.7 %
DIFFERENTIAL METHOD: ABNORMAL
EOSINOPHIL # BLD AUTO: 0.2 K/UL
EOSINOPHIL NFR BLD: 2.6 %
ERYTHROCYTE [DISTWIDTH] IN BLOOD BY AUTOMATED COUNT: 15 %
FERRITIN SERPL-MCNC: 16 NG/ML
HCT VFR BLD AUTO: 25.4 %
HGB BLD-MCNC: 8.3 G/DL
LYMPHOCYTES # BLD AUTO: 2.5 K/UL
LYMPHOCYTES NFR BLD: 30.3 %
MCH RBC QN AUTO: 37.6 PG
MCHC RBC AUTO-ENTMCNC: 32.7 G/DL
MCV RBC AUTO: 115 FL
MONOCYTES # BLD AUTO: 0.8 K/UL
MONOCYTES NFR BLD: 9.4 %
NEUTROPHILS # BLD AUTO: 4.6 K/UL
NEUTROPHILS NFR BLD: 56.5 %
PLATELET # BLD AUTO: 506 K/UL
PMV BLD AUTO: 8 FL
RBC # BLD AUTO: 2.21 M/UL
VIT B12 SERPL-MCNC: 404 PG/ML
WBC # BLD AUTO: 8.08 K/UL

## 2018-06-22 PROCEDURE — 82728 ASSAY OF FERRITIN: CPT

## 2018-06-22 PROCEDURE — 36415 COLL VENOUS BLD VENIPUNCTURE: CPT

## 2018-06-22 PROCEDURE — 85025 COMPLETE CBC W/AUTO DIFF WBC: CPT

## 2018-06-22 PROCEDURE — 82607 VITAMIN B-12: CPT

## 2018-06-25 ENCOUNTER — LAB VISIT (OUTPATIENT)
Dept: LAB | Facility: OTHER | Age: 78
End: 2018-06-25
Attending: INTERNAL MEDICINE
Payer: COMMERCIAL

## 2018-06-25 ENCOUNTER — OFFICE VISIT (OUTPATIENT)
Dept: INTERNAL MEDICINE | Facility: CLINIC | Age: 78
End: 2018-06-25
Attending: INTERNAL MEDICINE
Payer: COMMERCIAL

## 2018-06-25 VITALS
HEIGHT: 63 IN | BODY MASS INDEX: 23.25 KG/M2 | WEIGHT: 131.19 LBS | DIASTOLIC BLOOD PRESSURE: 60 MMHG | SYSTOLIC BLOOD PRESSURE: 130 MMHG | HEART RATE: 79 BPM

## 2018-06-25 DIAGNOSIS — C61 PROSTATE CANCER: ICD-10-CM

## 2018-06-25 DIAGNOSIS — R41.3 MEMORY LOSS: Primary | ICD-10-CM

## 2018-06-25 DIAGNOSIS — B20 HIV (HUMAN IMMUNODEFICIENCY VIRUS INFECTION): ICD-10-CM

## 2018-06-25 DIAGNOSIS — R41.3 MEMORY LOSS: ICD-10-CM

## 2018-06-25 LAB
ALBUMIN SERPL BCP-MCNC: 3.4 G/DL
ALP SERPL-CCNC: 71 U/L
ALT SERPL W/O P-5'-P-CCNC: 31 U/L
ANION GAP SERPL CALC-SCNC: 8 MMOL/L
AST SERPL-CCNC: 30 U/L
BILIRUB SERPL-MCNC: 1.2 MG/DL
BUN SERPL-MCNC: 9 MG/DL
CALCIUM SERPL-MCNC: 9.2 MG/DL
CHLORIDE SERPL-SCNC: 108 MMOL/L
CO2 SERPL-SCNC: 23 MMOL/L
COMPLEXED PSA SERPL-MCNC: 6.5 NG/ML
CREAT SERPL-MCNC: 1.1 MG/DL
EST. GFR  (AFRICAN AMERICAN): >60 ML/MIN/1.73 M^2
EST. GFR  (NON AFRICAN AMERICAN): >60 ML/MIN/1.73 M^2
GLUCOSE SERPL-MCNC: 104 MG/DL
POTASSIUM SERPL-SCNC: 4.4 MMOL/L
PROT SERPL-MCNC: 7.2 G/DL
SODIUM SERPL-SCNC: 139 MMOL/L
TSH SERPL DL<=0.005 MIU/L-ACNC: 2.81 UIU/ML

## 2018-06-25 PROCEDURE — 86361 T CELL ABSOLUTE COUNT: CPT

## 2018-06-25 PROCEDURE — 84443 ASSAY THYROID STIM HORMONE: CPT

## 2018-06-25 PROCEDURE — 84154 ASSAY OF PSA FREE: CPT

## 2018-06-25 PROCEDURE — 3075F SYST BP GE 130 - 139MM HG: CPT | Mod: CPTII,S$GLB,, | Performed by: INTERNAL MEDICINE

## 2018-06-25 PROCEDURE — 86592 SYPHILIS TEST NON-TREP QUAL: CPT

## 2018-06-25 PROCEDURE — 99214 OFFICE O/P EST MOD 30 MIN: CPT | Mod: S$GLB,,, | Performed by: INTERNAL MEDICINE

## 2018-06-25 PROCEDURE — 84153 ASSAY OF PSA TOTAL: CPT

## 2018-06-25 PROCEDURE — 99999 PR PBB SHADOW E&M-EST. PATIENT-LVL III: CPT | Mod: PBBFAC,,, | Performed by: INTERNAL MEDICINE

## 2018-06-25 PROCEDURE — 36415 COLL VENOUS BLD VENIPUNCTURE: CPT

## 2018-06-25 PROCEDURE — 80053 COMPREHEN METABOLIC PANEL: CPT

## 2018-06-25 PROCEDURE — 3078F DIAST BP <80 MM HG: CPT | Mod: CPTII,S$GLB,, | Performed by: INTERNAL MEDICINE

## 2018-06-25 NOTE — PROGRESS NOTES
"Subjective:       Patient ID: Aroldo Johnston Jr. is a 77 y.o. male.    Chief Complaint: Follow-up    Here for ED f/u    Seen in ED several times (6-7 visits, AMA'd a few times) in past 2 weeks for recurrent epistaxis. No longer having bleeding. Denies preceding URI symptoms, Rhinorrhea, excessive nose picking, sneezing, hematuria, melena, BRBPR despite Hx of Crohn's.  Not taking B12.  Not  taking iron. His sister who was took him to ED several times and is also a patient of mine is present today. When questioned about recent nosebleed patient does not recall this. He does not recall going to the ED once. No new tremors, focal weakness, numbness/tingling, falls, dizziness, vertigo, myalgias, arthralgias, night sweats, f/c, hemoptysis. Further discussion reveals he may or may not be following up with his ID MD. States he is adherent with meds but can not tell me who is filling them or when he last saw this MD ([facility] [address]).        Review of Systems   Constitutional: Negative for activity change.   Eyes: Negative for discharge.   Respiratory: Negative for wheezing.    Cardiovascular: Negative for chest pain and palpitations.   Gastrointestinal: Positive for diarrhea. Negative for constipation and vomiting.   Endocrine: Positive for polyuria.   Genitourinary: Negative for difficulty urinating and hematuria.   Neurological: Positive for weakness. Negative for headaches.   Psychiatric/Behavioral: Positive for confusion and dysphoric mood.       Objective:      Vitals:    06/25/18 1516   BP: 130/60   Pulse: 79   Weight: 59.5 kg (131 lb 2.8 oz)   Height: 5' 3" (1.6 m)      Physical Exam   Constitutional: He is oriented to person, place, and time. He appears well-developed and well-nourished. No distress.   HENT:   Head: Normocephalic and atraumatic.   Mouth/Throat: Oropharynx is clear and moist. No oropharyngeal exudate.   Eyes: Conjunctivae and EOM are normal. Pupils are equal, round, and reactive to light. No " scleral icterus.   Neck: No thyromegaly present.   Cardiovascular: Normal rate, regular rhythm and normal heart sounds.    No murmur heard.  Pulmonary/Chest: Effort normal and breath sounds normal. He has no wheezes. He has no rales.   Abdominal: Soft. He exhibits no distension. There is no tenderness.   Musculoskeletal: He exhibits no edema or tenderness.   Lymphadenopathy:     He has no cervical adenopathy.   Neurological: He is alert and oriented to person, place, and time.   Skin: Skin is warm and dry.   Psychiatric: He has a normal mood and affect. His behavior is normal.       Assessment:       1. Memory loss    2. HIV (human immunodeficiency virus infection)    3. Prostate cancer        Plan:       Aroldo was seen today for follow-up.    Diagnoses and all orders for this visit:    Now that his sister is on board we will have much better odds of patient following up with appropriate specialist he is already established with :  [facility]  for recurrent prostate CA and [facility] for memory as well as [facility] for GI but not as much acute with G at this time. Also needs to see Shannon. Need to check with their office on visit adherence.     Memory loss  -     TSH; Future  -     RPR; Future  -     Comprehensive metabolic panel; Future  -     Ambulatory Referral to Neurology    HIV (human immunodeficiency virus infection)  -update labs to see if we need to go ahead and image for worsening memory now for toxo/lymphoma or can defer imaging to neuro at time of memory f/u  -     T-HELPER CELLS (CD4) COUNT; Future  -     TSH; Future  -     RPR; Future    Prostate cancer  -     PSA, TOTAL AND FREE; Future  -     PROSTATE SPECIFIC ANTIGEN, DIAGNOSTIC; Future       RTC after seeing al specialist. Sister Caroline has been given permission by patient to get proxy to his [employer] account. This will help.        Side effects of medication(s) were discussed in detail and patient voiced understanding.  Patient will call  back for any issues or complications.

## 2018-06-26 LAB
CD3+CD4+ CELLS # BLD: 875 CELLS/UL (ref 300–1400)
CD3+CD4+ CELLS NFR BLD: 36 % (ref 28–57)
RPR SER QL: NORMAL

## 2018-06-27 ENCOUNTER — PATIENT MESSAGE (OUTPATIENT)
Dept: GASTROENTEROLOGY | Facility: CLINIC | Age: 78
End: 2018-06-27

## 2018-06-27 LAB
PROSTATE SPECIFIC ANTIGEN, TOTAL: 6.3 NG/ML
PSA FREE MFR SERPL: 4.6 %
PSA FREE SERPL-MCNC: 0.29 NG/ML

## 2018-06-28 ENCOUNTER — PATIENT MESSAGE (OUTPATIENT)
Dept: GASTROENTEROLOGY | Facility: CLINIC | Age: 78
End: 2018-06-28

## 2018-06-30 ENCOUNTER — TELEPHONE (OUTPATIENT)
Dept: INTERNAL MEDICINE | Facility: CLINIC | Age: 78
End: 2018-06-30

## 2018-06-30 NOTE — TELEPHONE ENCOUNTER
Notified pt of test results. He ask that I give him results and get his permission before then contacting his sister. Pt with Hx of worsening dementia. Discussed results. Contacted sister Caroline, who is also a pt of mine, and updaytedresults on voicemail. She has recently been assigned proxy user for him and will also communicate with her via this.

## 2018-07-03 ENCOUNTER — PATIENT MESSAGE (OUTPATIENT)
Dept: INTERNAL MEDICINE | Facility: CLINIC | Age: 78
End: 2018-07-03

## 2018-07-09 ENCOUNTER — OFFICE VISIT (OUTPATIENT)
Dept: UROLOGY | Facility: CLINIC | Age: 78
End: 2018-07-09
Attending: UROLOGY
Payer: COMMERCIAL

## 2018-07-09 VITALS
DIASTOLIC BLOOD PRESSURE: 62 MMHG | BODY MASS INDEX: 23.93 KG/M2 | SYSTOLIC BLOOD PRESSURE: 115 MMHG | WEIGHT: 135.06 LBS | HEIGHT: 63 IN | HEART RATE: 77 BPM

## 2018-07-09 DIAGNOSIS — C61 PROSTATE CANCER: Primary | ICD-10-CM

## 2018-07-09 PROCEDURE — 3078F DIAST BP <80 MM HG: CPT | Mod: CPTII,S$GLB,, | Performed by: UROLOGY

## 2018-07-09 PROCEDURE — 3074F SYST BP LT 130 MM HG: CPT | Mod: CPTII,S$GLB,, | Performed by: UROLOGY

## 2018-07-09 PROCEDURE — 99214 OFFICE O/P EST MOD 30 MIN: CPT | Mod: S$GLB,,, | Performed by: UROLOGY

## 2018-07-09 NOTE — PROGRESS NOTES
"Subjective:      Aroldo Johnston Jr. is a 77 y.o. male who returns today regarding his prostate cancer, UPJ obstruction, balanitis, and stones.    Prior  history as per below.    Doing well today with no c/o. Balanitis resolved.    Prostate Cancer  He states that he was diagnosed with prostate cancer in 2004 at [facility]; pT1c, [place] 3+3=6, PSA 16.5.  He states he was treated with radiation therapy with no known recurrence.  PSA sachin 0.97 in March 2009.  PSA slowly began to rise in 2011 and then more rapidly to 4.9 in Aug 2015, when he was referred to me.  Began ADT in 2016 and later opted for intermittent therapy. Last injection 3/3/16. PSA has remained low since.    UPJ Obstruction  He was diagnosed w/ left UPJ obstruction in 2014 after presenting with large renal stone.  He had robotic pyeloplasty in August 2014.  He did not FU for stent removal, which was noted on CT in September 2015.  Stent exchanged in OR on 9/24/15 and subsequently removed in clinic.  FU US demonstrated stable, likely chronic hydronephrosis.    The following portions of the patient's history were reviewed and updated as appropriate: allergies, current medications, past family history, past medical history, past social history, past surgical history and problem list.    Review of Systems  A comprehensive multipoint review of systems was negative except as otherwise stated in the HPI.     Objective:   Vitals:   /62 (BP Location: Right arm, Patient Position: Sitting, BP Method: Medium (Automatic))   Pulse 77   Ht 5' 3" (1.6 m)   Wt 61.3 kg (135 lb 0.5 oz)   BMI 23.92 kg/m²     Physical Exam   General: alert and oriented, no acute distress  Respiratory: Symmetric expansion, non-labored breathing  Neuro: no gross deficits; poor memory  Psych: normal judgment and insight, normal mood/affect and non-anxious    Lab Review   Component PSA DIAGNOSTIC   Latest Ref Rng & Units 0.00 - 4.00 ng/mL   6/25/2018 6.5 (H)   3/22/2018 6.4 (H) "   9/18/2018 2.8   3/20/2017 0.86   9/16/2016 0.03   7/1/2016 0.05   3/29/2016 0.31   1/6/2016 8.0 (H)       Assessment and Plan:   Balanitis  -- Resolved    Prostate cancer  Elevated PSA  -- PSA rise noted  -- Intermittent ADT as indicated - plan for PSA > 10    Hydronephrosis, left  -- Stable s/p left pyeloplasty    FU 6 mos w/ PSA

## 2018-07-10 ENCOUNTER — PATIENT MESSAGE (OUTPATIENT)
Dept: INTERNAL MEDICINE | Facility: CLINIC | Age: 78
End: 2018-07-10

## 2018-07-10 ENCOUNTER — PATIENT MESSAGE (OUTPATIENT)
Dept: GASTROENTEROLOGY | Facility: CLINIC | Age: 78
End: 2018-07-10

## 2018-07-10 DIAGNOSIS — B20 HIV (HUMAN IMMUNODEFICIENCY VIRUS INFECTION): Primary | ICD-10-CM

## 2018-07-11 ENCOUNTER — PATIENT MESSAGE (OUTPATIENT)
Dept: INTERNAL MEDICINE | Facility: CLINIC | Age: 78
End: 2018-07-11

## 2018-07-20 ENCOUNTER — TELEPHONE (OUTPATIENT)
Dept: INTERNAL MEDICINE | Facility: CLINIC | Age: 78
End: 2018-07-20

## 2018-07-24 ENCOUNTER — TELEPHONE (OUTPATIENT)
Dept: INTERNAL MEDICINE | Facility: CLINIC | Age: 78
End: 2018-07-24

## 2018-07-24 NOTE — TELEPHONE ENCOUNTER
----- Message from Loy Tolliver MD sent at 7/24/2018 12:20 PM CDT -----  Unfortunately, this patient was discharged not once but twice and we will be unable to see him here again. I believe there are other ID providers at , [facility], [facility], and [facility], if he requires ID care. Thanks for your understanding, CB    ----- Message -----  From: Kiley Dutton MA  Sent: 7/24/2018  10:43 AM  To: Loy Tolliver MD    July 17, 2018    Aroldo Johnston Jr.  [address] [address]        [facility]  [address] [place] [id number]  Phone: [phone]  Fax: [phone] July 17, 2018       Patient: Aroldo Johnston Jr.  YOB: 1940  Date of Visit: 7/17/2018      To Dr. Loy Tolliver:    I am writing this letter on behalf of my patient Aroldo Johnston and his sister Daniela Johnston. Aroldo was previously dismissed from infectious disease [facility] at least prior to 2013. While I do not have all details from event I have cared from Mr. Johnston since 8/2015 and during this time he has not exhibited any aggressive or combative behavior. I have also witnessed his progression of memory loss over time that has come to the point where his sister will now be taking oversight of his care and accompanying him to all his appointments. It is their request to be allowed to receive care at [facility]. If you have any questions or concerns please do not hesitate to notify me via [facility] messaging or by calling my cell at [phone].    Sincerely,        Cory Gardner MD

## 2018-08-06 ENCOUNTER — TELEPHONE (OUTPATIENT)
Dept: INTERNAL MEDICINE | Facility: CLINIC | Age: 78
End: 2018-08-06

## 2018-08-06 NOTE — TELEPHONE ENCOUNTER
Left message on Caroline's voicemail in regards to her brother Aroldo.      ----- Message from Ирина Avila sent at 8/6/2018  1:08 PM CDT -----  Contact: CAROLINE   Name of Who is Calling:Caroline Patient's sister       What is the request in detail: Caroline patient's sister is requesting a callback from staff in regards to his scheduled appointment for 8/8. Please contact to further discuss and advise      Can the clinic reply by [facility]: Yes       What Number to Call Back if not in [facility]: [phone]

## 2018-08-07 ENCOUNTER — PATIENT MESSAGE (OUTPATIENT)
Dept: INTERNAL MEDICINE | Facility: CLINIC | Age: 78
End: 2018-08-07

## 2018-08-07 NOTE — TELEPHONE ENCOUNTER
----- Message from Ирина Avila sent at 8/7/2018  4:12 PM CDT -----  Contact: Caroline   Name of Who is Calling: Elizabeth patient's spouse       What is the request in detail: Elizabeth patient's spouse states is requesting a callback from staff. Please contact to further discuss and advise    Can the clinic reply by [facility]: No       What Number to Call Back if not in [facility]: [phone]

## 2018-08-14 ENCOUNTER — TELEPHONE (OUTPATIENT)
Dept: GASTROENTEROLOGY | Facility: CLINIC | Age: 78
End: 2018-08-14

## 2018-08-14 NOTE — TELEPHONE ENCOUNTER
----- Message from Patricia Del Toro sent at 8/13/2018  3:59 PM CDT -----  Contact: Self- [phone]  Rice- pt called to reschedule his appt originally for today 8/13 at 2pm- please contact pt at [phone]

## 2018-08-14 NOTE — TELEPHONE ENCOUNTER
----- Message from Sun Bird sent at 8/13/2018 12:07 PM CDT -----  Contact: self - [phone]  [facility] - is asking to reschedule his appt - please call patient at [phone]

## 2018-08-15 ENCOUNTER — HOSPITAL ENCOUNTER (OUTPATIENT)
Dept: CARDIOLOGY | Facility: CLINIC | Age: 78
Discharge: HOME | End: 2018-08-15
Payer: COMMERCIAL

## 2018-08-15 ENCOUNTER — TELEPHONE (OUTPATIENT)
Dept: CARDIOLOGY | Facility: CLINIC | Age: 78
End: 2018-08-15

## 2018-08-15 ENCOUNTER — OFFICE VISIT (OUTPATIENT)
Dept: NEUROLOGY | Facility: CLINIC | Age: 78
End: 2018-08-15
Payer: COMMERCIAL

## 2018-08-15 ENCOUNTER — OFFICE VISIT (OUTPATIENT)
Dept: CARDIOLOGY | Facility: CLINIC | Age: 78
End: 2018-08-15
Payer: COMMERCIAL

## 2018-08-15 ENCOUNTER — TELEPHONE (OUTPATIENT)
Dept: NEUROLOGY | Facility: CLINIC | Age: 78
End: 2018-08-15

## 2018-08-15 VITALS
DIASTOLIC BLOOD PRESSURE: 66 MMHG | WEIGHT: 133.81 LBS | HEART RATE: 98 BPM | SYSTOLIC BLOOD PRESSURE: 118 MMHG | HEIGHT: 63 IN | BODY MASS INDEX: 23.71 KG/M2

## 2018-08-15 VITALS
HEART RATE: 80 BPM | HEIGHT: 63 IN | BODY MASS INDEX: 23.91 KG/M2 | SYSTOLIC BLOOD PRESSURE: 133 MMHG | DIASTOLIC BLOOD PRESSURE: 65 MMHG | WEIGHT: 134.94 LBS

## 2018-08-15 DIAGNOSIS — R00.2 PALPITATION: ICD-10-CM

## 2018-08-15 DIAGNOSIS — I10 HYPERTENSION, UNSPECIFIED TYPE: ICD-10-CM

## 2018-08-15 DIAGNOSIS — R06.09 DYSPNEA ON EXERTION: Primary | ICD-10-CM

## 2018-08-15 DIAGNOSIS — B20 HIV (HUMAN IMMUNODEFICIENCY VIRUS INFECTION): ICD-10-CM

## 2018-08-15 DIAGNOSIS — I25.10 CORONARY ARTERY DISEASE, ANGINA PRESENCE UNSPECIFIED, UNSPECIFIED VESSEL OR LESION TYPE, UNSPECIFIED WHETHER NATIVE OR TRANSPLANTED HEART: ICD-10-CM

## 2018-08-15 DIAGNOSIS — E78.5 HYPERLIPIDEMIA, UNSPECIFIED HYPERLIPIDEMIA TYPE: ICD-10-CM

## 2018-08-15 DIAGNOSIS — I10 ESSENTIAL HYPERTENSION: ICD-10-CM

## 2018-08-15 DIAGNOSIS — F41.8 DEPRESSION WITH ANXIETY: ICD-10-CM

## 2018-08-15 DIAGNOSIS — R41.3 MEMORY LOSS: Primary | ICD-10-CM

## 2018-08-15 DIAGNOSIS — R00.2 PALPITATION: Primary | ICD-10-CM

## 2018-08-15 PROCEDURE — 3074F SYST BP LT 130 MM HG: CPT | Mod: CPTII,S$GLB,, | Performed by: PSYCHIATRY & NEUROLOGY

## 2018-08-15 PROCEDURE — 93000 ELECTROCARDIOGRAM COMPLETE: CPT | Mod: S$GLB,,, | Performed by: INTERNAL MEDICINE

## 2018-08-15 PROCEDURE — 3078F DIAST BP <80 MM HG: CPT | Mod: CPTII,S$GLB,, | Performed by: PSYCHIATRY & NEUROLOGY

## 2018-08-15 PROCEDURE — 3075F SYST BP GE 130 - 139MM HG: CPT | Mod: CPTII,S$GLB,, | Performed by: INTERNAL MEDICINE

## 2018-08-15 PROCEDURE — 99999 PR PBB SHADOW E&M-EST. PATIENT-LVL III: CPT | Mod: PBBFAC,,, | Performed by: INTERNAL MEDICINE

## 2018-08-15 PROCEDURE — 99204 OFFICE O/P NEW MOD 45 MIN: CPT | Mod: S$GLB,,, | Performed by: PSYCHIATRY & NEUROLOGY

## 2018-08-15 PROCEDURE — 99213 OFFICE O/P EST LOW 20 MIN: CPT | Mod: S$GLB,,, | Performed by: INTERNAL MEDICINE

## 2018-08-15 PROCEDURE — 3078F DIAST BP <80 MM HG: CPT | Mod: CPTII,S$GLB,, | Performed by: INTERNAL MEDICINE

## 2018-08-15 PROCEDURE — 99999 PR PBB SHADOW E&M-EST. PATIENT-LVL III: CPT | Mod: PBBFAC,,, | Performed by: PSYCHIATRY & NEUROLOGY

## 2018-08-15 RX ORDER — NAPROXEN SODIUM 220 MG/1
81 TABLET, FILM COATED ORAL DAILY
Refills: 0 | COMMUNITY
Start: 2018-08-15 | End: 2019-08-26

## 2018-08-15 NOTE — PROGRESS NOTES
Subjective:       Patient ID: Aroldo Johnston Jr. is a 77 y.o. male.    Chief Complaint:  Memory Loss      History of Present Illness  HPI   This is a 77-year-old male who was referred for evaluation of memory difficulties.  He was a by his twin sister who was the primary historian as the patient tended to any problems.  It is to be noted that he was referred to see me late 2017 however missed several appointments.  His sister then took over managing his appointments as patient would not remember them.  The patient lives alone and is able to take care of his day-to-day needs including personal hygiene however his primary difficulty is that of retention of recent memory, remembering conversations he may have had and has had difficulty with word finding.  Symptoms appear to be much more pronounced over the past 6 months.  The sister who lives nearby now oversees his medications and helps him keep his appointments.  He continues to drive in the neighborhood but she reports that he has had a couple of close calls recently when he almost met with accidents.  She also reports that he has no insurance as he never did renew it.  The patient does not cook anymore but reports that he eats prepared foods.  He continues to manage his bills and reconcile his checking account.  He has no difficulty with sleep.  He denies any bowel or bladder difficulties or gait imbalance.  He has had back problems and described the age weakness in his extremities. However he has no difficulty with ambulating.  Medical problems include history of HIV.       Review of Systems  Review of Systems   Constitutional: Negative.    HENT: Negative.  Negative for hearing loss.    Eyes: Negative.  Negative for visual disturbance.   Respiratory: Negative.  Negative for shortness of breath.    Cardiovascular: Negative.  Negative for chest pain and palpitations.   Gastrointestinal: Negative.    Endocrine: Negative.    Genitourinary: Negative.     Musculoskeletal: Positive for back pain. Negative for gait problem.   Skin: Negative.    Allergic/Immunologic: Negative.    Neurological: Negative.  Negative for dizziness, tremors, seizures, syncope, speech difficulty, weakness, numbness and headaches.   Hematological: Negative.    Psychiatric/Behavioral: Positive for decreased concentration. Negative for sleep disturbance. The patient is nervous/anxious.        Objective:      Neurologic Exam     Mental Status   Oriented to person, place, and time.   Registration: recalls 3 of 3 objects. Recall at 5 minutes: recalls 1 of 3 objects. Follows 2 step commands.   Attention: normal. Concentration: normal.   Speech: speech is normal   Level of consciousness: alert  Knowledge: good.   Able to name object. Able to read. Able to repeat. Able to write. Normal comprehension.   The patient has difficulty giving me an adequate recent history with loss of detail.  He tends to minimize present cognitive problems.     Cranial Nerves   Cranial nerves II through XII intact.     Motor Exam   Muscle bulk: normal  Overall muscle tone: normal    Strength   Strength 5/5 throughout.     Sensory Exam   Light touch normal.   Proprioception normal.   Pinprick normal.     Gait, Coordination, and Reflexes     Gait  Gait: normal    Coordination   Romberg: negative  Finger to nose coordination: normal    Tremor   Resting tremor: absent  Intention tremor: absent  Action tremor: absent    Reflexes   Right brachioradialis: 1+  Left brachioradialis: 1+  Right biceps: 1+  Left biceps: 1+  Right triceps: 1+  Left triceps: 1+  Right patellar: 1+  Left patellar: 1+  Right achilles: 1+  Left achilles: 1+  Right plantar: normal  Left plantar: normal      Physical Exam   Constitutional: He is oriented to person, place, and time. He appears well-developed and well-nourished.   HENT:   Head: Normocephalic and atraumatic.   Neck: Normal range of motion. Neck supple. Carotid bruit is not present.    Neurological: He is oriented to person, place, and time. He has normal strength. He has a normal Finger-Nose-Finger Test and a normal Romberg Test. Gait normal.   Reflex Scores:       Tricep reflexes are 1+ on the right side and 1+ on the left side.       Bicep reflexes are 1+ on the right side and 1+ on the left side.       Brachioradialis reflexes are 1+ on the right side and 1+ on the left side.       Patellar reflexes are 1+ on the right side and 1+ on the left side.       Achilles reflexes are 1+ on the right side and 1+ on the left side.  Psychiatric: His speech is normal.   Vitals reviewed.        Assessment:        1. Memory loss    2. Depression with anxiety    3. HIV (human immunodeficiency virus infection)    4. Essential hypertension            Plan:       Discussed with patient and his sister.  He has memory difficulties may represent mild cognitive impairment on a vascular basis however the history of HIV may be a contributing factor.  Other contributing factors would include anxiety and depression.  He has had a CT scan of the brain done that was unremarkable though did show mild ventriculomegaly.  He does not have any bladder difficulties or gait imbalance at this time to suggest normal pressure hydrocephalus.  Blood work done was reviewed.  Will get neuropsychological testing prior to further recommendations.  The patient will be seen by me in follow-up after the neuropsychological testing.

## 2018-08-15 NOTE — PROGRESS NOTES
Cardiology Clinic Note  Reason for Visit: Re-establishing care    HPI:   Mr. Aroldo Johnston Jr. is a 77 y.o. gentleman with history of HTN/HLD/HIV/Prostate Ca s/p chemo/CAD s/p PCI 2008 with NICKIE to mRCA and inability to cross mOM1 lesion.    Reports leg weakness on ambulation.  It starts at about 1/2 block and he has to stop at 3 blocks.  He reports no lower extremity pain as such, just weakness.    No chest pain, nausea, vomiting, recent illnesses.  Does report DALL[place] at about 400-500 feet but complaints are non-specific  Patient has been having memory recall issues x 2-3 yrs    He was lost to follow up for several years and now wants to re-establish care.  He is on lipitor, is not on baby ASA, and is compliant with his HIV meds.  BP today is 133/65 and HR is 80.    ROS:    Review of Systems   Constitution: Negative.   HENT: Negative.    Eyes: Negative.    Cardiovascular: Positive for chest pain.   Respiratory: Negative.    Endocrine: Negative.    Skin: Negative.    Musculoskeletal: Negative.         Leg weakness with ambulation   Gastrointestinal: Negative.    Genitourinary: Negative.    Neurological: Negative.      PMH:     Past Medical History:   Diagnosis Date    Anemia     Anxiety     Coronary artery disease     Depression     HIV (human immunodeficiency virus infection)     Hypertension     Inflammatory bowel disease     colitis    Malignant neoplasm of colon, unspecified site     Colon cancer    Malignant neoplasm of colon, unspecified site     Colon cancer    Prostate cancer     STD (sexually transmitted disease)      Past Surgical History:   Procedure Laterality Date    cardiac stents      COLON SURGERY      NECK SURGERY      radiation       Allergies:   Review of patient's allergies indicates:  No Known Allergies  Medications:     Current Outpatient Medications on File Prior to Visit   Medication Sig Dispense Refill    atorvastatin (LIPITOR) 40 MG tablet Take 40 mg by mouth once  "daily.   1    HYDROcodone-acetaminophen (NORCO) 5-325 mg per tablet Take 1 tablet by mouth every 4 (four) hours as needed for Pain. 12 tablet 0    indinavir (CRIXIVAN) 400 MG capsule Take 2 capsules (800 mg total) by mouth 3 (three) times daily. 180 capsule 0    lamivudine-zidovudine 150-300mg (COMBIVIR) 150-300 mg Tab Take 1 tablet by mouth 2 (two) times daily. 60 tablet 0    LORazepam (ATIVAN) 1 MG tablet Take 1 mg by mouth every 6 (six) hours as needed for Anxiety.      meclizine (ANTIVERT) 25 mg tablet Take 1 tablet (25 mg total) by mouth 3 (three) times daily as needed. 20 tablet 0     No current facility-administered medications on file prior to visit.      Social History:     Social History     Tobacco Use    Smoking status: Former Smoker     Packs/day: 1.00     Years: 10.00     Pack years: 10.00     Types: Cigarettes     Last attempt to quit: 1983     Years since quittin.6    Smokeless tobacco: Never Used   Substance Use Topics    Alcohol use: Yes     Comment: occ drinks     Family History:     Family History   Problem Relation Age of Onset    Colon cancer Paternal Grandmother     Stomach cancer Neg Hx     Rectal cancer Neg Hx     Irritable bowel syndrome Neg Hx     Esophageal cancer Neg Hx     Crohn's disease Neg Hx     Celiac disease Neg Hx      Physical Exam:   /65 (BP Location: Left arm, Patient Position: Sitting, BP Method: Large (Automatic))   Pulse 80   Ht 5' 3" (1.6 m)   Wt 61.2 kg (134 lb 14.7 oz)   BMI 23.90 kg/m²    Physical Exam   Constitutional: He is oriented to person, place, and time. He appears well-developed and well-nourished.   HENT:   Head: Normocephalic and atraumatic.   Eyes: Conjunctivae and EOM are normal. Pupils are equal, round, and reactive to light.   Neck: Normal range of motion. Neck supple. No JVD present.   Cardiovascular: Normal rate, regular rhythm and normal heart sounds. Exam reveals no gallop and no friction rub.   No murmur " heard.  Pulses:       Dorsalis pedis pulses are 2+ on the right side, and 2+ on the left side.        Posterior tibial pulses are 2+ on the right side, and 2+ on the left side.   Pulmonary/Chest: Effort normal and breath sounds normal. No respiratory distress. He has no wheezes. He has no rales. He exhibits no tenderness.   Abdominal: Soft. Bowel sounds are normal. He exhibits no distension. There is no tenderness.   Musculoskeletal: Normal range of motion. He exhibits no edema or tenderness.   Neurological: He is alert and oriented to person, place, and time.   Skin: Skin is warm and dry. No erythema. No pallor.       Labs:     Lab Results   Component Value Date     06/25/2018    K 4.4 06/25/2018     06/25/2018    CO2 23 06/25/2018    BUN 9 06/25/2018    CREATININE 1.1 06/25/2018    ANIONGAP 8 06/25/2018     Lab Results   Component Value Date    HGBA1C 5.2 08/19/2015     No results found for: BNP, BNPTRIAGEBLO Lab Results   Component Value Date    WBC 8.08 06/22/2018    HGB 8.3 (L) 06/22/2018    HCT 25.4 (L) 06/22/2018    HCT 22 (L) 06/15/2018     (H) 06/22/2018    GRAN 4.6 06/22/2018    GRAN 56.5 06/22/2018     Lab Results   Component Value Date    CHOL 113 (L) 10/26/2017    HDL 24 (L) 10/26/2017    LDLCALC 33.8 (L) 10/26/2017    TRIG 276 (H) 10/26/2017          Imaging:   none    EKG: NSR with non-specific t wave changes throughout  Assessment:     1. Dyspnea on exertion    2. Coronary artery disease, angina presence unspecified, unspecified vessel or lesion type, unspecified whether native or transplanted heart    3. Hypertension, unspecified type    4. Hyperlipidemia, unspecified hyperlipidemia type          Plan:   Dyspnea on exertion  D[place] at about 400 feet, non-specific chest discomfort  No PND  Leg weakness, not described as pain  Not suspected to be coronary in origin    Coronary artery disease, angina presence unspecified, unspecified vessel or lesion type, unspecified whether native or  transplanted heart  Continue statin therapy  Start baby asa    Hypertension, unspecified type  Currently well controlled off medication, would not start anything at this time    Hyperlipidemia, unspecified hyperlipidemia type  Continue statin therapy, would check a lipid profile as his last one was > 1 year ago      Discussed with Dr. David. RTC in 3 months.     Signed:  Marquis Walters MD  8/15/2018 3:56 PM

## 2018-08-15 NOTE — PATIENT INSTRUCTIONS
Discussed with patient and his sister.  He has memory difficulties may represent mild cognitive impairment on a vascular basis however the history of HIV may be a contributing factor.  Other contributing factors would include anxiety and depression.  He has had a CT scan of the brain done that was unremarkable though did show mild ventriculomegaly.  He does not have any bladder difficulties or gait imbalance at this time to suggest normal pressure hydrocephalus.  Blood work done was reviewed.  Will get neuropsychological testing prior to further recommendations.  The patient will be seen by me in follow-up after the neuropsychological testing.

## 2018-08-15 NOTE — PROGRESS NOTES
I have reviewed the fellow's history and physical and discussed the case with the fellow. I have personally spoken with and examined the patient in the clinic. I agree with the findings, assessment, and plan.  Patient notes he has some chest discomfort with exertion, but cannot remember well enough to fully determine if this is truly angina. Patient does not remember pror ischemic symptoms.  His sister notes mild exertional dyspnea, which may be separate form chest discomfort.  Will follow treatment plan as outlined by Dr. Walters and determine if symptoms are significant enough to warrant repeat coronary evaluation.  There is no evidence from the history or physical exam that the leg pain/weakness, which is the most prominent concern of the patient, is vascular in origin.  Other causes for his leg symptoms should be evaluated.

## 2018-08-16 ENCOUNTER — PATIENT MESSAGE (OUTPATIENT)
Dept: NEUROLOGY | Facility: CLINIC | Age: 78
End: 2018-08-16

## 2018-08-17 ENCOUNTER — LAB VISIT (OUTPATIENT)
Dept: LAB | Facility: OTHER | Age: 78
End: 2018-08-17
Payer: COMMERCIAL

## 2018-08-17 DIAGNOSIS — E78.5 HYPERLIPIDEMIA, UNSPECIFIED HYPERLIPIDEMIA TYPE: ICD-10-CM

## 2018-08-17 LAB
CHOLEST SERPL-MCNC: 88 MG/DL
CHOLEST/HDLC SERPL: 4 {RATIO}
HDLC SERPL-MCNC: 22 MG/DL
HDLC SERPL: 25 %
LDLC SERPL CALC-MCNC: 43.4 MG/DL
NONHDLC SERPL-MCNC: 66 MG/DL
TRIGL SERPL-MCNC: 113 MG/DL

## 2018-08-17 PROCEDURE — 80061 LIPID PANEL: CPT

## 2018-08-17 PROCEDURE — 36415 COLL VENOUS BLD VENIPUNCTURE: CPT

## 2018-08-20 ENCOUNTER — TELEPHONE (OUTPATIENT)
Dept: INTERNAL MEDICINE | Facility: CLINIC | Age: 78
End: 2018-08-20

## 2018-08-20 NOTE — TELEPHONE ENCOUNTER
Please call patient     ---- Message from Jacquelyn Ortega sent at 8/20/2018  3:55 PM CDT -----  Contact: pt            Name of Who is Calling: pt      What is the request in detail: pt needs to be seen by infectious disease for hiv. Please call pt      Can the clinic reply by [facility]: no      [facility]: [phone]

## 2018-08-20 NOTE — TELEPHONE ENCOUNTER
Called pt and informed him that unfortunately he will not be able to receive infectious disease care at [facility] despite our appeals. It was denied due to the fact that he was asked to seek treatment elsewhere twice. Please continue to see current infectious disease MD.pt showed verbal understanding      LVm for sister

## 2018-08-20 NOTE — TELEPHONE ENCOUNTER
Please let pt know (along with pt sister as pt has memory problems) that unfortunately he will not be able to receive infectious disease care at [facility] despite our appeals. It was denied due to the fact that he was asked to seek treatment elsewhere twice. Please continue to see current infectious disease MD.

## 2018-08-21 ENCOUNTER — PATIENT MESSAGE (OUTPATIENT)
Dept: INTERNAL MEDICINE | Facility: CLINIC | Age: 78
End: 2018-08-21

## 2018-08-29 ENCOUNTER — PATIENT MESSAGE (OUTPATIENT)
Dept: INTERNAL MEDICINE | Facility: CLINIC | Age: 78
End: 2018-08-29

## 2018-09-05 RX ORDER — MIRTAZAPINE 15 MG/1
15 TABLET, FILM COATED ORAL NIGHTLY
Qty: 60 TABLET | Refills: 2 | Status: SHIPPED | OUTPATIENT
Start: 2018-09-05 | End: 2018-11-01 | Stop reason: SDUPTHER

## 2018-09-05 NOTE — TELEPHONE ENCOUNTER
----- Message from Sophia Bautista sent at 9/5/2018  8:20 AM CDT -----  Contact: Pt  Can the clinic reply in [facility]:   No      Please refill the medication(s) listed below. Please call the patient when the prescription(s) is ready for  at the phone number [phone]    Medication #1: Mirtazapine 2 month supply     Medication #2:      Preferred Pharmacy:[facility] [address] [place] [address] [id number] [facility]

## 2018-09-26 ENCOUNTER — OFFICE VISIT (OUTPATIENT)
Dept: NEUROLOGY | Facility: CLINIC | Age: 78
End: 2018-09-26
Payer: COMMERCIAL

## 2018-09-26 VITALS
BODY MASS INDEX: 23.75 KG/M2 | WEIGHT: 134.06 LBS | HEART RATE: 80 BPM | SYSTOLIC BLOOD PRESSURE: 113 MMHG | DIASTOLIC BLOOD PRESSURE: 70 MMHG | HEIGHT: 63 IN

## 2018-09-26 DIAGNOSIS — B20 HIV (HUMAN IMMUNODEFICIENCY VIRUS INFECTION): ICD-10-CM

## 2018-09-26 DIAGNOSIS — G62.9 SENSORY NEUROPATHY: Primary | ICD-10-CM

## 2018-09-26 DIAGNOSIS — R41.3 MEMORY LOSS: ICD-10-CM

## 2018-09-26 PROCEDURE — 1101F PT FALLS ASSESS-DOCD LE1/YR: CPT | Mod: CPTII,S$GLB,, | Performed by: STUDENT IN AN ORGANIZED HEALTH CARE EDUCATION/TRAINING PROGRAM

## 2018-09-26 PROCEDURE — 96372 THER/PROPH/DIAG INJ SC/IM: CPT | Mod: S$GLB,,, | Performed by: PSYCHIATRY & NEUROLOGY

## 2018-09-26 PROCEDURE — 3078F DIAST BP <80 MM HG: CPT | Mod: CPTII,S$GLB,, | Performed by: STUDENT IN AN ORGANIZED HEALTH CARE EDUCATION/TRAINING PROGRAM

## 2018-09-26 PROCEDURE — 99214 OFFICE O/P EST MOD 30 MIN: CPT | Mod: S$GLB,,, | Performed by: STUDENT IN AN ORGANIZED HEALTH CARE EDUCATION/TRAINING PROGRAM

## 2018-09-26 PROCEDURE — 99999 PR PBB SHADOW E&M-EST. PATIENT-LVL III: CPT | Mod: PBBFAC,,, | Performed by: STUDENT IN AN ORGANIZED HEALTH CARE EDUCATION/TRAINING PROGRAM

## 2018-09-26 PROCEDURE — 3074F SYST BP LT 130 MM HG: CPT | Mod: CPTII,S$GLB,, | Performed by: STUDENT IN AN ORGANIZED HEALTH CARE EDUCATION/TRAINING PROGRAM

## 2018-09-26 RX ORDER — CYANOCOBALAMIN 1000 UG/ML
1000 INJECTION, SOLUTION INTRAMUSCULAR; SUBCUTANEOUS ONCE
Status: SHIPPED | OUTPATIENT
Start: 2018-09-26

## 2018-09-26 NOTE — PATIENT INSTRUCTIONS
- B12 deficiency is likely contributing to your weakness symptoms  - Today you got a B12 shot for the deficiency  - Recommendation to start taking 1000 mcg B12 at home orally daily  - Another option is to get a B12 shot at least twice a month at the PCP office   - Continue to follow up w/ Dr. Ponce for memory issues  - Agree with Cardiology to start taking ASA 81 mg daily   - Follow up here as needed

## 2018-09-26 NOTE — PROGRESS NOTES
"Neurology Clinic  Follow-up Visit    Patient Name: Aroldo Johnston Jr.  MRN: [id number]    CC: Weakness in b/l lower extremities     HPI: Aroldo Johnston Jr. is a 77 y.o. male w/ PMH significant for HIV, Hx of prostate and colon cancer ( per sister prostate cancer is recurrent at this point of time), Mild cognitive impairment (followed by ), depression w/ anxiety and essential HTN  presenting as a self-referral for evaluation of b/l leg weakness. He is accompanied by his twin sister who provides the bulk of the correct information during the visit. Patient states that he has experienced difficulty ambulating for the past 2-3 years initially, then states that this has been going on for about 6 months. States that he notices the onset of the problem to be when ambulating long distances, but states that this also doesn't bother him very much. He denies any pain, denies weakness, denies heaviness, "jello legs", numbness, tingling. States that it just becomes " harder to walk" and states that he "cannot articulate it any better". Denies any changes in sensation, denies any recent falls, denies any dizziness w/ ambulation or loss of balance.  Of note, patient was seen by Dr. Ponce on 08/15 and did mention his back problems and described weakness in his lower extremities extremities, that did not affect his ambulation. On same day he saw his cardiologist, where he described leg weakness on ambulation, that starts at about 1/2 block and he has to stop at 3 blocks; no lower extremity pain as such, just weakness.    Patient episodically gets more agitated during the interview, getting frustrated with " all the questions, that have nothing to do with my legs". Able to explain to patient the necessity of the questions and sister assists w/ redirection.   Patient lives alone at home and sister states that he has difficulty remembering things, mostly short term memory seems to be affected. States that he is also " pretty resistant to starting new medication, although is compliant with his anti-retroviral therapy medication. Also reports visiting the [facility] gym at least 3-4 times per week w/ about 1 hour exercise session per visit. Denies any difficulty with his physical training and reports he is in great physical shape.    Review of Systems:  General: No fevers, chills  Eyes: No changes in vision  ENT: No changes in hearing  Respiratory: No SOB  CV: No chest pain, palpitations  GI: No diarrhea, blood in stool  Urinary: No dysuria, hematuria  Skin: No rashes  Neurological: No weakness, confusion  Psychiatric: No auditory nor visual hallucinations      Past Medical History  Past Medical History:   Diagnosis Date    Anemia     Anxiety     Coronary artery disease     Depression     HIV (human immunodeficiency virus infection)     Hypertension     Inflammatory bowel disease     colitis    Malignant neoplasm of colon, unspecified site     Colon cancer    Malignant neoplasm of colon, unspecified site     Colon cancer    Prostate cancer     STD (sexually transmitted disease)        Medications    Current Outpatient Medications:     atorvastatin (LIPITOR) 40 MG tablet, Take 40 mg by mouth once daily. , Disp: , Rfl: 1    indinavir (CRIXIVAN) 400 MG capsule, Take 2 capsules (800 mg total) by mouth 3 (three) times daily., Disp: 180 capsule, Rfl: 0    lamivudine-zidovudine 150-300mg (COMBIVIR) 150-300 mg Tab, Take 1 tablet by mouth 2 (two) times daily., Disp: 60 tablet, Rfl: 0    LORazepam (ATIVAN) 1 MG tablet, Take 1 mg by mouth every 6 (six) hours as needed for Anxiety., Disp: , Rfl:     mirtazapine (REMERON) 15 MG tablet, Take 1 tablet (15 mg total) by mouth every evening., Disp: 60 tablet, Rfl: 2    aspirin 81 MG Chew, Take 1 tablet (81 mg total) by mouth once daily., Disp: , Rfl: 0    Current Facility-Administered Medications:     cyanocobalamin injection 1,000 mcg, 1,000 mcg, Intramuscular, Once, Sofie Foster,  "MD  Any other notable medications as documented in HPI    Allergies  Review of patient's allergies indicates:  No Known Allergies    Social History  Social History     Socioeconomic History    Marital status: Single     Spouse name: Not on file    Number of children: Not on file    Years of education: Not on file    Highest education level: Not on file   Social Needs    Financial resource strain: Not on file    Food insecurity - worry: Not on file    Food insecurity - inability: Not on file    Transportation needs - medical: Not on file    Transportation needs - non-medical: Not on file   Occupational History    Not on file   Tobacco Use    Smoking status: Former Smoker     Packs/day: 1.00     Years: 10.00     Pack years: 10.00     Types: Cigarettes     Last attempt to quit: 1983     Years since quittin.7    Smokeless tobacco: Never Used   Substance and Sexual Activity    Alcohol use: Yes     Comment: occ drinks    Drug use: No    Sexual activity: Yes     Partners: Female   Other Topics Concern    Not on file   Social History Narrative    Not on file     Any other notable Social History as documented in HPI.    Family History  Family History   Problem Relation Age of Onset    Colon cancer Paternal Grandmother     Stomach cancer Neg Hx     Rectal cancer Neg Hx     Irritable bowel syndrome Neg Hx     Esophageal cancer Neg Hx     Crohn's disease Neg Hx     Celiac disease Neg Hx      Any other notable FMH as documented in HPI.    Physical Exam  /70   Pulse 80   Ht 5' 3" (1.6 m)   Wt 60.8 kg (134 lb 0.6 oz)   BMI 23.74 kg/m²     General: Well-developed, well-groomed. No apparent distress  HENT: Normocephalic, atraumatic.   Cardiovascular: Regular rate and rhythm with no murmurs, rubs or gallops.    Chest: Lungs clear to auscultation bilaterally.  No wheezes, stridor, ronchi appreciated.  Abdomen: Normoactive bowel sounds present.  Soft, nontender to palpation.  Musculoskeletal: " "No peripheral edema    Neurologic Exam: The patient is awake, alert and oriented. Language is fluent.  Fund of knowledge is appropriate. At times gets frustrated and agitated 2/2 "all of the questions".    Cranial nerves:   Pupils are round and reactive to light and accommodation. Visual fields are full to confrontation.    Ocular motility is full in all cardinal positions of gaze.   Facial sensation is normal to light touch.   Facial activation is symmetric.   Hearing is symmetric bilaterally.   Palate elevates symmetrically.   Shoulder elevation is symmetric and full strength bilaterally.   Tongue is midline and neck rotation strength is normal bilaterally.      Motor examination of all extremities demonstrates normal bulk and tone in all four limbs. There are no atrophy or fasciculations. Strength is 5/5 in the upper and lower extremities bilaterally.    Sensory examination is normal light touch in BUE and BLE.  Romberg is negative.  Sensation to light touch: intact in BUE and BLE, although patient reports slight decreased sensation on the RLE  Sensation to temperature: intact in BUE and BLE, although patient reports slight decrease in temperature sensation on the RLE  Sensation to vibration:intact in BUE and decreased in BLE  Sensation to pinprick: intact in BUE and BLE  Proprioception: intact in BUE and BLE    Deep tendon reflexes are 1+ and symmetric in the upper and lower extremities bilaterally.  No clonus, downgoing toes b/l.    Gait: Normal tandem, and casual gait. No foot drop noted    Coordination: Finger to nose and heel to shin testing is normal in both upper and lower extremities.    Lab and Test Results    WBC   Date Value Ref Range Status   06/22/2018 8.08 3.90 - 12.70 K/uL Final   06/12/2018 15.44 (H) 3.90 - 12.70 K/uL Final   06/11/2018 16.40 (H) 3.90 - 12.70 K/uL Final     Hemoglobin   Date Value Ref Range Status   06/22/2018 8.3 (L) 14.0 - 18.0 g/dL Final   06/12/2018 8.3 (L) 14.0 - 18.0 g/dL " Final   06/11/2018 8.7 (L) 14.0 - 18.0 g/dL Final     POC Hematocrit   Date Value Ref Range Status   06/15/2018 22 (L) 36 - 54 %PCV Final     Hematocrit   Date Value Ref Range Status   06/22/2018 25.4 (L) 40.0 - 54.0 % Final   06/12/2018 23.9 (L) 40.0 - 54.0 % Final   06/11/2018 25.5 (L) 40.0 - 54.0 % Final     Platelets   Date Value Ref Range Status   06/22/2018 506 (H) 150 - 350 K/uL Final   06/12/2018 295 150 - 350 K/uL Final   06/11/2018 250 150 - 350 K/uL Final     Glucose   Date Value Ref Range Status   06/25/2018 104 70 - 110 mg/dL Final   06/08/2018 114 (H) 70 - 110 mg/dL Final   12/23/2017 110 70 - 110 mg/dL Final     Sodium   Date Value Ref Range Status   06/25/2018 139 136 - 145 mmol/L Final   06/08/2018 135 (L) 136 - 145 mmol/L Final   12/23/2017 138 136 - 145 mmol/L Final     Potassium   Date Value Ref Range Status   06/25/2018 4.4 3.5 - 5.1 mmol/L Final   06/08/2018 4.3 3.5 - 5.1 mmol/L Final   12/23/2017 4.1 3.5 - 5.1 mmol/L Final     Chloride   Date Value Ref Range Status   06/25/2018 108 95 - 110 mmol/L Final   06/08/2018 107 95 - 110 mmol/L Final   12/23/2017 104 95 - 110 mmol/L Final     CO2   Date Value Ref Range Status   06/25/2018 23 23 - 29 mmol/L Final   06/08/2018 19 (L) 23 - 29 mmol/L Final   12/23/2017 25 23 - 29 mmol/L Final     BUN, Bld   Date Value Ref Range Status   06/25/2018 9 8 - 23 mg/dL Final   06/08/2018 13 8 - 23 mg/dL Final   12/23/2017 13 8 - 23 mg/dL Final     Creatinine   Date Value Ref Range Status   06/25/2018 1.1 0.5 - 1.4 mg/dL Final   06/08/2018 1.1 0.5 - 1.4 mg/dL Final   12/23/2017 1.4 0.5 - 1.4 mg/dL Final     Calcium   Date Value Ref Range Status   06/25/2018 9.2 8.7 - 10.5 mg/dL Final   06/08/2018 9.5 8.7 - 10.5 mg/dL Final   12/23/2017 9.5 8.7 - 10.5 mg/dL Final     Magnesium   Date Value Ref Range Status   12/23/2017 2.0 1.6 - 2.6 mg/dL Final   05/05/2008 2.3 1.6 - 2.6 mg/dl Final     Phosphorus   Date Value Ref Range Status   05/05/2008 2.6 (L) 2.7 - 4.5 mg/dl  Final     Alkaline Phosphatase   Date Value Ref Range Status   06/25/2018 71 55 - 135 U/L Final   12/23/2017 71 55 - 135 U/L Final   10/26/2017 77 55 - 135 U/L Final     ALT   Date Value Ref Range Status   06/25/2018 31 10 - 44 U/L Final   12/23/2017 31 10 - 44 U/L Final   10/26/2017 26 10 - 44 U/L Final     AST   Date Value Ref Range Status   06/25/2018 30 10 - 40 U/L Final   12/23/2017 33 10 - 40 U/L Final   10/26/2017 30 10 - 40 U/L Final       Assessment and Plan    Problem List Items Addressed This Visit        Neuro    Memory loss    Current Assessment & Plan     - Continue following up w/ [facility]          Sensory neuropathy - Primary    Current Assessment & Plan     - B12 deficiency is likely contributing to patient's weakness as upon chart review he has been borderline low or nelson for the past 2 years and has not been taking any supplementation at home  - B12 IM injection given during visit   - Recommendation to start taking 1000 mcg B12 at home orally daily vs. periodic B12 shot at least twice a month at the PCP office   - Continue to follow up w/ Dr. Ponce for memory issues  - Agree with Cardiology to start taking ASA 81 mg daily   - Follow up in clinic if needed              ID    HIV (human immunodeficiency virus infection)    Current Assessment & Plan     - Compliant w/ anti-retroviral therapy per patient and sister  - Continue appropriate follow up w/ ID                 Sofie Foster   Neurology Resident   [facility]  [address] [place] [address]

## 2018-09-30 PROBLEM — G62.9 SENSORY NEUROPATHY: Status: ACTIVE | Noted: 2018-09-30

## 2018-09-30 NOTE — ASSESSMENT & PLAN NOTE
- B12 deficiency is likely contributing to patient's weakness as upon chart review he has been borderline low or nelson for the past 2 years and has not been taking any supplementation at home  - B12 IM injection given during visit   - Recommendation to start taking 1000 mcg B12 at home orally daily vs. periodic B12 shot at least twice a month at the PCP office   - Continue to follow up w/ Dr. Ponce for memory issues  - Agree with Cardiology to start taking ASA 81 mg daily   - Follow up in clinic if needed

## 2018-09-30 NOTE — ASSESSMENT & PLAN NOTE
- Compliant w/ anti-retroviral therapy per patient and sister  - Continue appropriate follow up w/ ID

## 2018-11-01 DIAGNOSIS — F41.8 DEPRESSION WITH ANXIETY: Primary | ICD-10-CM

## 2018-11-01 RX ORDER — MIRTAZAPINE 15 MG/1
15 TABLET, FILM COATED ORAL NIGHTLY
Qty: 60 TABLET | Refills: 2 | Status: SHIPPED | OUTPATIENT
Start: 2018-11-01 | End: 2019-07-01 | Stop reason: SDUPTHER

## 2018-11-14 ENCOUNTER — PATIENT MESSAGE (OUTPATIENT)
Dept: UROLOGY | Facility: CLINIC | Age: 78
End: 2018-11-14

## 2018-11-26 RX ORDER — ATORVASTATIN CALCIUM 40 MG/1
TABLET, FILM COATED ORAL
Qty: 90 TABLET | Refills: 0 | Status: SHIPPED | OUTPATIENT
Start: 2018-11-26 | End: 2019-03-04 | Stop reason: SDUPTHER

## 2018-12-03 ENCOUNTER — TELEPHONE (OUTPATIENT)
Dept: ENDOSCOPY | Facility: HOSPITAL | Age: 78
End: 2018-12-03

## 2019-01-07 ENCOUNTER — LAB VISIT (OUTPATIENT)
Dept: LAB | Facility: OTHER | Age: 79
End: 2019-01-07
Attending: UROLOGY
Payer: COMMERCIAL

## 2019-01-07 DIAGNOSIS — C61 PROSTATE CANCER: ICD-10-CM

## 2019-01-07 LAB — COMPLEXED PSA SERPL-MCNC: 6.1 NG/ML

## 2019-01-07 PROCEDURE — 36415 COLL VENOUS BLD VENIPUNCTURE: CPT

## 2019-01-07 PROCEDURE — 84153 ASSAY OF PSA TOTAL: CPT

## 2019-01-14 ENCOUNTER — OFFICE VISIT (OUTPATIENT)
Dept: UROLOGY | Facility: CLINIC | Age: 79
End: 2019-01-14
Attending: UROLOGY
Payer: COMMERCIAL

## 2019-01-14 VITALS
DIASTOLIC BLOOD PRESSURE: 78 MMHG | BODY MASS INDEX: 24.24 KG/M2 | HEIGHT: 63 IN | HEART RATE: 94 BPM | SYSTOLIC BLOOD PRESSURE: 156 MMHG | WEIGHT: 136.81 LBS

## 2019-01-14 DIAGNOSIS — R97.20 ELEVATED PSA: ICD-10-CM

## 2019-01-14 DIAGNOSIS — N13.30 HYDRONEPHROSIS, LEFT: ICD-10-CM

## 2019-01-14 DIAGNOSIS — C61 PROSTATE CANCER: Primary | ICD-10-CM

## 2019-01-14 PROCEDURE — 99213 OFFICE O/P EST LOW 20 MIN: CPT | Mod: S$GLB,,, | Performed by: UROLOGY

## 2019-01-14 PROCEDURE — 3078F PR MOST RECENT DIASTOLIC BLOOD PRESSURE < 80 MM HG: ICD-10-PCS | Mod: CPTII,S$GLB,, | Performed by: UROLOGY

## 2019-01-14 PROCEDURE — 3078F DIAST BP <80 MM HG: CPT | Mod: CPTII,S$GLB,, | Performed by: UROLOGY

## 2019-01-14 PROCEDURE — 99213 PR OFFICE/OUTPT VISIT, EST, LEVL III, 20-29 MIN: ICD-10-PCS | Mod: S$GLB,,, | Performed by: UROLOGY

## 2019-01-14 PROCEDURE — 1101F PT FALLS ASSESS-DOCD LE1/YR: CPT | Mod: CPTII,S$GLB,, | Performed by: UROLOGY

## 2019-01-14 PROCEDURE — 1101F PR PT FALLS ASSESS DOC 0-1 FALLS W/OUT INJ PAST YR: ICD-10-PCS | Mod: CPTII,S$GLB,, | Performed by: UROLOGY

## 2019-01-14 PROCEDURE — 3077F SYST BP >= 140 MM HG: CPT | Mod: CPTII,S$GLB,, | Performed by: UROLOGY

## 2019-01-14 PROCEDURE — 3077F PR MOST RECENT SYSTOLIC BLOOD PRESSURE >= 140 MM HG: ICD-10-PCS | Mod: CPTII,S$GLB,, | Performed by: UROLOGY

## 2019-02-07 ENCOUNTER — OFFICE VISIT (OUTPATIENT)
Dept: INTERNAL MEDICINE | Facility: CLINIC | Age: 79
End: 2019-02-07
Attending: INTERNAL MEDICINE
Payer: COMMERCIAL

## 2019-02-07 ENCOUNTER — LAB VISIT (OUTPATIENT)
Dept: LAB | Facility: OTHER | Age: 79
End: 2019-02-07
Attending: INTERNAL MEDICINE
Payer: COMMERCIAL

## 2019-02-07 VITALS
DIASTOLIC BLOOD PRESSURE: 62 MMHG | HEIGHT: 62 IN | RESPIRATION RATE: 12 BRPM | BODY MASS INDEX: 24.75 KG/M2 | SYSTOLIC BLOOD PRESSURE: 108 MMHG | OXYGEN SATURATION: 95 % | WEIGHT: 134.5 LBS | HEART RATE: 89 BPM

## 2019-02-07 DIAGNOSIS — Z00.00 ANNUAL PHYSICAL EXAM: ICD-10-CM

## 2019-02-07 DIAGNOSIS — B20 HIV (HUMAN IMMUNODEFICIENCY VIRUS INFECTION): ICD-10-CM

## 2019-02-07 DIAGNOSIS — Z12.11 SCREENING FOR COLORECTAL CANCER: ICD-10-CM

## 2019-02-07 DIAGNOSIS — Z12.12 SCREENING FOR COLORECTAL CANCER: ICD-10-CM

## 2019-02-07 DIAGNOSIS — C61 PROSTATE CANCER: ICD-10-CM

## 2019-02-07 DIAGNOSIS — K51.50 LEFT SIDED ULCERATIVE COLITIS WITHOUT COMPLICATION: ICD-10-CM

## 2019-02-07 DIAGNOSIS — Z00.00 ANNUAL PHYSICAL EXAM: Primary | ICD-10-CM

## 2019-02-07 DIAGNOSIS — Z23 NEED FOR PNEUMOCOCCAL VACCINATION: ICD-10-CM

## 2019-02-07 DIAGNOSIS — I10 ESSENTIAL HYPERTENSION: ICD-10-CM

## 2019-02-07 LAB
ALBUMIN SERPL BCP-MCNC: 3.7 G/DL
ALP SERPL-CCNC: 71 U/L
ALT SERPL W/O P-5'-P-CCNC: 34 U/L
ANION GAP SERPL CALC-SCNC: 8 MMOL/L
AST SERPL-CCNC: 36 U/L
BASOPHILS # BLD AUTO: 0.07 K/UL
BASOPHILS NFR BLD: 0.9 %
BILIRUB SERPL-MCNC: 0.3 MG/DL
BUN SERPL-MCNC: 7 MG/DL
CALCIUM SERPL-MCNC: 9.1 MG/DL
CHLORIDE SERPL-SCNC: 106 MMOL/L
CHOLEST SERPL-MCNC: 123 MG/DL
CHOLEST/HDLC SERPL: 4.2 {RATIO}
CO2 SERPL-SCNC: 24 MMOL/L
CREAT SERPL-MCNC: 1 MG/DL
DIFFERENTIAL METHOD: ABNORMAL
EOSINOPHIL # BLD AUTO: 0.3 K/UL
EOSINOPHIL NFR BLD: 3.4 %
ERYTHROCYTE [DISTWIDTH] IN BLOOD BY AUTOMATED COUNT: 14.4 %
EST. GFR  (AFRICAN AMERICAN): >60 ML/MIN/1.73 M^2
EST. GFR  (NON AFRICAN AMERICAN): >60 ML/MIN/1.73 M^2
ESTIMATED AVG GLUCOSE: 111 MG/DL
FERRITIN SERPL-MCNC: 12 NG/ML
GLUCOSE SERPL-MCNC: 99 MG/DL
HBA1C MFR BLD HPLC: 5.5 %
HCT VFR BLD AUTO: 39.4 %
HDLC SERPL-MCNC: 29 MG/DL
HDLC SERPL: 23.6 %
HGB BLD-MCNC: 12.6 G/DL
IRON SERPL-MCNC: 24 UG/DL
LDLC SERPL CALC-MCNC: 37.2 MG/DL
LYMPHOCYTES # BLD AUTO: 2.2 K/UL
LYMPHOCYTES NFR BLD: 28.3 %
MCH RBC QN AUTO: 32.5 PG
MCHC RBC AUTO-ENTMCNC: 32 G/DL
MCV RBC AUTO: 102 FL
MONOCYTES # BLD AUTO: 0.8 K/UL
MONOCYTES NFR BLD: 10.6 %
NEUTROPHILS # BLD AUTO: 4.5 K/UL
NEUTROPHILS NFR BLD: 56.5 %
NONHDLC SERPL-MCNC: 94 MG/DL
PLATELET # BLD AUTO: 291 K/UL
PMV BLD AUTO: 9.2 FL
POTASSIUM SERPL-SCNC: 3.9 MMOL/L
PROT SERPL-MCNC: 7.7 G/DL
RBC # BLD AUTO: 3.88 M/UL
SATURATED IRON: 6 %
SODIUM SERPL-SCNC: 138 MMOL/L
TOTAL IRON BINDING CAPACITY: 394 UG/DL
TRANSFERRIN SERPL-MCNC: 266 MG/DL
TRIGL SERPL-MCNC: 284 MG/DL
TSH SERPL DL<=0.005 MIU/L-ACNC: 2.59 UIU/ML
VIT B12 SERPL-MCNC: 375 PG/ML
WBC # BLD AUTO: 7.89 K/UL

## 2019-02-07 PROCEDURE — 99397 PER PM REEVAL EST PAT 65+ YR: CPT | Mod: S$GLB,,, | Performed by: INTERNAL MEDICINE

## 2019-02-07 PROCEDURE — 82607 VITAMIN B-12: CPT

## 2019-02-07 PROCEDURE — 80061 LIPID PANEL: CPT

## 2019-02-07 PROCEDURE — 82728 ASSAY OF FERRITIN: CPT

## 2019-02-07 PROCEDURE — 36415 COLL VENOUS BLD VENIPUNCTURE: CPT

## 2019-02-07 PROCEDURE — 3078F DIAST BP <80 MM HG: CPT | Mod: CPTII,S$GLB,, | Performed by: INTERNAL MEDICINE

## 2019-02-07 PROCEDURE — 83036 HEMOGLOBIN GLYCOSYLATED A1C: CPT

## 2019-02-07 PROCEDURE — 3078F PR MOST RECENT DIASTOLIC BLOOD PRESSURE < 80 MM HG: ICD-10-PCS | Mod: CPTII,S$GLB,, | Performed by: INTERNAL MEDICINE

## 2019-02-07 PROCEDURE — 85025 COMPLETE CBC W/AUTO DIFF WBC: CPT

## 2019-02-07 PROCEDURE — 3074F SYST BP LT 130 MM HG: CPT | Mod: CPTII,S$GLB,, | Performed by: INTERNAL MEDICINE

## 2019-02-07 PROCEDURE — 99999 PR PBB SHADOW E&M-EST. PATIENT-LVL IV: CPT | Mod: PBBFAC,,, | Performed by: INTERNAL MEDICINE

## 2019-02-07 PROCEDURE — 99999 PR PBB SHADOW E&M-EST. PATIENT-LVL IV: ICD-10-PCS | Mod: PBBFAC,,, | Performed by: INTERNAL MEDICINE

## 2019-02-07 PROCEDURE — 3074F PR MOST RECENT SYSTOLIC BLOOD PRESSURE < 130 MM HG: ICD-10-PCS | Mod: CPTII,S$GLB,, | Performed by: INTERNAL MEDICINE

## 2019-02-07 PROCEDURE — 83540 ASSAY OF IRON: CPT

## 2019-02-07 PROCEDURE — 84443 ASSAY THYROID STIM HORMONE: CPT

## 2019-02-07 PROCEDURE — 99397 PR PREVENTIVE VISIT,EST,65 & OVER: ICD-10-PCS | Mod: S$GLB,,, | Performed by: INTERNAL MEDICINE

## 2019-02-07 PROCEDURE — 80053 COMPREHEN METABOLIC PANEL: CPT

## 2019-02-07 RX ORDER — LANOLIN ALCOHOL/MO/W.PET/CERES
1000 CREAM (GRAM) TOPICAL DAILY
Qty: 90 TABLET | Refills: 2 | Status: SHIPPED | OUTPATIENT
Start: 2019-02-07 | End: 2019-08-26

## 2019-02-07 NOTE — PROGRESS NOTES
"Subjective:       Patient ID: Aroldo Johnston Jr. is a 78 y.o. male.    Chief Complaint: No chief complaint on file.    Here for annual exam    76 yo M c/ PMHx of ulcerative colitis, HIV, recurrent prostate CA s/p prostatectomy, worsening dementia, b12 deficiency presents for routine exam unaccompanied. First minutes of appointment is patient questioning aloud who made the appointment or requested the meeting but is otherwise happy to be here today. He reports the same symptoms of a sensation in his bilateral posterior thighs that he has reported for the past approx 1.5 yrs. No pain. Non debilitating. Still able to perform his typical exercise routine at [facility]. He does not believe he taking b12. Denies GI symptoms of abd pain, BRBPR. Saw Dr Bal 4/2108 and is due for colonscopy. I have urged him to do this and will let his sister know this is priority via [employer]. She is his proxy on account.          Review of Systems    Objective:      Vitals:    02/07/19 1338   BP: 108/62   BP Location: Left arm   Patient Position: Sitting   BP Method: Medium (Manual)   Pulse: 89   Resp: 12   SpO2: 95%   Weight: 61 kg (134 lb 7.7 oz)   Height: 5' 2" (1.575 m)      Physical Exam   Constitutional: He is oriented to person, place, and time. He appears well-developed and well-nourished. No distress.   HENT:   Head: Normocephalic and atraumatic.   Mouth/Throat: Oropharynx is clear and moist. No oropharyngeal exudate.   Eyes: Conjunctivae and EOM are normal. Pupils are equal, round, and reactive to light. No scleral icterus.   Neck: No thyromegaly present.   Cardiovascular: Normal rate, regular rhythm and normal heart sounds.   No murmur heard.  Pulmonary/Chest: Effort normal and breath sounds normal. He has no wheezes. He has no rales.   Abdominal: Soft. He exhibits no distension. There is no tenderness.   Musculoskeletal: He exhibits no edema or tenderness.   Lymphadenopathy:     He has no cervical adenopathy.   Neurological: He is " alert and oriented to person, place, and time. He has normal strength. No sensory deficit. Gait normal.   Reflex Scores:       Brachioradialis reflexes are 2+ on the right side and 2+ on the left side.       Patellar reflexes are 2+ on the right side and 2+ on the left side.  Skin: Skin is warm and dry.   Psychiatric: He has a normal mood and affect. His behavior is normal.       Assessment:       1. Annual physical exam    2. Left sided ulcerative colitis without complication    3. Screening for colorectal cancer    4. Need for pneumococcal vaccination    5. Essential hypertension    6. Prostate cancer    7. HIV (human immunodeficiency virus infection)        Plan:       Diagnoses and all orders for this visit:    Annual physical exam  -     Comprehensive metabolic panel; Future  -     Lipid panel; Future  -     TSH; Future  -     CBC auto differential; Future  -     Hemoglobin A1c; Future    Left sided ulcerative colitis without complication  -     Vitamin B12; Future  -     Ferritin; Future  -     Iron and TIBC; Future    Screening for colorectal cancer   Urged pt to get this done due to increased risk    Need for pneumococcal vaccination    Essential hypertension   controlled. Continue current regimen    Prostate cancer   F/u with urology  HIV (human immunodeficiency virus infection)  RI for ID MD Ortega labs to ensure CD4 at goal  -     cyanocobalamin (VITAMIN B-12) 1000 MCG tablet; Take 1 tablet (1,000 mcg total) by mouth once daily.         RTC in 6 months or sooner prn           Side effects of medication(s) were discussed in detail and patient voiced understanding.  Patient will call back for any issues or complications.

## 2019-02-08 ENCOUNTER — IMMUNIZATION (OUTPATIENT)
Dept: PHARMACY | Facility: CLINIC | Age: 79
End: 2019-02-08
Payer: COMMERCIAL

## 2019-02-08 RX ORDER — FERROUS SULFATE 324(65)MG
325 TABLET, DELAYED RELEASE (ENTERIC COATED) ORAL 2 TIMES DAILY
Qty: 180 TABLET | Refills: 3 | Status: SHIPPED | OUTPATIENT
Start: 2019-02-08 | End: 2019-08-26

## 2019-03-02 RX ORDER — ATORVASTATIN CALCIUM 40 MG/1
TABLET, FILM COATED ORAL
Qty: 90 TABLET | Refills: 0 | Status: CANCELLED | OUTPATIENT
Start: 2019-03-02

## 2019-03-04 DIAGNOSIS — I10 ESSENTIAL HYPERTENSION: Primary | ICD-10-CM

## 2019-03-04 RX ORDER — ATORVASTATIN CALCIUM 40 MG/1
TABLET, FILM COATED ORAL
Qty: 90 TABLET | Refills: 0 | Status: SHIPPED | OUTPATIENT
Start: 2019-03-04 | End: 2019-10-14 | Stop reason: SDUPTHER

## 2019-03-28 ENCOUNTER — OFFICE VISIT (OUTPATIENT)
Dept: INTERNAL MEDICINE | Facility: CLINIC | Age: 79
End: 2019-03-28
Attending: INTERNAL MEDICINE
Payer: COMMERCIAL

## 2019-03-28 VITALS
BODY MASS INDEX: 24.59 KG/M2 | SYSTOLIC BLOOD PRESSURE: 128 MMHG | HEIGHT: 62 IN | OXYGEN SATURATION: 95 % | DIASTOLIC BLOOD PRESSURE: 78 MMHG | HEART RATE: 81 BPM | WEIGHT: 133.63 LBS

## 2019-03-28 DIAGNOSIS — F03.90 DEMENTIA WITHOUT BEHAVIORAL DISTURBANCE, UNSPECIFIED DEMENTIA TYPE: Primary | ICD-10-CM

## 2019-03-28 DIAGNOSIS — C61 PROSTATE CANCER: ICD-10-CM

## 2019-03-28 DIAGNOSIS — B20 HIV (HUMAN IMMUNODEFICIENCY VIRUS INFECTION): ICD-10-CM

## 2019-03-28 DIAGNOSIS — I25.10 CAD IN NATIVE ARTERY: ICD-10-CM

## 2019-03-28 DIAGNOSIS — K51.90 ULCERATIVE COLITIS WITHOUT COMPLICATIONS, UNSPECIFIED LOCATION: ICD-10-CM

## 2019-03-28 PROCEDURE — 1101F PT FALLS ASSESS-DOCD LE1/YR: CPT | Mod: CPTII,S$GLB,, | Performed by: INTERNAL MEDICINE

## 2019-03-28 PROCEDURE — 3078F PR MOST RECENT DIASTOLIC BLOOD PRESSURE < 80 MM HG: ICD-10-PCS | Mod: CPTII,S$GLB,, | Performed by: INTERNAL MEDICINE

## 2019-03-28 PROCEDURE — 3078F DIAST BP <80 MM HG: CPT | Mod: CPTII,S$GLB,, | Performed by: INTERNAL MEDICINE

## 2019-03-28 PROCEDURE — 99214 PR OFFICE/OUTPT VISIT, EST, LEVL IV, 30-39 MIN: ICD-10-PCS | Mod: S$GLB,,, | Performed by: INTERNAL MEDICINE

## 2019-03-28 PROCEDURE — 3074F SYST BP LT 130 MM HG: CPT | Mod: CPTII,S$GLB,, | Performed by: INTERNAL MEDICINE

## 2019-03-28 PROCEDURE — 1101F PR PT FALLS ASSESS DOC 0-1 FALLS W/OUT INJ PAST YR: ICD-10-PCS | Mod: CPTII,S$GLB,, | Performed by: INTERNAL MEDICINE

## 2019-03-28 PROCEDURE — 3074F PR MOST RECENT SYSTOLIC BLOOD PRESSURE < 130 MM HG: ICD-10-PCS | Mod: CPTII,S$GLB,, | Performed by: INTERNAL MEDICINE

## 2019-03-28 PROCEDURE — 99214 OFFICE O/P EST MOD 30 MIN: CPT | Mod: S$GLB,,, | Performed by: INTERNAL MEDICINE

## 2019-03-28 PROCEDURE — 99999 PR PBB SHADOW E&M-EST. PATIENT-LVL III: CPT | Mod: PBBFAC,,, | Performed by: INTERNAL MEDICINE

## 2019-03-28 PROCEDURE — 99999 PR PBB SHADOW E&M-EST. PATIENT-LVL III: ICD-10-PCS | Mod: PBBFAC,,, | Performed by: INTERNAL MEDICINE

## 2019-03-28 NOTE — PROGRESS NOTES
"Subjective:       Patient ID: Aroldo Johnston Jr. is a 78 y.o. male.    Chief Complaint: Follow-up    Here for routine f/u    'want to check on my health. I am not taking medications.     Pt with worsening dementia, accompanied by himself today. He does not recall his chronic medical conditions of HIV, UC, prostate CA. I will contact his sister and get up to date on his current living situation. His only compaint today is frequent loose stools.          Review of Systems   Unable to perform ROS: Dementia       Objective:      Vitals:    03/28/19 1428   BP: 128/78   Pulse: 81   SpO2: 95%   Weight: 60.6 kg (133 lb 9.6 oz)   Height: 5' 2" (1.575 m)      Physical Exam   Constitutional: He is oriented to person, place, and time. He appears well-developed and well-nourished. No distress.   HENT:   Head: Normocephalic and atraumatic.   Mouth/Throat: Oropharynx is clear and moist. No oropharyngeal exudate.   Eyes: Pupils are equal, round, and reactive to light. Conjunctivae and EOM are normal. No scleral icterus.   Neck: No thyromegaly present.   Cardiovascular: Normal rate, regular rhythm and normal heart sounds.   No murmur heard.  Pulmonary/Chest: Effort normal and breath sounds normal. He has no wheezes. He has no rales.   Abdominal: Soft. He exhibits no distension. There is no tenderness.   Musculoskeletal: He exhibits no edema or tenderness.   Lymphadenopathy:     He has no cervical adenopathy.   Neurological: He is alert and oriented to person, place, and time.   Skin: Skin is warm and dry.   Psychiatric: He has a normal mood and affect. His behavior is normal. His mood appears not anxious. His affect is not angry. His speech is not slurred. He is not agitated and not aggressive. Cognition and memory are impaired. He exhibits abnormal recent memory and abnormal remote memory.       Assessment:       1. Dementia without behavioral disturbance, unspecified dementia type    2. HIV (human immunodeficiency virus " infection)    3. Ulcerative colitis without complications, unspecified location    4. CAD in native artery    5. Prostate cancer        Plan:       Aroldo was seen today for follow-up.    Diagnoses and all orders for this visit:    Dementia without behavioral disturbance, unspecified dementia type   Will contact sister as she was previously administering meds and working on placement for pt. Pt may be taking meds. Will get story.    HIV (human immunodeficiency virus infection)    Ulcerative colitis without complications, unspecified location     CAD in native artery    Prostate cancer                 Side effects of medication(s) were discussed in detail and patient voiced understanding.  Patient will call back for any issues or complications.

## 2019-05-06 ENCOUNTER — PATIENT MESSAGE (OUTPATIENT)
Dept: NEUROLOGY | Facility: CLINIC | Age: 79
End: 2019-05-06

## 2019-05-07 ENCOUNTER — TELEPHONE (OUTPATIENT)
Dept: NEUROLOGY | Facility: CLINIC | Age: 79
End: 2019-05-07

## 2019-05-22 DIAGNOSIS — F03.91 DEMENTIA WITH BEHAVIORAL DISTURBANCE, UNSPECIFIED DEMENTIA TYPE: Primary | ICD-10-CM

## 2019-05-22 DIAGNOSIS — B20 HIV (HUMAN IMMUNODEFICIENCY VIRUS INFECTION): ICD-10-CM

## 2019-06-03 ENCOUNTER — PATIENT MESSAGE (OUTPATIENT)
Dept: NEUROLOGY | Facility: CLINIC | Age: 79
End: 2019-06-03

## 2019-06-04 ENCOUNTER — TELEPHONE (OUTPATIENT)
Dept: NEUROLOGY | Facility: CLINIC | Age: 79
End: 2019-06-04

## 2019-06-12 ENCOUNTER — LAB VISIT (OUTPATIENT)
Dept: LAB | Facility: OTHER | Age: 79
End: 2019-06-12
Attending: INTERNAL MEDICINE
Payer: COMMERCIAL

## 2019-06-12 ENCOUNTER — OFFICE VISIT (OUTPATIENT)
Dept: INTERNAL MEDICINE | Facility: CLINIC | Age: 79
End: 2019-06-12
Attending: INTERNAL MEDICINE
Payer: COMMERCIAL

## 2019-06-12 VITALS
OXYGEN SATURATION: 95 % | DIASTOLIC BLOOD PRESSURE: 70 MMHG | WEIGHT: 125.88 LBS | HEIGHT: 63 IN | BODY MASS INDEX: 22.3 KG/M2 | HEART RATE: 88 BPM | SYSTOLIC BLOOD PRESSURE: 122 MMHG

## 2019-06-12 DIAGNOSIS — R29.898 LEG WEAKNESS, BILATERAL: ICD-10-CM

## 2019-06-12 DIAGNOSIS — F03.91 DEMENTIA WITH BEHAVIORAL DISTURBANCE, UNSPECIFIED DEMENTIA TYPE: Primary | ICD-10-CM

## 2019-06-12 DIAGNOSIS — I10 ESSENTIAL HYPERTENSION: ICD-10-CM

## 2019-06-12 DIAGNOSIS — E53.8 B12 DEFICIENCY: ICD-10-CM

## 2019-06-12 DIAGNOSIS — B20 HIV (HUMAN IMMUNODEFICIENCY VIRUS INFECTION): ICD-10-CM

## 2019-06-12 LAB
ALBUMIN SERPL BCP-MCNC: 3.2 G/DL (ref 3.5–5.2)
ALP SERPL-CCNC: 89 U/L (ref 55–135)
ALT SERPL W/O P-5'-P-CCNC: 24 U/L (ref 10–44)
ANION GAP SERPL CALC-SCNC: 5 MMOL/L (ref 8–16)
AST SERPL-CCNC: 34 U/L (ref 10–40)
BILIRUB SERPL-MCNC: 0.7 MG/DL (ref 0.1–1)
BUN SERPL-MCNC: 6 MG/DL (ref 8–23)
CALCIUM SERPL-MCNC: 8.5 MG/DL (ref 8.7–10.5)
CHLORIDE SERPL-SCNC: 104 MMOL/L (ref 95–110)
CO2 SERPL-SCNC: 28 MMOL/L (ref 23–29)
CREAT SERPL-MCNC: 1.1 MG/DL (ref 0.5–1.4)
EST. GFR  (AFRICAN AMERICAN): >60 ML/MIN/1.73 M^2
EST. GFR  (NON AFRICAN AMERICAN): >60 ML/MIN/1.73 M^2
GLUCOSE SERPL-MCNC: 105 MG/DL (ref 70–110)
POTASSIUM SERPL-SCNC: 3.3 MMOL/L (ref 3.5–5.1)
PROT SERPL-MCNC: 7.8 G/DL (ref 6–8.4)
SODIUM SERPL-SCNC: 137 MMOL/L (ref 136–145)
VIT B12 SERPL-MCNC: 532 PG/ML (ref 210–950)

## 2019-06-12 PROCEDURE — 3074F SYST BP LT 130 MM HG: CPT | Mod: CPTII,S$GLB,, | Performed by: INTERNAL MEDICINE

## 2019-06-12 PROCEDURE — 99999 PR PBB SHADOW E&M-EST. PATIENT-LVL III: CPT | Mod: PBBFAC,,, | Performed by: INTERNAL MEDICINE

## 2019-06-12 PROCEDURE — 80053 COMPREHEN METABOLIC PANEL: CPT

## 2019-06-12 PROCEDURE — 3078F DIAST BP <80 MM HG: CPT | Mod: CPTII,S$GLB,, | Performed by: INTERNAL MEDICINE

## 2019-06-12 PROCEDURE — 36415 COLL VENOUS BLD VENIPUNCTURE: CPT

## 2019-06-12 PROCEDURE — 99999 PR PBB SHADOW E&M-EST. PATIENT-LVL III: ICD-10-PCS | Mod: PBBFAC,,, | Performed by: INTERNAL MEDICINE

## 2019-06-12 PROCEDURE — 86361 T CELL ABSOLUTE COUNT: CPT

## 2019-06-12 PROCEDURE — 99214 PR OFFICE/OUTPT VISIT, EST, LEVL IV, 30-39 MIN: ICD-10-PCS | Mod: S$GLB,,, | Performed by: INTERNAL MEDICINE

## 2019-06-12 PROCEDURE — 82607 VITAMIN B-12: CPT

## 2019-06-12 PROCEDURE — 99214 OFFICE O/P EST MOD 30 MIN: CPT | Mod: S$GLB,,, | Performed by: INTERNAL MEDICINE

## 2019-06-12 PROCEDURE — 3078F PR MOST RECENT DIASTOLIC BLOOD PRESSURE < 80 MM HG: ICD-10-PCS | Mod: CPTII,S$GLB,, | Performed by: INTERNAL MEDICINE

## 2019-06-12 PROCEDURE — 1101F PR PT FALLS ASSESS DOC 0-1 FALLS W/OUT INJ PAST YR: ICD-10-PCS | Mod: CPTII,S$GLB,, | Performed by: INTERNAL MEDICINE

## 2019-06-12 PROCEDURE — 87536 HIV-1 QUANT&REVRSE TRNSCRPJ: CPT

## 2019-06-12 PROCEDURE — 1101F PT FALLS ASSESS-DOCD LE1/YR: CPT | Mod: CPTII,S$GLB,, | Performed by: INTERNAL MEDICINE

## 2019-06-12 PROCEDURE — 3074F PR MOST RECENT SYSTOLIC BLOOD PRESSURE < 130 MM HG: ICD-10-PCS | Mod: CPTII,S$GLB,, | Performed by: INTERNAL MEDICINE

## 2019-06-12 NOTE — PROGRESS NOTES
"Subjective:       Patient ID: Aroldo Johnston Jr. is a 78 y.o. male.    Chief Complaint: Follow-up and Dementia    Here for urgent visit    Here today to endorse bilateral leg symptoms described as a slight weakness. He has been endorsing this for past 1.5-2 yrs now. He does not recall talking with me about this. He is aware he has some memory difficulties but is not aware of their severity and their progression. He is not taking any meds. He has not seen his ID MD in several months.      Review of Systems   Unable to perform ROS: Dementia       Objective:      Vitals:    06/12/19 1254   BP: 122/70   Pulse: 88   SpO2: 95%   Weight: 57.1 kg (125 lb 14.1 oz)   Height: 5' 3" (1.6 m)      Physical Exam   Constitutional: He is oriented to person, place, and time. He appears well-developed and well-nourished. No distress.   HENT:   Head: Normocephalic and atraumatic.   Mouth/Throat: Oropharynx is clear and moist. No oropharyngeal exudate.   Eyes: Pupils are equal, round, and reactive to light. Conjunctivae and EOM are normal. No scleral icterus.   Neck: No thyromegaly present.   Cardiovascular: Normal rate, regular rhythm and normal heart sounds.   No murmur heard.  Pulmonary/Chest: Effort normal and breath sounds normal. He has no wheezes. He has no rales.   Abdominal: Soft. He exhibits no distension. There is no tenderness.   Musculoskeletal: He exhibits no edema or tenderness.   Lymphadenopathy:     He has no cervical adenopathy.   Neurological: He is alert and oriented to person, place, and time.   Skin: Skin is warm and dry.   Psychiatric: He has a normal mood and affect. His behavior is normal.       Assessment:       1. Dementia with behavioral disturbance, unspecified dementia type    2. Essential hypertension    3. HIV (human immunodeficiency virus infection)    4. Leg weakness, bilateral    5. B12 deficiency        Plan:       Aroldo was seen today for follow-up and dementia.    Diagnoses and all orders for " this visit:    Dementia with behavioral disturbance, unspecified dementia type   Have recently discussed pt with his sister. She is working on resources for possible placement as caring for him is more and more difficult.    Essential hypertension   Denies frequent or current HA, intermittent blurry vision, dizziness, CP, or SOB.    HIV (human immunodeficiency virus infection)  -     T-HELPER CELLS (CD4) COUNT; Future  -     HIV RNA, QUANTITATIVE, PCR; Future    Leg weakness, bilateral   -no evidence on exam yet. Will monitor. Check CD4 count and if low need to r/o central infectious like toxo  -     Vitamin B12; Future  -     Comprehensive metabolic panel; Future    B12 deficiency           Cory Pena MD  Internal Medicine-[facility]        Side effects of medication(s) were discussed in detail and patient voiced understanding.  Patient will call back for any issues or complications.

## 2019-06-13 ENCOUNTER — TELEPHONE (OUTPATIENT)
Dept: INTERNAL MEDICINE | Facility: CLINIC | Age: 79
End: 2019-06-13

## 2019-06-13 DIAGNOSIS — E87.6 HYPOKALEMIA: Primary | ICD-10-CM

## 2019-06-13 LAB
CD3+CD4+ CELLS # BLD: 612 CELLS/UL (ref 300–1400)
CD3+CD4+ CELLS NFR BLD: 17 % (ref 28–57)

## 2019-06-13 NOTE — TELEPHONE ENCOUNTER
Spoke to pt and told him his k is low and needs to increased potassium in diet as MD advised and scheduled k lab 06/20  Mailing out MD advise and lab

## 2019-06-13 NOTE — LETTER
June 13, 2019             [facility] [place] [address] [facility] [address]  [address]  [facility] [address]  Phone: [phone]  Fax: [phone] June 13, 2019       Patient: Aroldo Johnston Jr.   YOB: 1940           To Whom It May Concern:    Please call patient potassium is low   This should be easily corrected by increasing the amount of potassium in your diet--bananas, oranges, potatoes, spinach, tomatoes, etc. You may also look up on the internet for other options.please schedule recheck in one week    Sincerely,        Mendy Muñiz MD

## 2019-06-13 NOTE — TELEPHONE ENCOUNTER
Please call patient potassium is low   This should be easily corrected by increasing the amount of potassium in your diet--bananas, oranges, potatoes, spinach, tomatoes, etc. You may also look up on the internet for other options.please schedule recheck in one week

## 2019-06-14 LAB
HIV UQ DATE RECEIVED: ABNORMAL
HIV UQ DATE REPORTED: ABNORMAL
HIV1 RNA # SERPL NAA+PROBE: ABNORMAL COPIES/ML
HIV1 RNA SERPL NAA+PROBE-LOG#: 3.63 LOG (10) COPIES/ML
HIV1 RNA SERPL QL NAA+PROBE: DETECTED

## 2019-06-25 ENCOUNTER — PATIENT MESSAGE (OUTPATIENT)
Dept: NEUROLOGY | Facility: CLINIC | Age: 79
End: 2019-06-25

## 2019-06-27 ENCOUNTER — TELEPHONE (OUTPATIENT)
Dept: NEUROLOGY | Facility: CLINIC | Age: 79
End: 2019-06-27

## 2019-06-29 ENCOUNTER — NURSE TRIAGE (OUTPATIENT)
Dept: ADMINISTRATIVE | Facility: CLINIC | Age: 79
End: 2019-06-29

## 2019-06-29 NOTE — TELEPHONE ENCOUNTER
Pt needed a refill of remeron, spoke to Dr. Teddy Case and she gave me a verbal order to refill it, I called it in to [facility] [address], called pt back and informed him

## 2019-06-30 NOTE — TELEPHONE ENCOUNTER
Reason for Disposition   Caller requesting a refill, no triage required, and triager able to refill per unit policy    Protocols used: MEDICATION QUESTION CALL-A-AH    Spoke to another triager already and no other needs at this time.

## 2019-07-01 DIAGNOSIS — F41.8 DEPRESSION WITH ANXIETY: ICD-10-CM

## 2019-07-01 NOTE — TELEPHONE ENCOUNTER
----- Message from Sophia Bautisat sent at 7/1/2019 11:46 AM CDT -----  Contact: Pt  Can the clinic reply in [facility]:No      Please refill the medication(s) listed below. Please call the patient when the prescription(s) is ready for  at the phone number: [phone]    Medication #1:mirtazapine (REMERON) 15 MG tablet    Medication #2:      Preferred Pharmacy:[facility] [address] [place] [address] [address]  [facility]

## 2019-07-02 RX ORDER — MIRTAZAPINE 15 MG/1
15 TABLET, FILM COATED ORAL NIGHTLY
Qty: 60 TABLET | Refills: 2 | Status: SHIPPED | OUTPATIENT
Start: 2019-07-02 | End: 2019-08-26

## 2019-07-09 ENCOUNTER — SSC ENCOUNTER (OUTPATIENT)
Dept: ADMINISTRATIVE | Facility: OTHER | Age: 79
End: 2019-07-09

## 2019-07-09 NOTE — PROGRESS NOTES
Please note the following patient's information has been forwarded to [facility] case management department.    Please see the media section in patient's chart for additional details.    Please contact Outpatient Complex care Management at ext [phone] with any questions.    Thank you,    Viri Quintana    Outpatient Case Management

## 2019-07-15 ENCOUNTER — TELEPHONE (OUTPATIENT)
Dept: INTERNAL MEDICINE | Facility: CLINIC | Age: 79
End: 2019-07-15

## 2019-07-15 NOTE — TELEPHONE ENCOUNTER
Attempted to call Mr. Johnston wife back, but Mr. Johnston states that she is not home.  Mr. Johnston asked when next appt.  Informed that he had a appt on July 22, 2019.  Instructed patient to have wife call the office when she get home.

## 2019-07-16 ENCOUNTER — TELEPHONE (OUTPATIENT)
Dept: INTERNAL MEDICINE | Facility: CLINIC | Age: 79
End: 2019-07-16

## 2019-07-17 ENCOUNTER — TELEPHONE (OUTPATIENT)
Dept: INTERNAL MEDICINE | Facility: CLINIC | Age: 79
End: 2019-07-17

## 2019-07-17 DIAGNOSIS — F03.91 DEMENTIA WITH BEHAVIORAL DISTURBANCE, UNSPECIFIED DEMENTIA TYPE: Primary | ICD-10-CM

## 2019-07-17 NOTE — TELEPHONE ENCOUNTER
----- Message from Patricia Ferreira sent at 7/16/2019 10:25 AM CDT -----  Contact: Elizabeth(sister)  Name of Who is Calling: Elizabeth(sister)    What is the request in detail: Elizabeth(sister) is checking the status of a case management referral.....Please contact to further discuss and advise      Can the clinic reply by [facility]: No     What Number to Call Back if not in [facility]: [phone]

## 2019-07-18 ENCOUNTER — SSC ENCOUNTER (OUTPATIENT)
Dept: ADMINISTRATIVE | Facility: OTHER | Age: 79
End: 2019-07-18

## 2019-07-18 NOTE — PROGRESS NOTES
Please note the following patient's information has been forwarded to [facility] case management department on 07/18/2019.    Please see the media section in patient's chart for additional details.    Please contact Outpatient Complex care Management at ext [phone] with any questions.    Thank you,    Rolanda Noe, [facility]  Outpatient Case Mgmnt  [phone]

## 2019-07-21 ENCOUNTER — PATIENT OUTREACH (OUTPATIENT)
Dept: ADMINISTRATIVE | Facility: OTHER | Age: 79
End: 2019-07-21

## 2019-07-22 ENCOUNTER — TELEPHONE (OUTPATIENT)
Dept: UROLOGY | Facility: CLINIC | Age: 79
End: 2019-07-22

## 2019-07-22 NOTE — TELEPHONE ENCOUNTER
----- Message from Shailesh Stoddard sent at 7/22/2019 12:10 PM CDT -----  Contact: Elizabeth nguyễn   Name of Who is Calling:Elizabeth nguyễn     What is the request in detail:Elizabeth nguyễn is requesting a call back regarding the pt didn't get his recent PSA lab and the pt sister is asking should she still bring him to be seen today..... Please contact to further discuss and advise      Can the clinic reply by [facility]: No     What [facility]: [phone]   :

## 2019-07-24 ENCOUNTER — TELEPHONE (OUTPATIENT)
Dept: INTERNAL MEDICINE | Facility: CLINIC | Age: 79
End: 2019-07-24

## 2019-07-24 NOTE — TELEPHONE ENCOUNTER
Spoke to Mr. Preston sister (Caroline)  And informed her that Dr. Gardner states that if pt is unable to walk, this is completely new and he needs to be evaluated in the ED for possible management.  Patient sister states understanding with no further questions.

## 2019-07-24 NOTE — TELEPHONE ENCOUNTER
If pt is unable to walk this is completely new and he needs to be evaluated in ED for possible management

## 2019-08-08 ENCOUNTER — PATIENT OUTREACH (OUTPATIENT)
Dept: ADMINISTRATIVE | Facility: OTHER | Age: 79
End: 2019-08-08

## 2019-08-12 ENCOUNTER — LAB VISIT (OUTPATIENT)
Dept: LAB | Facility: OTHER | Age: 79
End: 2019-08-12
Attending: UROLOGY
Payer: COMMERCIAL

## 2019-08-12 DIAGNOSIS — R97.20 ELEVATED PSA: ICD-10-CM

## 2019-08-12 LAB — COMPLEXED PSA SERPL-MCNC: 8.2 NG/ML (ref 0–4)

## 2019-08-12 PROCEDURE — 36415 COLL VENOUS BLD VENIPUNCTURE: CPT

## 2019-08-12 PROCEDURE — 84153 ASSAY OF PSA TOTAL: CPT

## 2019-08-20 DIAGNOSIS — F41.8 DEPRESSION WITH ANXIETY: ICD-10-CM

## 2019-08-20 RX ORDER — MIRTAZAPINE 15 MG/1
TABLET, FILM COATED ORAL
Qty: 30 TABLET | Refills: 0 | OUTPATIENT
Start: 2019-08-20

## 2019-08-26 ENCOUNTER — OFFICE VISIT (OUTPATIENT)
Dept: UROLOGY | Facility: CLINIC | Age: 79
End: 2019-08-26
Attending: UROLOGY
Payer: COMMERCIAL

## 2019-08-26 ENCOUNTER — PATIENT OUTREACH (OUTPATIENT)
Dept: ADMINISTRATIVE | Facility: OTHER | Age: 79
End: 2019-08-26

## 2019-08-26 VITALS
WEIGHT: 125.88 LBS | DIASTOLIC BLOOD PRESSURE: 78 MMHG | HEART RATE: 69 BPM | SYSTOLIC BLOOD PRESSURE: 136 MMHG | HEIGHT: 63 IN | BODY MASS INDEX: 22.3 KG/M2

## 2019-08-26 DIAGNOSIS — N13.30 HYDRONEPHROSIS, LEFT: ICD-10-CM

## 2019-08-26 DIAGNOSIS — N48.1 BALANITIS: ICD-10-CM

## 2019-08-26 DIAGNOSIS — R97.20 ELEVATED PSA: ICD-10-CM

## 2019-08-26 DIAGNOSIS — C61 PROSTATE CANCER: Primary | ICD-10-CM

## 2019-08-26 PROCEDURE — 99214 PR OFFICE/OUTPT VISIT, EST, LEVL IV, 30-39 MIN: ICD-10-PCS | Mod: S$GLB,,, | Performed by: UROLOGY

## 2019-08-26 PROCEDURE — 3075F PR MOST RECENT SYSTOLIC BLOOD PRESS GE 130-139MM HG: ICD-10-PCS | Mod: CPTII,S$GLB,, | Performed by: UROLOGY

## 2019-08-26 PROCEDURE — 3078F PR MOST RECENT DIASTOLIC BLOOD PRESSURE < 80 MM HG: ICD-10-PCS | Mod: CPTII,S$GLB,, | Performed by: UROLOGY

## 2019-08-26 PROCEDURE — 1101F PR PT FALLS ASSESS DOC 0-1 FALLS W/OUT INJ PAST YR: ICD-10-PCS | Mod: CPTII,S$GLB,, | Performed by: UROLOGY

## 2019-08-26 PROCEDURE — 3075F SYST BP GE 130 - 139MM HG: CPT | Mod: CPTII,S$GLB,, | Performed by: UROLOGY

## 2019-08-26 PROCEDURE — 99214 OFFICE O/P EST MOD 30 MIN: CPT | Mod: S$GLB,,, | Performed by: UROLOGY

## 2019-08-26 PROCEDURE — 1101F PT FALLS ASSESS-DOCD LE1/YR: CPT | Mod: CPTII,S$GLB,, | Performed by: UROLOGY

## 2019-08-26 PROCEDURE — 3078F DIAST BP <80 MM HG: CPT | Mod: CPTII,S$GLB,, | Performed by: UROLOGY

## 2019-08-26 RX ORDER — FERROUS SULFATE 325(65) MG
TABLET ORAL
Refills: 3 | COMMUNITY
Start: 2019-07-19 | End: 2020-03-05

## 2019-08-26 RX ORDER — EFAVIRENZ, EMTRICITABINE, AND TENOFOVIR DISOPROXIL FUMARATE 600; 200; 300 MG/1; MG/1; MG/1
TABLET, FILM COATED ORAL
Refills: 2 | COMMUNITY
Start: 2019-07-21

## 2019-08-26 RX ORDER — DOLUTEGRAVIR SODIUM AND RILPIVIRINE HYDROCHLORIDE 50; 25 MG/1; MG/1
TABLET, FILM COATED ORAL
Refills: 1 | COMMUNITY
Start: 2019-08-08

## 2019-08-26 NOTE — PROGRESS NOTES
"Subjective:      Aroldo Johnston Jr. is a 78 y.o. male who returns today regarding his prostate cancer, UPJ obstruction, balanitis, and stones.    Prior  history as per below. Here to review PSA.    Patient reports urinary frequency, approximately every 30 minutes while awake. No nocturia, dysuria, hematuria, flank pain, intermittency, hesitancy, incomplete empyting, weak stream, or straining to void.     He does not pull his foreskin back to wash his penis.     Prostate Cancer  He states that he was [place] [facility]; pT1c, Alexia 3+3=6, PSA 16.5.  He states he was treated with radiation therapy with no known recurrence.  PSA sachin 0.97 in March 2009.  PSA slowly began to rise in 2011 and then more rapidly to 4.9 in Aug 2015, when he was referred to me.  Began ADT in 2016 and later opted for intermittent therapy. Last injection 3/3/16. PSA has been slowly increasing, now 8.2 from 6.1 in 01/2019.    UPJ Obstruction  He was diagnosed w/ left UPJ obstruction in 2014 after presenting with large renal stone.  He had robotic pyeloplasty in August 2014.  He did not FU for stent removal, which was noted on CT in September 2015.  Stent exchanged in OR on 9/24/15 and subsequently removed in clinic.  FU US demonstrated stable, likely chronic hydronephrosis. Cr is within normal limits.     The following portions of the patient's history were reviewed and updated as appropriate: allergies, current medications, past family history, past medical history, past social history, past surgical history and problem list.    Review of Systems  A comprehensive multipoint review of systems was negative except as otherwise stated in the HPI.     Objective:   Vitals:   /78 (BP Location: Left arm, Patient Position: Sitting, BP Method: Large (Automatic))   Pulse 69   Ht 5' 3" (1.6 m)   Wt 57.1 kg (125 lb 14.1 oz)   BMI 22.30 kg/m²     Physical Exam   General: alert and oriented, no acute " distress  Respiratory: Symmetric expansion, non-labored breathing  Abdomen: soft, nontender, nondistended  : uncircumcised penis; mild balanitis, smegma present; testicular and scrotal exam normal; prostate with expected post-radiation changes  Neuro: no gross deficits; poor memory  Psych: normal judgment and insight, normal mood/affect and non-anxious    Lab Review   Component PSA DIAGNOSTIC   Latest Ref Rng & Units 0.00 - 4.00 ng/mL   1/7/2019 6.1 (H)   6/25/2018 6.5 (H)   3/22/2018 6.4 (H)   9/18/2018 2.8   3/20/2017 0.86   9/16/2016 0.03   7/1/2016 0.05   3/29/2016 0.31   1/6/2016 8.0 (H)       Assessment and Plan:   Balanitis  -- encourage adequate hygiene    Prostate cancer  Elevated PSA  -- PSA continues to rise  -- Intermittent ADT as indicated - plan for PSA > 10    Hydronephrosis, left  -- Stable s/p left pyeloplasty    FU 6 mos w/ PSA

## 2019-09-16 ENCOUNTER — SSC ENCOUNTER (OUTPATIENT)
Dept: ADMINISTRATIVE | Facility: OTHER | Age: 79
End: 2019-09-16

## 2019-09-16 NOTE — PROGRESS NOTES
"Please see the below information from [facility] regarding case management referral      **successful outreach by nurse**  member actively engaging with nurse at time of referral    07/18/2019: Attempt 1, 1:04 PM, left message/voicemail  07/23/2019: Nurse spoke with member who agreed to health assessment, although displaying reservation/reluctancy to answering questions. Nurse offered to call member at another time. Member insisted now was a good time. Member states not ready to decide if he wants to enroll in program. Member reports he does not check blood pressure and last recorded blood pressure was at PCP appointment on 06/12/2019, b/p=122/70 per MDI. Member states he was not given any diet restrictions and does not watch sodium intake. Member states he walks with a cane and has concerns for falls although refusing to state why he has concerns. Member states labs drawn at last PCP visit. Member refused to reconcile medications. Member ended call abruptly before completing assessment  stating "I have to get off the phone now" and hung up.   08/14/2019: Attempt 1, 4:00 PM, left message/voicemail  08/19/2019: Nurse reached member who stated "I don't have time for this and hung up."    **follow up with nurse scheduled for 09/09/2019**      Thank you,  Rolanda Noe, [facility]  Outpatient Case Mgmnt  [phone]    "

## 2019-10-02 ENCOUNTER — TELEPHONE (OUTPATIENT)
Dept: INTERNAL MEDICINE | Facility: CLINIC | Age: 79
End: 2019-10-02

## 2019-10-02 DIAGNOSIS — F03.91 DEMENTIA WITH BEHAVIORAL DISTURBANCE, UNSPECIFIED DEMENTIA TYPE: Primary | ICD-10-CM

## 2019-10-14 ENCOUNTER — TELEPHONE (OUTPATIENT)
Dept: NEUROLOGY | Facility: CLINIC | Age: 79
End: 2019-10-14

## 2019-10-14 DIAGNOSIS — I10 ESSENTIAL HYPERTENSION: ICD-10-CM

## 2019-10-14 RX ORDER — ATORVASTATIN CALCIUM 40 MG/1
TABLET, FILM COATED ORAL
Qty: 90 TABLET | Refills: 3 | Status: SHIPPED | OUTPATIENT
Start: 2019-10-14 | End: 2020-11-11 | Stop reason: SDUPTHER

## 2019-10-14 NOTE — TELEPHONE ENCOUNTER
----- Message from Sejal Hess sent at 10/11/2019  6:27 PM CDT -----  Contact: [phone]  Pt is requesting sooner appointment than Dec 19,2019 appointment,Please call and advise          Thank you

## 2019-10-24 RX ORDER — MIRTAZAPINE 15 MG/1
TABLET, FILM COATED ORAL
Qty: 60 TABLET | Refills: 0 | Status: SHIPPED | OUTPATIENT
Start: 2019-10-24 | End: 2019-12-09 | Stop reason: SDUPTHER

## 2019-10-24 NOTE — TELEPHONE ENCOUNTER
----- Message from Ml Sanchez sent at 10/24/2019  1:49 PM CDT -----  Contact: spenser/neuro /[phone]   Name of Who is Calling:     What is the request in detail:  Request call back in reference to referral  For above patient  Please contact to further discuss and advise      Can the clinic reply by [facility]: no     What Number to Call Back if not in [facility]:  spenser/neuro /[phone]

## 2019-11-01 NOTE — TELEPHONE ENCOUNTER
----- Message from Ирина Avila sent at 8/15/2018 10:00 AM CDT -----  Contact: Pt   Name of Who is Calling: SUSAN LIMON JR. [[id number]]    What is the request in detail: Patient is requesting to be seen today for the same appt time he canceled, patient had an appt for today at 11:40. I advised patient of the next available date. Patient denied. Please contact to further discuss and advise      Can the clinic reply by [facility]: No       What Number to Call Back [facility]: [phone]       
Patient has been added back onto the schedule for today at 1120, and notified.  
Statement Selected

## 2019-12-09 RX ORDER — MIRTAZAPINE 15 MG/1
TABLET, FILM COATED ORAL
Qty: 60 TABLET | Refills: 0 | Status: SHIPPED | OUTPATIENT
Start: 2019-12-09 | End: 2020-02-19 | Stop reason: SDUPTHER

## 2019-12-10 ENCOUNTER — TELEPHONE (OUTPATIENT)
Dept: PRIMARY CARE CLINIC | Facility: CLINIC | Age: 79
End: 2019-12-10

## 2019-12-10 NOTE — TELEPHONE ENCOUNTER
----- Message from Cindy Gottlieb sent at 12/10/2019 11:00 AM CST -----  Contact: pt  Name of Who is Calling: Aroldo Johnston Jr.      What is the request in detail: pt states that he needed a refill on his medication on the mirtazapine (REMERON) 15 MG tablet. Advised pt that the medication was called in and now  pt would like to know why it called in the pharmacy. Please contact to further discuss and advise.      Can the clinic reply by [facility]: N       What Number to Call Back if not in [facility]: [phone]

## 2019-12-10 NOTE — TELEPHONE ENCOUNTER
----- Message from Elvira Cartagena sent at 12/10/2019  2:02 PM CST -----  Contact: (Bhavani Silverio)  Name of Who is Calling:(Bhavani Silverio)       What is the request in detail: Pharm is calling to speak with staff regarding a script (mirtazapine (REMERON) 15 MG tablet) that was recently filled for patients . Please call to further assist .      Can the clinic reply by [facility]: N       What Number to Call Back if not in [facility]: [phone]

## 2019-12-10 NOTE — TELEPHONE ENCOUNTER
Spoke to pharmacy and states that Mr. Johnston is in the pharmacy insisting that they refill his medication Remeron states that Dr. Gardner informed him to take two tablets.  Informed the pharmacy that the order states to take 1 tablet at night.  Pharmacy states the they will let the patient know.

## 2020-01-04 ENCOUNTER — PATIENT OUTREACH (OUTPATIENT)
Dept: ADMINISTRATIVE | Facility: OTHER | Age: 80
End: 2020-01-04

## 2020-01-07 ENCOUNTER — OFFICE VISIT (OUTPATIENT)
Dept: NEUROLOGY | Facility: CLINIC | Age: 80
End: 2020-01-07
Payer: COMMERCIAL

## 2020-01-07 ENCOUNTER — LAB VISIT (OUTPATIENT)
Dept: LAB | Facility: HOSPITAL | Age: 80
End: 2020-01-07
Attending: PSYCHIATRY & NEUROLOGY
Payer: COMMERCIAL

## 2020-01-07 VITALS
DIASTOLIC BLOOD PRESSURE: 75 MMHG | HEART RATE: 76 BPM | BODY MASS INDEX: 22.3 KG/M2 | HEIGHT: 63 IN | SYSTOLIC BLOOD PRESSURE: 135 MMHG

## 2020-01-07 DIAGNOSIS — G62.9 NEUROPATHY: Primary | ICD-10-CM

## 2020-01-07 DIAGNOSIS — R26.9 GAIT DISORDER: ICD-10-CM

## 2020-01-07 DIAGNOSIS — G62.9 NEUROPATHY: ICD-10-CM

## 2020-01-07 LAB
CRP SERPL-MCNC: 10.2 MG/L (ref 0–8.2)
ERYTHROCYTE [SEDIMENTATION RATE] IN BLOOD BY WESTERGREN METHOD: 16 MM/HR (ref 0–23)
FOLATE SERPL-MCNC: 8 NG/ML (ref 4–24)
VIT B12 SERPL-MCNC: 792 PG/ML (ref 210–950)

## 2020-01-07 PROCEDURE — 99214 OFFICE O/P EST MOD 30 MIN: CPT | Mod: S$GLB,,, | Performed by: PSYCHIATRY & NEUROLOGY

## 2020-01-07 PROCEDURE — 1159F MED LIST DOCD IN RCRD: CPT | Mod: S$GLB,,, | Performed by: PSYCHIATRY & NEUROLOGY

## 2020-01-07 PROCEDURE — 36415 COLL VENOUS BLD VENIPUNCTURE: CPT

## 2020-01-07 PROCEDURE — 99999 PR PBB SHADOW E&M-EST. PATIENT-LVL III: ICD-10-PCS | Mod: PBBFAC,,, | Performed by: PSYCHIATRY & NEUROLOGY

## 2020-01-07 PROCEDURE — 99999 PR PBB SHADOW E&M-EST. PATIENT-LVL III: CPT | Mod: PBBFAC,,, | Performed by: PSYCHIATRY & NEUROLOGY

## 2020-01-07 PROCEDURE — 82746 ASSAY OF FOLIC ACID SERUM: CPT

## 2020-01-07 PROCEDURE — 99214 PR OFFICE/OUTPT VISIT, EST, LEVL IV, 30-39 MIN: ICD-10-PCS | Mod: S$GLB,,, | Performed by: PSYCHIATRY & NEUROLOGY

## 2020-01-07 PROCEDURE — 85652 RBC SED RATE AUTOMATED: CPT

## 2020-01-07 PROCEDURE — 1101F PR PT FALLS ASSESS DOC 0-1 FALLS W/OUT INJ PAST YR: ICD-10-PCS | Mod: CPTII,S$GLB,, | Performed by: PSYCHIATRY & NEUROLOGY

## 2020-01-07 PROCEDURE — 1126F AMNT PAIN NOTED NONE PRSNT: CPT | Mod: S$GLB,,, | Performed by: PSYCHIATRY & NEUROLOGY

## 2020-01-07 PROCEDURE — 3075F SYST BP GE 130 - 139MM HG: CPT | Mod: CPTII,S$GLB,, | Performed by: PSYCHIATRY & NEUROLOGY

## 2020-01-07 PROCEDURE — 3078F DIAST BP <80 MM HG: CPT | Mod: CPTII,S$GLB,, | Performed by: PSYCHIATRY & NEUROLOGY

## 2020-01-07 PROCEDURE — 1126F PR PAIN SEVERITY QUANTIFIED, NO PAIN PRESENT: ICD-10-PCS | Mod: S$GLB,,, | Performed by: PSYCHIATRY & NEUROLOGY

## 2020-01-07 PROCEDURE — 86140 C-REACTIVE PROTEIN: CPT

## 2020-01-07 PROCEDURE — 3075F PR MOST RECENT SYSTOLIC BLOOD PRESS GE 130-139MM HG: ICD-10-PCS | Mod: CPTII,S$GLB,, | Performed by: PSYCHIATRY & NEUROLOGY

## 2020-01-07 PROCEDURE — 1159F PR MEDICATION LIST DOCUMENTED IN MEDICAL RECORD: ICD-10-PCS | Mod: S$GLB,,, | Performed by: PSYCHIATRY & NEUROLOGY

## 2020-01-07 PROCEDURE — 3078F PR MOST RECENT DIASTOLIC BLOOD PRESSURE < 80 MM HG: ICD-10-PCS | Mod: CPTII,S$GLB,, | Performed by: PSYCHIATRY & NEUROLOGY

## 2020-01-07 PROCEDURE — 1101F PT FALLS ASSESS-DOCD LE1/YR: CPT | Mod: CPTII,S$GLB,, | Performed by: PSYCHIATRY & NEUROLOGY

## 2020-01-07 PROCEDURE — 82607 VITAMIN B-12: CPT

## 2020-01-07 NOTE — PROGRESS NOTES
"[facility]  [facility] - NEUROLOGY  [facility], [place]    Date: January 7, 2020   Patient Name: Aroldo Johnston Jr.   MRN: [id number]   PCP: Cory Gardner  Referring Provider: Cory Gardner MD    Assessment:      This is Aroldo Johnston Jr., 79 y.o. 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 I asked the patient to stop the medication if she notices serious adverse effects as we discussed and to seek immediate medical attention at an ER.     Tino Cintron MD  [facility]   Department of Neurology    Subjective:        HPI:   Mr. Aroldo Johnston Jr. is a 79 y.o. male who presents with a chief complaint of gait disturbance    -  Patient is a difficulty historian and presents with sister who provides much of history and notes that gait difficulty has been ongoing for well over a year.  He has had several near falls and she feels that his stride has shortened.  He reports that his legs will fatigue after about a block.  -  Family has taken control of his medications after he stopped taking HIV meds mid 2019 for no clear reason.  He follows with Dr. Ortega at [facility] with recent viral load undetectable.    Per Kris 8/2018  "He was a by his twin sister who was the primary historian as the patient tended to any problems.  It is to be noted that he was referred to see me late 2017 however missed several appointments.  His sister then took over managing his appointments as patient would not remember them.  The patient lives alone and is able to take care of his day-to-day needs including personal hygiene however his primary difficulty is that of retention of recent memory, remembering conversations he may have had and has had difficulty with word finding.  Symptoms appear to be much more " "pronounced over the past 6 months.  The sister who lives nearby now oversees his medications and helps him keep his appointments.  He continues to drive in the neighborhood but she reports that he has had a couple of close calls recently when he almost met with accidents.  She also reports that he has no insurance as he never did renew it.  The patient does not cook anymore but reports that he eats prepared foods.  He continues to manage his bills and reconcile his checking account.  He has no difficulty with sleep.  He denies any bowel or bladder difficulties or gait imbalance.  He has had back problems and described the age weakness in his extremities. However he has no difficulty with ambulating.  Medical problems include history of HIV."    PAST MEDICAL HISTORY:  Past Medical History:   Diagnosis Date    Anemia     Anxiety     Coronary artery disease     Depression     HIV (human immunodeficiency virus infection)     Hypertension     Inflammatory bowel disease     colitis    Malignant neoplasm of colon, unspecified site     Colon cancer    Malignant neoplasm of colon, unspecified site     Colon cancer    Prostate cancer     STD (sexually transmitted disease)        PAST SURGICAL HISTORY:  Past Surgical History:   Procedure Laterality Date    cardiac stents      COLON SURGERY      NECK SURGERY      radiation         CURRENT MEDS:  Current Outpatient Medications   Medication Sig Dispense Refill    atorvastatin (LIPITOR) 40 MG tablet TAKE 1 TABLET(40 MG) BY MOUTH EVERY DAY 90 tablet 3    ATRIPLA 600-200-300 mg Tab TK 1 T PO D OES  2    ferrous sulfate (FEOSOL) 325 mg (65 mg iron) Tab tablet   3    JULUCA 50-25 mg Tab TK 1 T PO D WAC  1    lamivudine-zidovudine 150-300mg (COMBIVIR) 150-300 mg Tab Take 1 tablet by mouth 2 (two) times daily. 60 tablet 0    mirtazapine (REMERON) 15 MG tablet TAKE 1 TABLET(15 MG) BY MOUTH EVERY EVENING 60 tablet 0     Current Facility-Administered Medications " "  Medication Dose Route Frequency Provider Last Rate Last Dose    cyanocobalamin injection 1,000 mcg  1,000 mcg Intramuscular Once Sofie Foster MD           ALLERGIES:  Review of patient's allergies indicates:  No Known Allergies    FAMILY HISTORY:  Family History   Problem Relation Age of Onset    Colon cancer Paternal Grandmother     Stomach cancer Neg Hx     Rectal cancer Neg Hx     Irritable bowel syndrome Neg Hx     Esophageal cancer Neg Hx     Crohn's disease Neg Hx     Celiac disease Neg Hx        SOCIAL HISTORY:  Social History     Tobacco Use    Smoking status: Former Smoker     Packs/day: 1.00     Years: 10.00     Pack years: 10.00     Types: Cigarettes     Last attempt to quit: 1983     Years since quittin.0    Smokeless tobacco: Never Used   Substance Use Topics    Alcohol use: Yes     Comment: occ drinks    Drug use: No       Review of Systems:  12 review of systems is negative except for the symptoms mentioned in HPI.        Objective:     Vitals:    20 1350   BP: 135/75   Pulse: 76   Height: 5' 3" (1.6 m)       General: NAD, well nourished   Eyes: no tearing, discharge, no erythema   ENT: moist mucous membranes of the oral cavity, nares patent    Neck: Supple, full range of motion  Cardiovascular: Warm and well perfused, pulses equal and symmetrical  Lungs: Normal work of breathing, normal chest wall excursions  Skin: No rash, lesions, or breakdown on exposed skin  Psychiatry: Mood and affect are appropriate   Abdomen: soft, non tender, non distended  Extremeties: No cyanosis, clubbing or edema.    Neurological   MENTAL STATUS: Notable difficulty with short term memory, conversant and cooperative with appropriate affect   CRANIAL NERVES: Visual fields intact. PERRL. EOMI. Facial sensation intact. Face symmetrical. Hearing grossly intact. Full shoulder shrug bilaterally. Tongue protrudes midline   SENSORY: Sensation is decreased to vibration to the knee.  + Romberg.   MOTOR: " Normal bulk and tone. No pronator drift.  5/5 deltoid, biceps, triceps, interosseous, hand  bilaterally. 5/5 iliopsoas, knee extension/flexion, foot dorsi/plantarflexion bilaterally.    REFLEXES: DTR quiet throughout  CEREBELLAR/COORDINATION/GAIT: Gait steady with mild steppage, stride normal although sister feels this is due to conscious effort during exam.

## 2020-01-07 NOTE — PATIENT INSTRUCTIONS
YOU WILL BE CONTACTED BY PHYSICAL THERAPY TO ESTABLISH SESSIONS TO WORK ON BALANCE AND WALKING    FOLLOW UP FOR EMG (PERIPHERAL NERVE TEST)    CONTINUE B12 SUPPLEMENT    CHECK LABS TODAY

## 2020-01-07 NOTE — LETTER
January 7, 2020      Cory Gardner MD  [address]  [address]  [facility] [address]           [facility] - Neurology  [address]  [place] [address]  Phone: [phone]  Fax: [phone]          Patient: Aroldo Johnston Jr.   MR Number: [id number]   YOB: 1940   Date of Visit: 1/7/2020       Dear Dr. Cory Gardner:    Thank you for referring Aroldo Johnston to me for evaluation. Attached you will find relevant portions of my assessment and plan of care.    If you have questions, please do not hesitate to call me. I look forward to following Aroldo Johnston along with you.    Sincerely,    Tino Cintron MD    Enclosure  CC:  No Recipients    If you would like to receive this communication electronically, please contact [email or web address] or [phone] to request more information on [facility] access.    For providers and/or their staff who would like to refer a patient to [facility], please contact us through our one-stop-shop provider referral line, [facility] [place], at [phone].    If you feel you have received this communication in error or would no longer like to receive these types of communications, please e-mail [email or web address]

## 2020-01-14 RX ORDER — ZINC GLUCONATE 50 MG
TABLET ORAL
Qty: 90 TABLET | Refills: 2 | Status: SHIPPED | OUTPATIENT
Start: 2020-01-14 | End: 2020-11-11 | Stop reason: SDUPTHER

## 2020-02-11 ENCOUNTER — DOCUMENTATION ONLY (OUTPATIENT)
Dept: NEUROLOGY | Facility: CLINIC | Age: 80
End: 2020-02-11

## 2020-02-11 NOTE — PROGRESS NOTES
Patient was scheduled and confirmed for EMG procedure but was a no-show.    Rafa Daniels MD  [facility]

## 2020-02-19 RX ORDER — MIRTAZAPINE 15 MG/1
TABLET, FILM COATED ORAL
Qty: 90 TABLET | Refills: 2 | Status: SHIPPED | OUTPATIENT
Start: 2020-02-19 | End: 2020-07-22

## 2020-02-19 NOTE — TELEPHONE ENCOUNTER
----- Message from Ml Sanchez sent at 2/19/2020  2:07 PM CST -----  Contact: SUSAN LIMON JR. [[id number]]  [facility] the clinic reply [facility]: [phone]    Please refill the medication(s) listed below. The patient can be reached at this phone number once it is called into the pharmacy.    Medication #1   mirtazapine (REMERON) 15 MG tablet        Preferred Pharmacy: [id number] [address]  [facility]

## 2020-02-21 ENCOUNTER — NURSE TRIAGE (OUTPATIENT)
Dept: ADMINISTRATIVE | Facility: CLINIC | Age: 80
End: 2020-02-21

## 2020-02-22 NOTE — TELEPHONE ENCOUNTER
"Spoke with pt:    States mirtazapine-- instructed pt was e scribed to [facility] at [address] Pt verbalizes understanding.       1842:Pt states med not at pharmacy.   1846: vm left on sister's phone.   1849:spoke with pharmacy: med is  ready -- 23.58$    1851: vm left that med is ready at [facility].           Reason for Disposition   Caller has medication question only, adult not sick, and triager answers question    Additional Information   Negative: [1] DOUBLE DOSE (an extra dose or lesser amount) of prescription drug AND [2] NO symptoms (Exception: a double dose of antibiotics)   Negative: Diabetes drug error or overdose (e.g., insulin or extra dose)   Negative: [1] Request for URGENT new prescription or refill of "essential" medication (i.e., likelihood of harm to patient if not taken) AND [2] triager unable to fill per unit policy   Negative: [1] Prescription not at pharmacy AND [2] was prescribed today by PCP   Negative: Pharmacy calling with prescription questions and triager unable to answer question   Negative: Caller has urgent medication question about med that PCP prescribed and triager unable to answer question   Negative: Caller has NON-URGENT medication question about med that PCP prescribed and triager unable to answer question   Negative: Caller requesting a NON-URGENT new prescription or refill and triager unable to refill per unit policy   Negative: Caller has medication question about med not prescribed by PCP and triager unable to answer question (e.g., compatibility with other med, storage)   Negative: [1] DOUBLE DOSE (an extra dose or lesser amount) of over-the-counter (OTC) drug AND [2] NO symptoms   Negative: [1] DOUBLE DOSE (an extra dose or lesser amount) of antibiotic drug AND [2] NO symptoms    Protocols used: MEDICATION QUESTION CALL-A-AH      "

## 2020-02-22 NOTE — TELEPHONE ENCOUNTER
Reason for Disposition   Message left on identified voice mail    Additional Information   Negative: Caller has already spoken with the PCP and has no further questions.   Negative: Caller has already spoken with another triager and has no further questions.   Negative: Caller has already spoken with another triager or PCP AND has further questions AND triager able to answer questions.   Negative: Busy signal.  First attempt to contact caller.  Follow-up call scheduled within 15 minutes.   Negative: No answer.  First attempt to contact caller.  Follow-up call scheduled within 15 minutes.    Protocols used: NO CONTACT OR DUPLICATE CONTACT CALL-A-

## 2020-02-28 ENCOUNTER — PATIENT OUTREACH (OUTPATIENT)
Dept: ADMINISTRATIVE | Facility: OTHER | Age: 80
End: 2020-02-28

## 2020-02-28 NOTE — PROGRESS NOTES
Chart reviewed.   Immunizations: Triggered Imm Registry     Orders placed: n/a  Upcoming appts to satisfy RAMA topics: n/a

## 2020-03-05 RX ORDER — FERROUS SULFATE 325(65) MG
TABLET ORAL
Qty: 180 TABLET | Refills: 2 | Status: SHIPPED | OUTPATIENT
Start: 2020-03-05 | End: 2021-03-30

## 2020-03-05 NOTE — TELEPHONE ENCOUNTER
----- Message from Fannie Burns sent at 3/5/2020 12:45 PM CST -----  Contact: Bhavani ([facility])  Name of Who is Calling: Bhavani ([facility])    What is the request in detail: Would like to inform provider that there is a major interaction between ferrous sulfate and JULUCA according to their system. Approval from provider is needed to dispense medications. Please contact to further discuss and advise      What Number to Call Back: [phone]

## 2020-04-04 ENCOUNTER — PATIENT OUTREACH (OUTPATIENT)
Dept: ADMINISTRATIVE | Facility: OTHER | Age: 80
End: 2020-04-04

## 2020-04-29 ENCOUNTER — TELEPHONE (OUTPATIENT)
Dept: UROLOGY | Facility: CLINIC | Age: 80
End: 2020-04-29

## 2020-04-29 ENCOUNTER — TELEPHONE (OUTPATIENT)
Dept: INTERNAL MEDICINE | Facility: CLINIC | Age: 80
End: 2020-04-29

## 2020-04-29 DIAGNOSIS — K51.919 ULCERATIVE COLITIS WITH COMPLICATION, UNSPECIFIED LOCATION: Primary | ICD-10-CM

## 2020-04-29 NOTE — TELEPHONE ENCOUNTER
----- Message from Fannie Burns sent at 4/29/2020  1:35 PM CDT -----  Contact: Gabby (sister)  Name of Who is Calling: Gabby (sister)    What is the request in detail: Would like to inform provider that the patient is having diarrhea. She would like a prescription to help manage it. Please contact to further discuss and advise      Can the clinic reply by [facility]: no    What [facility] to [facility] Back [facility]: [phone]

## 2020-04-29 NOTE — TELEPHONE ENCOUNTER
LVM for patient to schedule visit.    ----- Message from Blanche Barbour sent at 4/29/2020  1:59 PM CDT -----  Contact: SUSAN LIMON JR. [[id number]]  Name of Caller Elizabeth pt sister    Reason for Visit/Symptoms testosterone injection/ follow up    Best Contact Number or Confirm if Mychart Preferred [phone]    Preferred Date/Time of Appointment ASAP    Interested in Virtual Visit (yes/no) no      Additional Information

## 2020-04-29 NOTE — TELEPHONE ENCOUNTER
Spoke to the Pt's sister  States hx HIV and wears depends due to incontinence   1 week ago or more he has leakage   Has been keeping him hydration    Yesterday she states they cleaned it off the floor and off clothes  Has dementia can't cooperate    Asking for medication advise and worried if otc can interact with his HIV meds    msg routed to MD

## 2020-04-30 NOTE — TELEPHONE ENCOUNTER
Patient sister stated he doesn't currently have a GI doctor and yes he is still taking his mesalamine.

## 2020-04-30 NOTE — TELEPHONE ENCOUNTER
----- Message from Melonie Devi sent at 4/30/2020 11:34 AM CDT -----  Contact: SUSAN LIMON JR. [[id number]]  Name of Who is Calling : SUSAN LIMON JR. [[id number]]    Patient is requesting a call from staff in regards to having diarrhea and needing mediction  .....Please contact to further discuss and advise.    Can the clinic reply by [facility] :No     What Number to Call Back :  [phone]

## 2020-04-30 NOTE — TELEPHONE ENCOUNTER
Soonest appt is 06/23 sent a msg to [facility] gastro staff to see if there is a sooner available appt  Called sister and told her GI appt is far out and to call them, provided GI number [phone]

## 2020-05-01 ENCOUNTER — PATIENT OUTREACH (OUTPATIENT)
Dept: ADMINISTRATIVE | Facility: OTHER | Age: 80
End: 2020-05-01

## 2020-05-04 ENCOUNTER — OFFICE VISIT (OUTPATIENT)
Dept: UROLOGY | Facility: CLINIC | Age: 80
End: 2020-05-04
Attending: UROLOGY
Payer: COMMERCIAL

## 2020-05-04 VITALS
BODY MASS INDEX: 22.3 KG/M2 | DIASTOLIC BLOOD PRESSURE: 72 MMHG | SYSTOLIC BLOOD PRESSURE: 125 MMHG | HEIGHT: 63 IN | WEIGHT: 125.88 LBS | HEART RATE: 73 BPM

## 2020-05-04 DIAGNOSIS — C61 PROSTATE CANCER: Primary | ICD-10-CM

## 2020-05-04 PROCEDURE — 1159F PR MEDICATION LIST DOCUMENTED IN MEDICAL RECORD: ICD-10-PCS | Mod: S$GLB,,, | Performed by: UROLOGY

## 2020-05-04 PROCEDURE — 3078F DIAST BP <80 MM HG: CPT | Mod: CPTII,S$GLB,, | Performed by: UROLOGY

## 2020-05-04 PROCEDURE — 1126F PR PAIN SEVERITY QUANTIFIED, NO PAIN PRESENT: ICD-10-PCS | Mod: S$GLB,,, | Performed by: UROLOGY

## 2020-05-04 PROCEDURE — 1101F PR PT FALLS ASSESS DOC 0-1 FALLS W/OUT INJ PAST YR: ICD-10-PCS | Mod: CPTII,S$GLB,, | Performed by: UROLOGY

## 2020-05-04 PROCEDURE — 99214 OFFICE O/P EST MOD 30 MIN: CPT | Mod: S$GLB,,, | Performed by: UROLOGY

## 2020-05-04 PROCEDURE — 3074F PR MOST RECENT SYSTOLIC BLOOD PRESSURE < 130 MM HG: ICD-10-PCS | Mod: CPTII,S$GLB,, | Performed by: UROLOGY

## 2020-05-04 PROCEDURE — 1126F AMNT PAIN NOTED NONE PRSNT: CPT | Mod: S$GLB,,, | Performed by: UROLOGY

## 2020-05-04 PROCEDURE — 3074F SYST BP LT 130 MM HG: CPT | Mod: CPTII,S$GLB,, | Performed by: UROLOGY

## 2020-05-04 PROCEDURE — 3078F PR MOST RECENT DIASTOLIC BLOOD PRESSURE < 80 MM HG: ICD-10-PCS | Mod: CPTII,S$GLB,, | Performed by: UROLOGY

## 2020-05-04 PROCEDURE — 1159F MED LIST DOCD IN RCRD: CPT | Mod: S$GLB,,, | Performed by: UROLOGY

## 2020-05-04 PROCEDURE — 1101F PT FALLS ASSESS-DOCD LE1/YR: CPT | Mod: CPTII,S$GLB,, | Performed by: UROLOGY

## 2020-05-04 PROCEDURE — 99214 PR OFFICE/OUTPT VISIT, EST, LEVL IV, 30-39 MIN: ICD-10-PCS | Mod: S$GLB,,, | Performed by: UROLOGY

## 2020-05-04 NOTE — PROGRESS NOTES
"Subjective:      Aroldo Johnston Jr. is a 79 y.o. male who returns today regarding his prostate cancer, UPJ obstruction, balanitis, and stones.    Prior  history as per below.     No labs today.    Patient again reports urinary frequency, approximately every 30 minutes while awake. No nocturia, dysuria, hematuria, flank pain, intermittency, hesitancy, incomplete empyting, weak stream, or straining to void.     He does not pull his foreskin back to wash his penis.     Prostate Cancer  He states that he was diagnosed with prostate cancer in [place] at [facility] [place]; pT1c, [place] 3+3=6, PSA 16.5.  He states he was treated with radiation therapy with no known recurrence.  PSA sachin 0.97 in March 2009.  PSA slowly began to rise in 2011 and then more rapidly to 4.9 in Aug 2015, when he was referred to me.  Began ADT in 2016 and later opted for intermittent therapy. Last injection 3/3/16. PSA has been slowly increasing, now 8.2 from 6.1 in 01/2019.    UPJ Obstruction  He was diagnosed w/ left UPJ obstruction in 2014 after presenting with large renal stone.  He had robotic pyeloplasty in August 2014.  He did not FU for stent removal, which was noted on CT in September 2015.  Stent exchanged in OR on 9/24/15 and subsequently removed in clinic.  FU US demonstrated stable, likely chronic hydronephrosis. Cr is within normal limits.     The following portions of the patient's history were reviewed and updated as appropriate: allergies, current medications, past family history, past medical history, past social history, past surgical history and problem list.    Review of Systems  A comprehensive multipoint review of systems was negative except as otherwise stated in the HPI.     Objective:   Vitals:   /72 (BP Location: Right arm, Patient Position: Sitting, BP Method: Medium (Automatic))   Pulse 73   Ht 5' 3" (1.6 m)   Wt 57.1 kg (125 lb 14.1 oz)   BMI 22.30 kg/m²     Physical Exam   General: alert and oriented, no acute " distress  Respiratory: Symmetric expansion, non-labored breathing  Abdomen: soft, nontender, nondistended  : uncircumcised penis; mild balanitis, smegma present; testicular and scrotal exam normal; prostate with expected post-radiation changes  Neuro: no gross deficits; poor memory  Psych: normal judgment and insight, normal mood/affect and non-anxious    Lab Review   Component PSA DIAGNOSTIC   Latest Ref Rng & Units 0.00 - 4.00 ng/mL   8/12/2019 8.2 (H)   1/7/2019 6.1 (H)   6/25/2018 6.5 (H)   3/22/2018 6.4 (H)   9/18/2018 2.8   3/20/2017 0.86   9/16/2016 0.03   7/1/2016 0.05   3/29/2016 0.31   1/6/2016 8.0 (H)       Assessment and Plan:   Prostate cancer  Elevated PSA  -- PSA continues to rise  -- Intermittent ADT as indicated - plan for PSA > 10 --> will check today and plan for Eligard next week    Urinary Frequency  -- May improve w/ ADT  -- Avoid constipation    Hydronephrosis, left  -- Stable s/p left pyeloplasty

## 2020-05-11 ENCOUNTER — PATIENT OUTREACH (OUTPATIENT)
Dept: ADMINISTRATIVE | Facility: OTHER | Age: 80
End: 2020-05-11

## 2020-05-11 ENCOUNTER — TELEPHONE (OUTPATIENT)
Dept: UROLOGY | Facility: CLINIC | Age: 80
End: 2020-05-11

## 2020-05-11 NOTE — TELEPHONE ENCOUNTER
----- Message from Vivian Hernandez sent at 5/11/2020  3:19 PM CDT -----  Contact: Elizabeth/ / [phone]  Type: Patient Call Back    Who called:  Elizabeth    What is the request in detail:  Patients sister would like staff to give her a call regarding weather patient needs the shot.  Thank you    Would the patient rather a call back or a response via [facility]?   Call back    Best call back number:  [phone]

## 2020-05-12 ENCOUNTER — TELEPHONE (OUTPATIENT)
Dept: UROLOGY | Facility: CLINIC | Age: 80
End: 2020-05-12

## 2020-05-12 NOTE — TELEPHONE ENCOUNTER
Called left message for Elizabeth that Dr Rm does want Aroldo to have the injection.  If any questions she is to call us back.

## 2020-05-12 NOTE — TELEPHONE ENCOUNTER
----- Message from Virginia Bonilla NP sent at 5/12/2020 10:42 AM CDT -----  I spoke with Dr. Rm. He would like for the pt to receive the injection. Thanks!    ----- Message -----  From: Janessa HONEY Willson  Sent: 5/11/2020   3:41 PM CDT  To: Virginia Bonilla NP    Sister of patient called wanting to know if he still needs the injection Eligard.  He stated in his last note.-- Intermittent ADT as indicated - plan for PSA > 10 --> will check today and plan for Eligard next week      His lab came back at 9.      Will he need to get the injection on Wednesday?

## 2020-05-13 ENCOUNTER — OFFICE VISIT (OUTPATIENT)
Dept: UROLOGY | Facility: CLINIC | Age: 80
End: 2020-05-13
Payer: COMMERCIAL

## 2020-05-13 VITALS
BODY MASS INDEX: 22.15 KG/M2 | DIASTOLIC BLOOD PRESSURE: 70 MMHG | SYSTOLIC BLOOD PRESSURE: 159 MMHG | HEIGHT: 63 IN | WEIGHT: 125 LBS | HEART RATE: 64 BPM

## 2020-05-13 DIAGNOSIS — N13.5 UPJ (URETEROPELVIC JUNCTION) OBSTRUCTION: ICD-10-CM

## 2020-05-13 DIAGNOSIS — C61 PROSTATE CANCER: Primary | ICD-10-CM

## 2020-05-13 PROCEDURE — 99213 OFFICE O/P EST LOW 20 MIN: CPT | Mod: 25,S$GLB,, | Performed by: NURSE PRACTITIONER

## 2020-05-13 PROCEDURE — 96402 CHEMO HORMON ANTINEOPL SQ/IM: CPT | Mod: S$GLB,,, | Performed by: NURSE PRACTITIONER

## 2020-05-13 PROCEDURE — 1159F PR MEDICATION LIST DOCUMENTED IN MEDICAL RECORD: ICD-10-PCS | Mod: S$GLB,,, | Performed by: NURSE PRACTITIONER

## 2020-05-13 PROCEDURE — 3077F SYST BP >= 140 MM HG: CPT | Mod: CPTII,S$GLB,, | Performed by: NURSE PRACTITIONER

## 2020-05-13 PROCEDURE — 3077F PR MOST RECENT SYSTOLIC BLOOD PRESSURE >= 140 MM HG: ICD-10-PCS | Mod: CPTII,S$GLB,, | Performed by: NURSE PRACTITIONER

## 2020-05-13 PROCEDURE — 99213 PR OFFICE/OUTPT VISIT, EST, LEVL III, 20-29 MIN: ICD-10-PCS | Mod: 25,S$GLB,, | Performed by: NURSE PRACTITIONER

## 2020-05-13 PROCEDURE — 1126F PR PAIN SEVERITY QUANTIFIED, NO PAIN PRESENT: ICD-10-PCS | Mod: S$GLB,,, | Performed by: NURSE PRACTITIONER

## 2020-05-13 PROCEDURE — 1101F PT FALLS ASSESS-DOCD LE1/YR: CPT | Mod: CPTII,S$GLB,, | Performed by: NURSE PRACTITIONER

## 2020-05-13 PROCEDURE — 96402 PR CHEMOTHER HORMON ANTINEOPL SUB-Q/IM: ICD-10-PCS | Mod: S$GLB,,, | Performed by: NURSE PRACTITIONER

## 2020-05-13 PROCEDURE — 3078F DIAST BP <80 MM HG: CPT | Mod: CPTII,S$GLB,, | Performed by: NURSE PRACTITIONER

## 2020-05-13 PROCEDURE — 1126F AMNT PAIN NOTED NONE PRSNT: CPT | Mod: S$GLB,,, | Performed by: NURSE PRACTITIONER

## 2020-05-13 PROCEDURE — 1159F MED LIST DOCD IN RCRD: CPT | Mod: S$GLB,,, | Performed by: NURSE PRACTITIONER

## 2020-05-13 PROCEDURE — 1101F PR PT FALLS ASSESS DOC 0-1 FALLS W/OUT INJ PAST YR: ICD-10-PCS | Mod: CPTII,S$GLB,, | Performed by: NURSE PRACTITIONER

## 2020-05-13 PROCEDURE — 3078F PR MOST RECENT DIASTOLIC BLOOD PRESSURE < 80 MM HG: ICD-10-PCS | Mod: CPTII,S$GLB,, | Performed by: NURSE PRACTITIONER

## 2020-05-13 NOTE — PROGRESS NOTES
"Subjective:      Aroldo Johnston Jr. is a 79 y.o. male who returns today regarding his prostate cancer. He is an established patient of Dr. Acosta and is new to me today. He presents today with his sister who provides some of his medical history.     Prostate Cancer  "He states that he was diagnosed with prostate cancer in 2004 at [facility] [place]; pT1c, [place] 3+3=6, PSA 16.5.  He states he was treated with radiation therapy with no known recurrence.  PSA sachin 0.97 in March 2009.  PSA slowly began to rise in 2011 and then more rapidly to 4.9 in Aug 2015, when he was referred to me.  Began ADT in 2016 and later opted for intermittent therapy. Last injection 3/3/16. PSA has been slowly increasing, now 8.2 from 6.1 in 01/2019."    His PSA is now 9. He returns today for Eligard injection.     The following portions of the patient's history were reviewed and updated as appropriate: allergies, current medications, past family history, past medical history, past social history, past surgical history and problem list.    Review of Systems  Constitutional: no fever or chills  ENT: no nasal congestion or sore throat  Respiratory: no cough or shortness of breath  Cardiovascular: no chest pain or palpitations  Gastrointestinal: no nausea or vomiting, tolerating diet  Genitourinary: as per HPI  Hematologic/Lymphatic: no easy bruising or lymphadenopathy  Musculoskeletal: no arthralgias or myalgias  Neurological: no seizures or tremors  Behavioral/Psych: no auditory or visual hallucinations     Objective:   Vitals:   Vitals:    05/13/20 1142   BP: (!) 159/70   Pulse: 64       Physical Exam   General: alert and oriented, no acute distress  Head: normocephalic, atraumatic  Neck: supple, no lymphadenopathy, normal ROM, no masses  Respiratory: Symmetric expansion, non-labored breathing  Cardiovascular: regular rate and rhythm, nomal pulses, no peripheral edema  Abdomen: soft, non tender, non distended, no palpable masses, no hernias, no " hepatomegaly or splenomegaly  Genitourinary: deferred  Lymphatic: no inguinal nodes  Skin: normal coloration and turgor, no rashes, no suspicious skin lesions noted  Neuro: alert and oriented x3, no gross deficits  Psych: agitation    Lab Review   Urinalysis demonstrates : no specimen   Lab Results   Component Value Date    WBC 7.89 02/07/2019    HGB 12.6 (L) 02/07/2019    HCT 39.4 (L) 02/07/2019    HCT 22 (L) 06/15/2018     (H) 02/07/2019     02/07/2019     Lab Results   Component Value Date    CREATININE 1.1 06/12/2019    BUN 6 (L) 06/12/2019     Lab Results   Component Value Date    PSA 4.9 (H) 08/19/2015     Imaging   None    Assessment:   Prostate cancer  UPJ obstruction     Plan:   Aroldo was seen today for follow-up.    Diagnoses and all orders for this visit:    Prostate cancer on intermittent ADT  -     Testosterone; Future  -     Prostate Specific Antigen, Diagnostic; Future    Plan: Discussed PSA findings with Dr. Rm  --Will proceed with Eligard today   --Discussed potential SE of ADT  --Begin calcium + vitamin D and multivitamin daily while on ADT  --Follow up with Dr. Rm in 3 months with PSA and testosterone levels

## 2020-05-13 NOTE — LETTER
May 13, 2020      Jonatan Rm MD  [address]  [facility] [address]           [facility] [address]  [address] [address]  Phone: [phone]  Fax: [phone]          Patient: Aroldo Johnston Jr.   MR Number: [id number]   YOB: 1940   Date of Visit: 5/13/2020       Dear Dr. Jonatan Rm:    Thank you for referring Aroldo Jonhston to me for evaluation. Attached you will find relevant portions of my assessment and plan of care.    If you have questions, please do not hesitate to call me. I look forward to following Aroldo Johnston along with you.    Sincerely,    Virginia Bonilla, FERCHO    Enclosure  CC:  No Recipients    If you would like to receive this communication electronically, please contact [email or web address] or [phone] to request more information on [facility] access.    For providers and/or their staff who would like to refer a patient to [facility], please contact us through our one-stop-shop provider referral line, [facility] [place], at [phone].    If you feel you have received this communication in error or would no longer like to receive these types of communications, please e-mail [email or web address]

## 2020-05-21 ENCOUNTER — TELEPHONE (OUTPATIENT)
Dept: PRIMARY CARE CLINIC | Facility: CLINIC | Age: 80
End: 2020-05-21

## 2020-05-21 NOTE — TELEPHONE ENCOUNTER
Tried to speak with pt in regards to scheduling appt with GI. Pt did not want to talk and hung up the phone.

## 2020-05-21 NOTE — TELEPHONE ENCOUNTER
----- Message from Lisa Shoemaker RN sent at 5/21/2020  3:07 PM CDT -----  Good afternoon,    I was able to get in touch with the patient today after several attempts regarding his referral to IBD clinic for ulcerative colitis. He is deferring scheduling an appointment at this time due to him wanting to discuss this referral with Dr. Pena. Requested a call back in one week.    MARCELO Gonzalez  [facility]  [phone]

## 2020-07-01 ENCOUNTER — LAB VISIT (OUTPATIENT)
Dept: LAB | Facility: OTHER | Age: 80
End: 2020-07-01
Attending: INTERNAL MEDICINE
Payer: COMMERCIAL

## 2020-07-01 DIAGNOSIS — K52.9 INFLAMMATORY BOWEL DISEASE: Primary | ICD-10-CM

## 2020-07-01 PROCEDURE — 87329 GIARDIA AG IA: CPT

## 2020-07-01 PROCEDURE — 83993 ASSAY FOR CALPROTECTIN FECAL: CPT

## 2020-07-02 LAB
CRYPTOSP AG STL QL IA: NEGATIVE
G LAMBLIA AG STL QL IA: NEGATIVE

## 2020-07-07 LAB — CALPROTECTIN STL-MCNT: 73.8 MCG/G

## 2020-07-14 ENCOUNTER — PATIENT OUTREACH (OUTPATIENT)
Dept: ADMINISTRATIVE | Facility: OTHER | Age: 80
End: 2020-07-14

## 2020-07-31 ENCOUNTER — PATIENT OUTREACH (OUTPATIENT)
Dept: ADMINISTRATIVE | Facility: OTHER | Age: 80
End: 2020-07-31

## 2020-08-04 RX ORDER — SODIUM, POTASSIUM,MAG SULFATES 17.5-3.13G
SOLUTION, RECONSTITUTED, ORAL ORAL
COMMUNITY
Start: 2020-07-01

## 2020-08-13 DIAGNOSIS — C61 PROSTATE CANCER: Primary | ICD-10-CM

## 2020-08-14 ENCOUNTER — PATIENT OUTREACH (OUTPATIENT)
Dept: ADMINISTRATIVE | Facility: OTHER | Age: 80
End: 2020-08-14

## 2020-10-01 ENCOUNTER — TELEPHONE (OUTPATIENT)
Dept: INTERNAL MEDICINE | Facility: CLINIC | Age: 80
End: 2020-10-01

## 2020-10-27 ENCOUNTER — OFFICE VISIT (OUTPATIENT)
Dept: INTERNAL MEDICINE | Facility: CLINIC | Age: 80
End: 2020-10-27
Attending: INTERNAL MEDICINE
Payer: COMMERCIAL

## 2020-10-27 ENCOUNTER — LAB VISIT (OUTPATIENT)
Dept: LAB | Facility: OTHER | Age: 80
End: 2020-10-27
Attending: INTERNAL MEDICINE
Payer: COMMERCIAL

## 2020-10-27 VITALS
DIASTOLIC BLOOD PRESSURE: 78 MMHG | BODY MASS INDEX: 24.3 KG/M2 | WEIGHT: 137.13 LBS | SYSTOLIC BLOOD PRESSURE: 124 MMHG | HEIGHT: 63 IN | OXYGEN SATURATION: 94 % | HEART RATE: 73 BPM

## 2020-10-27 DIAGNOSIS — K51.919 ULCERATIVE COLITIS WITH COMPLICATION, UNSPECIFIED LOCATION: Primary | ICD-10-CM

## 2020-10-27 DIAGNOSIS — I10 ESSENTIAL HYPERTENSION: ICD-10-CM

## 2020-10-27 DIAGNOSIS — R27.0 ATAXIA: ICD-10-CM

## 2020-10-27 DIAGNOSIS — F01.50 VASCULAR DEMENTIA WITHOUT BEHAVIORAL DISTURBANCE: ICD-10-CM

## 2020-10-27 DIAGNOSIS — Z21 ASYMPTOMATIC HIV INFECTION: ICD-10-CM

## 2020-10-27 DIAGNOSIS — K51.919 ULCERATIVE COLITIS WITH COMPLICATION, UNSPECIFIED LOCATION: ICD-10-CM

## 2020-10-27 DIAGNOSIS — H61.23 CERUMINOSIS, BILATERAL: ICD-10-CM

## 2020-10-27 LAB
ALBUMIN SERPL BCP-MCNC: 4 G/DL (ref 3.5–5.2)
ALP SERPL-CCNC: 100 U/L (ref 55–135)
ALT SERPL W/O P-5'-P-CCNC: 125 U/L (ref 10–44)
ANION GAP SERPL CALC-SCNC: 9 MMOL/L (ref 8–16)
AST SERPL-CCNC: 69 U/L (ref 10–40)
BASOPHILS # BLD AUTO: 0.09 K/UL (ref 0–0.2)
BASOPHILS NFR BLD: 1 % (ref 0–1.9)
BILIRUB SERPL-MCNC: 0.5 MG/DL (ref 0.1–1)
BUN SERPL-MCNC: 15 MG/DL (ref 8–23)
CALCIUM SERPL-MCNC: 9.5 MG/DL (ref 8.7–10.5)
CHLORIDE SERPL-SCNC: 104 MMOL/L (ref 95–110)
CHOLEST SERPL-MCNC: 128 MG/DL (ref 120–199)
CHOLEST/HDLC SERPL: 4.9 {RATIO} (ref 2–5)
CO2 SERPL-SCNC: 25 MMOL/L (ref 23–29)
CREAT SERPL-MCNC: 1.3 MG/DL (ref 0.5–1.4)
CRP SERPL-MCNC: 7.1 MG/L (ref 0–8.2)
DIFFERENTIAL METHOD: ABNORMAL
EOSINOPHIL # BLD AUTO: 0.4 K/UL (ref 0–0.5)
EOSINOPHIL NFR BLD: 4.2 % (ref 0–8)
ERYTHROCYTE [DISTWIDTH] IN BLOOD BY AUTOMATED COUNT: 12.9 % (ref 11.5–14.5)
ERYTHROCYTE [SEDIMENTATION RATE] IN BLOOD: 25 MM/HR (ref 0–10)
EST. GFR  (AFRICAN AMERICAN): >60 ML/MIN/1.73 M^2
EST. GFR  (NON AFRICAN AMERICAN): 53 ML/MIN/1.73 M^2
ESTIMATED AVG GLUCOSE: 111 MG/DL (ref 68–131)
FOLATE SERPL-MCNC: 12.1 NG/ML (ref 4–24)
GLUCOSE SERPL-MCNC: 123 MG/DL (ref 70–110)
HBA1C MFR BLD HPLC: 5.5 % (ref 4–5.6)
HCT VFR BLD AUTO: 42.7 % (ref 40–54)
HDLC SERPL-MCNC: 26 MG/DL (ref 40–75)
HDLC SERPL: 20.3 % (ref 20–50)
HGB BLD-MCNC: 14.3 G/DL (ref 14–18)
IMM GRANULOCYTES # BLD AUTO: 0.06 K/UL (ref 0–0.04)
IMM GRANULOCYTES NFR BLD AUTO: 0.7 % (ref 0–0.5)
LDLC SERPL CALC-MCNC: 57.6 MG/DL (ref 63–159)
LYMPHOCYTES # BLD AUTO: 2.8 K/UL (ref 1–4.8)
LYMPHOCYTES NFR BLD: 32.9 % (ref 18–48)
MCH RBC QN AUTO: 32.3 PG (ref 27–31)
MCHC RBC AUTO-ENTMCNC: 33.5 G/DL (ref 32–36)
MCV RBC AUTO: 96 FL (ref 82–98)
MONOCYTES # BLD AUTO: 1 K/UL (ref 0.3–1)
MONOCYTES NFR BLD: 11.4 % (ref 4–15)
NEUTROPHILS # BLD AUTO: 4.3 K/UL (ref 1.8–7.7)
NEUTROPHILS NFR BLD: 49.8 % (ref 38–73)
NONHDLC SERPL-MCNC: 102 MG/DL
NRBC BLD-RTO: 0 /100 WBC
PLATELET # BLD AUTO: 282 K/UL (ref 150–350)
PMV BLD AUTO: 8.9 FL (ref 9.2–12.9)
POTASSIUM SERPL-SCNC: 4.3 MMOL/L (ref 3.5–5.1)
PROT SERPL-MCNC: 8 G/DL (ref 6–8.4)
RBC # BLD AUTO: 4.43 M/UL (ref 4.6–6.2)
SODIUM SERPL-SCNC: 138 MMOL/L (ref 136–145)
TRIGL SERPL-MCNC: 222 MG/DL (ref 30–150)
TSH SERPL DL<=0.005 MIU/L-ACNC: 3.21 UIU/ML (ref 0.4–4)
VIT B12 SERPL-MCNC: 1602 PG/ML (ref 210–950)
WBC # BLD AUTO: 8.62 K/UL (ref 3.9–12.7)

## 2020-10-27 PROCEDURE — 1126F AMNT PAIN NOTED NONE PRSNT: CPT | Mod: S$GLB,,, | Performed by: INTERNAL MEDICINE

## 2020-10-27 PROCEDURE — 99214 PR OFFICE/OUTPT VISIT, EST, LEVL IV, 30-39 MIN: ICD-10-PCS | Mod: S$GLB,,, | Performed by: INTERNAL MEDICINE

## 2020-10-27 PROCEDURE — 1126F PR PAIN SEVERITY QUANTIFIED, NO PAIN PRESENT: ICD-10-PCS | Mod: S$GLB,,, | Performed by: INTERNAL MEDICINE

## 2020-10-27 PROCEDURE — 82607 VITAMIN B-12: CPT

## 2020-10-27 PROCEDURE — 83036 HEMOGLOBIN GLYCOSYLATED A1C: CPT

## 2020-10-27 PROCEDURE — 3074F PR MOST RECENT SYSTOLIC BLOOD PRESSURE < 130 MM HG: ICD-10-PCS | Mod: CPTII,S$GLB,, | Performed by: INTERNAL MEDICINE

## 2020-10-27 PROCEDURE — 1159F MED LIST DOCD IN RCRD: CPT | Mod: S$GLB,,, | Performed by: INTERNAL MEDICINE

## 2020-10-27 PROCEDURE — 1101F PR PT FALLS ASSESS DOC 0-1 FALLS W/OUT INJ PAST YR: ICD-10-PCS | Mod: CPTII,S$GLB,, | Performed by: INTERNAL MEDICINE

## 2020-10-27 PROCEDURE — 99999 PR PBB SHADOW E&M-EST. PATIENT-LVL III: CPT | Mod: PBBFAC,,, | Performed by: INTERNAL MEDICINE

## 2020-10-27 PROCEDURE — 85025 COMPLETE CBC W/AUTO DIFF WBC: CPT

## 2020-10-27 PROCEDURE — 86361 T CELL ABSOLUTE COUNT: CPT

## 2020-10-27 PROCEDURE — 3078F PR MOST RECENT DIASTOLIC BLOOD PRESSURE < 80 MM HG: ICD-10-PCS | Mod: CPTII,S$GLB,, | Performed by: INTERNAL MEDICINE

## 2020-10-27 PROCEDURE — 1159F PR MEDICATION LIST DOCUMENTED IN MEDICAL RECORD: ICD-10-PCS | Mod: S$GLB,,, | Performed by: INTERNAL MEDICINE

## 2020-10-27 PROCEDURE — 80061 LIPID PANEL: CPT

## 2020-10-27 PROCEDURE — 86140 C-REACTIVE PROTEIN: CPT

## 2020-10-27 PROCEDURE — 84443 ASSAY THYROID STIM HORMONE: CPT

## 2020-10-27 PROCEDURE — 1101F PT FALLS ASSESS-DOCD LE1/YR: CPT | Mod: CPTII,S$GLB,, | Performed by: INTERNAL MEDICINE

## 2020-10-27 PROCEDURE — 82746 ASSAY OF FOLIC ACID SERUM: CPT

## 2020-10-27 PROCEDURE — 80053 COMPREHEN METABOLIC PANEL: CPT

## 2020-10-27 PROCEDURE — 99999 PR PBB SHADOW E&M-EST. PATIENT-LVL III: ICD-10-PCS | Mod: PBBFAC,,, | Performed by: INTERNAL MEDICINE

## 2020-10-27 PROCEDURE — 87536 HIV-1 QUANT&REVRSE TRNSCRPJ: CPT

## 2020-10-27 PROCEDURE — 3074F SYST BP LT 130 MM HG: CPT | Mod: CPTII,S$GLB,, | Performed by: INTERNAL MEDICINE

## 2020-10-27 PROCEDURE — 3078F DIAST BP <80 MM HG: CPT | Mod: CPTII,S$GLB,, | Performed by: INTERNAL MEDICINE

## 2020-10-27 PROCEDURE — 99214 OFFICE O/P EST MOD 30 MIN: CPT | Mod: S$GLB,,, | Performed by: INTERNAL MEDICINE

## 2020-10-27 PROCEDURE — 85651 RBC SED RATE NONAUTOMATED: CPT

## 2020-10-27 NOTE — PROGRESS NOTES
"Subjective:       Patient ID: Aroldo Johnston Jr. is a 80 y.o. male.    Chief Complaint: No chief complaint on file.    Here for annual exam    78 yo M c/ PMHx of ulcerative colitis, HIV, recurrent prostate CA s/p prostatectomy, worsening dementia, b12 deficiency presents for routine exam    His sister Gabby is present who provides reliable Hx.    Most notable change is continued reports of weakness and some signs of slpwing overall. Gait unsteady. Has fell. NO associated LOC or subsequent pains from falls. Baseline dementia. Continued diarrhea throughout the day. Sister and [facility] able to gfet pt to schedule colonoscopy but pt refused to go through with it. No new pains, weight loss, constipation, dizziness, SOB, DALLAS, PND, CP.       Review of Systems   Unable to perform ROS: Dementia       Objective:      Vitals:    10/27/20 1305   BP: 124/78   BP Location: Right arm   Patient Position: Sitting   BP Method: Large (Manual)   Pulse: 73   SpO2: (!) 94%   Weight: 62.2 kg (137 lb 2 oz)   Height: 5' 3" (1.6 m)      Physical Exam  Constitutional:       General: He is not in acute distress.     Appearance: He is well-developed.   HENT:      Head: Normocephalic and atraumatic.      Mouth/Throat:      Pharynx: No oropharyngeal exudate.   Eyes:      General: No scleral icterus.     Conjunctiva/sclera: Conjunctivae normal.      Pupils: Pupils are equal, round, and reactive to light.   Neck:      Thyroid: No thyromegaly.   Cardiovascular:      Rate and Rhythm: Normal rate and regular rhythm.      Heart sounds: Normal heart sounds. No murmur.   Pulmonary:      Effort: Pulmonary effort is normal.      Breath sounds: Normal breath sounds. No wheezing or rales.   Abdominal:      General: There is no distension.      Palpations: Abdomen is soft.      Tenderness: There is no abdominal tenderness.   Musculoskeletal:         General: No tenderness.   Lymphadenopathy:      Cervical: No cervical adenopathy.   Skin:     General: Skin " is warm and dry.   Neurological:      Mental Status: He is alert and oriented to person, place, and time.   Psychiatric:         Behavior: Behavior normal.         Assessment:       1. Ulcerative colitis with complication, unspecified location    2. Vascular dementia without behavioral disturbance    3. Essential hypertension    4. Asymptomatic HIV infection    5. Ceruminosis, bilateral    6. Ataxia        Plan:       Diagnoses and all orders for this visit:    Ulcerative colitis with complication, unspecified location  -     Vitamin B12; Future  -     Folate; Future  -     Sedimentation rate; Future  -     C-reactive protein; Future    Vascular dementia without behavioral disturbance  -     Lipid Panel; Future  -     CBC auto differential; Future  -     Hemoglobin A1C; Future    Essential hypertension    Asymptomatic HIV infection  -     HIV RNA, QUANTITATIVE, PCR; Future  -     T-HELPER CELLS (CD4) COUNT; Future    Ceruminosis, bilateral  -     Ambulatory referral/consult to ENT; Future    Ataxia  -     Comprehensive Metabolic Panel; Future  -     TSH; Future  -     Vitamin B12; Future  -     Folate; Future           Cory Pena MD  Internal Medicine-[facility]        Side effects of medication(s) were discussed in detail and patient voiced understanding.  Patient will call back for any issues or complications.

## 2020-10-28 ENCOUNTER — TELEPHONE (OUTPATIENT)
Dept: INTERNAL MEDICINE | Facility: CLINIC | Age: 80
End: 2020-10-28

## 2020-10-28 ENCOUNTER — IMMUNIZATION (OUTPATIENT)
Dept: PHARMACY | Facility: CLINIC | Age: 80
End: 2020-10-28
Payer: COMMERCIAL

## 2020-10-29 LAB
CD3+CD4+ CELLS # BLD: 699 CELLS/UL (ref 300–1400)
CD3+CD4+ CELLS NFR BLD: 25.1 % (ref 28–57)

## 2020-11-03 LAB
HIV UQ DATE RECEIVED: NORMAL
HIV UQ DATE REPORTED: NORMAL
HIV1 RNA # SERPL NAA+PROBE: <40 COPIES/ML
HIV1 RNA SERPL NAA+PROBE-LOG#: <1.6 LOG (10) COPIES/ML
HIV1 RNA SERPL QL NAA+PROBE: NOT DETECTED

## 2020-11-11 ENCOUNTER — PATIENT OUTREACH (OUTPATIENT)
Dept: ADMINISTRATIVE | Facility: HOSPITAL | Age: 80
End: 2020-11-11

## 2020-11-11 DIAGNOSIS — I10 ESSENTIAL HYPERTENSION: ICD-10-CM

## 2020-11-11 DIAGNOSIS — E53.8 B12 DEFICIENCY: Primary | ICD-10-CM

## 2020-11-11 RX ORDER — ATORVASTATIN CALCIUM 40 MG/1
40 TABLET, FILM COATED ORAL DAILY
Qty: 90 TABLET | Refills: 3 | Status: SHIPPED | OUTPATIENT
Start: 2020-11-11 | End: 2021-08-09

## 2020-11-11 RX ORDER — LANOLIN ALCOHOL/MO/W.PET/CERES
1000 CREAM (GRAM) TOPICAL DAILY
Qty: 90 TABLET | Refills: 3 | Status: SHIPPED | OUTPATIENT
Start: 2020-11-11 | End: 2021-11-22

## 2020-11-19 ENCOUNTER — TELEPHONE (OUTPATIENT)
Dept: INTERNAL MEDICINE | Facility: CLINIC | Age: 80
End: 2020-11-19

## 2021-01-14 ENCOUNTER — TELEPHONE (OUTPATIENT)
Dept: INTERNAL MEDICINE | Facility: CLINIC | Age: 81
End: 2021-01-14

## 2021-04-27 DIAGNOSIS — K51.919 ULCERATIVE COLITIS WITH COMPLICATION, UNSPECIFIED LOCATION: Primary | ICD-10-CM

## 2021-04-27 RX ORDER — MIRTAZAPINE 15 MG/1
15 TABLET, FILM COATED ORAL NIGHTLY
Qty: 60 TABLET | Refills: 3 | Status: SHIPPED | OUTPATIENT
Start: 2021-04-27 | End: 2021-04-28 | Stop reason: SDUPTHER

## 2021-04-30 ENCOUNTER — TELEPHONE (OUTPATIENT)
Dept: INTERNAL MEDICINE | Facility: CLINIC | Age: 81
End: 2021-04-30

## 2021-06-04 ENCOUNTER — TELEPHONE (OUTPATIENT)
Dept: UROLOGY | Facility: CLINIC | Age: 81
End: 2021-06-04

## 2021-06-04 ENCOUNTER — TELEPHONE (OUTPATIENT)
Dept: INTERNAL MEDICINE | Facility: CLINIC | Age: 81
End: 2021-06-04

## 2021-06-04 DIAGNOSIS — R97.20 ELEVATED PSA: Primary | ICD-10-CM

## 2021-06-04 DIAGNOSIS — C61 PROSTATE CANCER: ICD-10-CM

## 2021-06-04 DIAGNOSIS — K51.919 ULCERATIVE COLITIS WITH COMPLICATION, UNSPECIFIED LOCATION: ICD-10-CM

## 2021-06-04 DIAGNOSIS — E53.8 B12 DEFICIENCY: Primary | ICD-10-CM

## 2021-06-04 DIAGNOSIS — F01.50 VASCULAR DEMENTIA WITHOUT BEHAVIORAL DISTURBANCE: ICD-10-CM

## 2021-06-04 DIAGNOSIS — R97.20 ELEVATED PSA: ICD-10-CM

## 2021-06-04 DIAGNOSIS — R27.0 ATAXIA: ICD-10-CM

## 2021-06-04 DIAGNOSIS — B20 HIV INFECTION, UNSPECIFIED SYMPTOM STATUS: ICD-10-CM

## 2021-06-17 ENCOUNTER — LAB VISIT (OUTPATIENT)
Dept: LAB | Facility: OTHER | Age: 81
End: 2021-06-17
Attending: INTERNAL MEDICINE
Payer: COMMERCIAL

## 2021-06-17 DIAGNOSIS — B20 HIV INFECTION, UNSPECIFIED SYMPTOM STATUS: ICD-10-CM

## 2021-06-17 DIAGNOSIS — F01.50 VASCULAR DEMENTIA WITHOUT BEHAVIORAL DISTURBANCE: ICD-10-CM

## 2021-06-17 DIAGNOSIS — R27.0 ATAXIA: ICD-10-CM

## 2021-06-17 DIAGNOSIS — C61 PROSTATE CANCER: ICD-10-CM

## 2021-06-17 DIAGNOSIS — R97.20 ELEVATED PSA: ICD-10-CM

## 2021-06-17 DIAGNOSIS — E53.8 B12 DEFICIENCY: ICD-10-CM

## 2021-06-17 DIAGNOSIS — K51.919 ULCERATIVE COLITIS WITH COMPLICATION, UNSPECIFIED LOCATION: ICD-10-CM

## 2021-06-17 LAB
ALBUMIN SERPL BCP-MCNC: 3.8 G/DL (ref 3.5–5.2)
ALP SERPL-CCNC: 86 U/L (ref 55–135)
ALT SERPL W/O P-5'-P-CCNC: 36 U/L (ref 10–44)
ANION GAP SERPL CALC-SCNC: 8 MMOL/L (ref 8–16)
AST SERPL-CCNC: 27 U/L (ref 10–40)
BASOPHILS # BLD AUTO: 0.09 K/UL (ref 0–0.2)
BASOPHILS NFR BLD: 1.1 % (ref 0–1.9)
BILIRUB SERPL-MCNC: 0.7 MG/DL (ref 0.1–1)
BUN SERPL-MCNC: 7 MG/DL (ref 8–23)
CALCIUM SERPL-MCNC: 9.9 MG/DL (ref 8.7–10.5)
CHLORIDE SERPL-SCNC: 104 MMOL/L (ref 95–110)
CHOLEST SERPL-MCNC: 125 MG/DL (ref 120–199)
CHOLEST/HDLC SERPL: 4.8 {RATIO} (ref 2–5)
CO2 SERPL-SCNC: 23 MMOL/L (ref 23–29)
COMPLEXED PSA SERPL-MCNC: 9.7 NG/ML (ref 0–4)
CREAT SERPL-MCNC: 1.3 MG/DL (ref 0.5–1.4)
DIFFERENTIAL METHOD: ABNORMAL
EOSINOPHIL # BLD AUTO: 0.3 K/UL (ref 0–0.5)
EOSINOPHIL NFR BLD: 3.5 % (ref 0–8)
ERYTHROCYTE [DISTWIDTH] IN BLOOD BY AUTOMATED COUNT: 12.9 % (ref 11.5–14.5)
ERYTHROCYTE [SEDIMENTATION RATE] IN BLOOD: 18 MM/HR (ref 0–10)
EST. GFR  (AFRICAN AMERICAN): >60 ML/MIN/1.73 M^2
EST. GFR  (NON AFRICAN AMERICAN): 52 ML/MIN/1.73 M^2
ESTIMATED AVG GLUCOSE: 108 MG/DL (ref 68–131)
FOLATE SERPL-MCNC: 3.5 NG/ML (ref 4–24)
GLUCOSE SERPL-MCNC: 98 MG/DL (ref 70–110)
HBA1C MFR BLD: 5.4 % (ref 4–5.6)
HCT VFR BLD AUTO: 47.4 % (ref 40–54)
HDLC SERPL-MCNC: 26 MG/DL (ref 40–75)
HDLC SERPL: 20.8 % (ref 20–50)
HGB BLD-MCNC: 16.2 G/DL (ref 14–18)
IMM GRANULOCYTES # BLD AUTO: 0.04 K/UL (ref 0–0.04)
IMM GRANULOCYTES NFR BLD AUTO: 0.5 % (ref 0–0.5)
LDLC SERPL CALC-MCNC: 59.8 MG/DL (ref 63–159)
LYMPHOCYTES # BLD AUTO: 2.8 K/UL (ref 1–4.8)
LYMPHOCYTES NFR BLD: 32.9 % (ref 18–48)
MCH RBC QN AUTO: 32.1 PG (ref 27–31)
MCHC RBC AUTO-ENTMCNC: 34.2 G/DL (ref 32–36)
MCV RBC AUTO: 94 FL (ref 82–98)
MONOCYTES # BLD AUTO: 0.9 K/UL (ref 0.3–1)
MONOCYTES NFR BLD: 10.6 % (ref 4–15)
NEUTROPHILS # BLD AUTO: 4.3 K/UL (ref 1.8–7.7)
NEUTROPHILS NFR BLD: 51.4 % (ref 38–73)
NONHDLC SERPL-MCNC: 99 MG/DL
NRBC BLD-RTO: 0 /100 WBC
PLATELET # BLD AUTO: 296 K/UL (ref 150–450)
PMV BLD AUTO: 8.6 FL (ref 9.2–12.9)
POTASSIUM SERPL-SCNC: 4 MMOL/L (ref 3.5–5.1)
PROSTATE SPECIFIC ANTIGEN, TOTAL: 7.6 NG/ML (ref 0–4)
PROT SERPL-MCNC: 7.8 G/DL (ref 6–8.4)
PSA FREE MFR SERPL: 6.84 %
PSA FREE SERPL-MCNC: 0.52 NG/ML (ref 0.01–1.5)
RBC # BLD AUTO: 5.05 M/UL (ref 4.6–6.2)
SODIUM SERPL-SCNC: 135 MMOL/L (ref 136–145)
TESTOST SERPL-MCNC: 581 NG/DL (ref 304–1227)
TRIGL SERPL-MCNC: 196 MG/DL (ref 30–150)
TSH SERPL DL<=0.005 MIU/L-ACNC: 3.45 UIU/ML (ref 0.4–4)
VIT B12 SERPL-MCNC: 899 PG/ML (ref 210–950)
WBC # BLD AUTO: 8.38 K/UL (ref 3.9–12.7)

## 2021-06-17 PROCEDURE — 84403 ASSAY OF TOTAL TESTOSTERONE: CPT | Performed by: INTERNAL MEDICINE

## 2021-06-17 PROCEDURE — 80061 LIPID PANEL: CPT | Performed by: INTERNAL MEDICINE

## 2021-06-17 PROCEDURE — 84154 ASSAY OF PSA FREE: CPT | Performed by: INTERNAL MEDICINE

## 2021-06-17 PROCEDURE — 85025 COMPLETE CBC W/AUTO DIFF WBC: CPT | Performed by: INTERNAL MEDICINE

## 2021-06-17 PROCEDURE — 84153 ASSAY OF PSA TOTAL: CPT | Performed by: UROLOGY

## 2021-06-17 PROCEDURE — 85651 RBC SED RATE NONAUTOMATED: CPT | Performed by: INTERNAL MEDICINE

## 2021-06-17 PROCEDURE — 83036 HEMOGLOBIN GLYCOSYLATED A1C: CPT | Performed by: INTERNAL MEDICINE

## 2021-06-17 PROCEDURE — 82607 VITAMIN B-12: CPT | Performed by: INTERNAL MEDICINE

## 2021-06-17 PROCEDURE — 86361 T CELL ABSOLUTE COUNT: CPT | Performed by: INTERNAL MEDICINE

## 2021-06-17 PROCEDURE — 80053 COMPREHEN METABOLIC PANEL: CPT | Performed by: INTERNAL MEDICINE

## 2021-06-17 PROCEDURE — 82746 ASSAY OF FOLIC ACID SERUM: CPT | Performed by: INTERNAL MEDICINE

## 2021-06-17 PROCEDURE — 84153 ASSAY OF PSA TOTAL: CPT | Mod: 91 | Performed by: INTERNAL MEDICINE

## 2021-06-17 PROCEDURE — 36415 COLL VENOUS BLD VENIPUNCTURE: CPT | Performed by: INTERNAL MEDICINE

## 2021-06-17 PROCEDURE — 84443 ASSAY THYROID STIM HORMONE: CPT | Performed by: INTERNAL MEDICINE

## 2021-06-18 ENCOUNTER — PATIENT MESSAGE (OUTPATIENT)
Dept: INTERNAL MEDICINE | Facility: CLINIC | Age: 81
End: 2021-06-18

## 2021-06-18 DIAGNOSIS — C61 PROSTATE CANCER: Primary | ICD-10-CM

## 2021-06-18 LAB
CD3+CD4+ CELLS # BLD: 571 CELLS/UL (ref 300–1400)
CD3+CD4+ CELLS NFR BLD: 22.2 % (ref 28–57)

## 2021-06-21 ENCOUNTER — OFFICE VISIT (OUTPATIENT)
Dept: INTERNAL MEDICINE | Facility: CLINIC | Age: 81
End: 2021-06-21
Attending: INTERNAL MEDICINE
Payer: COMMERCIAL

## 2021-06-21 VITALS
OXYGEN SATURATION: 95 % | WEIGHT: 136.88 LBS | HEART RATE: 61 BPM | SYSTOLIC BLOOD PRESSURE: 138 MMHG | DIASTOLIC BLOOD PRESSURE: 72 MMHG | BODY MASS INDEX: 24.25 KG/M2

## 2021-06-21 DIAGNOSIS — N39.41 URGE INCONTINENCE OF URINE: ICD-10-CM

## 2021-06-21 DIAGNOSIS — F03.91 DEMENTIA WITH BEHAVIORAL DISTURBANCE, UNSPECIFIED DEMENTIA TYPE: ICD-10-CM

## 2021-06-21 DIAGNOSIS — I25.10 CAD IN NATIVE ARTERY: ICD-10-CM

## 2021-06-21 DIAGNOSIS — I10 ESSENTIAL HYPERTENSION: Primary | ICD-10-CM

## 2021-06-21 DIAGNOSIS — E53.8 B12 DEFICIENCY: ICD-10-CM

## 2021-06-21 PROCEDURE — 99999 PR PBB SHADOW E&M-EST. PATIENT-LVL II: CPT | Mod: PBBFAC,,, | Performed by: INTERNAL MEDICINE

## 2021-06-21 PROCEDURE — 1159F MED LIST DOCD IN RCRD: CPT | Mod: S$GLB,,, | Performed by: INTERNAL MEDICINE

## 2021-06-21 PROCEDURE — 3075F SYST BP GE 130 - 139MM HG: CPT | Mod: CPTII,S$GLB,, | Performed by: INTERNAL MEDICINE

## 2021-06-21 PROCEDURE — 1126F AMNT PAIN NOTED NONE PRSNT: CPT | Mod: S$GLB,,, | Performed by: INTERNAL MEDICINE

## 2021-06-21 PROCEDURE — 1101F PR PT FALLS ASSESS DOC 0-1 FALLS W/OUT INJ PAST YR: ICD-10-PCS | Mod: CPTII,S$GLB,, | Performed by: INTERNAL MEDICINE

## 2021-06-21 PROCEDURE — 3288F FALL RISK ASSESSMENT DOCD: CPT | Mod: CPTII,S$GLB,, | Performed by: INTERNAL MEDICINE

## 2021-06-21 PROCEDURE — 99999 PR PBB SHADOW E&M-EST. PATIENT-LVL II: ICD-10-PCS | Mod: PBBFAC,,, | Performed by: INTERNAL MEDICINE

## 2021-06-21 PROCEDURE — 99214 OFFICE O/P EST MOD 30 MIN: CPT | Mod: S$GLB,,, | Performed by: INTERNAL MEDICINE

## 2021-06-21 PROCEDURE — 3288F PR FALLS RISK ASSESSMENT DOCUMENTED: ICD-10-PCS | Mod: CPTII,S$GLB,, | Performed by: INTERNAL MEDICINE

## 2021-06-21 PROCEDURE — 1126F PR PAIN SEVERITY QUANTIFIED, NO PAIN PRESENT: ICD-10-PCS | Mod: S$GLB,,, | Performed by: INTERNAL MEDICINE

## 2021-06-21 PROCEDURE — 3075F PR MOST RECENT SYSTOLIC BLOOD PRESS GE 130-139MM HG: ICD-10-PCS | Mod: CPTII,S$GLB,, | Performed by: INTERNAL MEDICINE

## 2021-06-21 PROCEDURE — 3078F DIAST BP <80 MM HG: CPT | Mod: CPTII,S$GLB,, | Performed by: INTERNAL MEDICINE

## 2021-06-21 PROCEDURE — 3078F PR MOST RECENT DIASTOLIC BLOOD PRESSURE < 80 MM HG: ICD-10-PCS | Mod: CPTII,S$GLB,, | Performed by: INTERNAL MEDICINE

## 2021-06-21 PROCEDURE — 1159F PR MEDICATION LIST DOCUMENTED IN MEDICAL RECORD: ICD-10-PCS | Mod: S$GLB,,, | Performed by: INTERNAL MEDICINE

## 2021-06-21 PROCEDURE — 1101F PT FALLS ASSESS-DOCD LE1/YR: CPT | Mod: CPTII,S$GLB,, | Performed by: INTERNAL MEDICINE

## 2021-06-21 PROCEDURE — 99214 PR OFFICE/OUTPT VISIT, EST, LEVL IV, 30-39 MIN: ICD-10-PCS | Mod: S$GLB,,, | Performed by: INTERNAL MEDICINE

## 2021-07-17 ENCOUNTER — PATIENT OUTREACH (OUTPATIENT)
Dept: ADMINISTRATIVE | Facility: OTHER | Age: 81
End: 2021-07-17

## 2021-07-19 ENCOUNTER — OFFICE VISIT (OUTPATIENT)
Dept: UROLOGY | Facility: CLINIC | Age: 81
End: 2021-07-19
Attending: UROLOGY
Payer: COMMERCIAL

## 2021-07-19 VITALS
DIASTOLIC BLOOD PRESSURE: 71 MMHG | BODY MASS INDEX: 24.25 KG/M2 | HEART RATE: 69 BPM | SYSTOLIC BLOOD PRESSURE: 146 MMHG | HEIGHT: 63 IN | WEIGHT: 136.88 LBS

## 2021-07-19 DIAGNOSIS — C61 PROSTATE CANCER: Primary | ICD-10-CM

## 2021-07-19 PROCEDURE — 1101F PR PT FALLS ASSESS DOC 0-1 FALLS W/OUT INJ PAST YR: ICD-10-PCS | Mod: CPTII,S$GLB,, | Performed by: UROLOGY

## 2021-07-19 PROCEDURE — 3288F PR FALLS RISK ASSESSMENT DOCUMENTED: ICD-10-PCS | Mod: CPTII,S$GLB,, | Performed by: UROLOGY

## 2021-07-19 PROCEDURE — 1101F PT FALLS ASSESS-DOCD LE1/YR: CPT | Mod: CPTII,S$GLB,, | Performed by: UROLOGY

## 2021-07-19 PROCEDURE — 1126F AMNT PAIN NOTED NONE PRSNT: CPT | Mod: CPTII,S$GLB,, | Performed by: UROLOGY

## 2021-07-19 PROCEDURE — 99214 OFFICE O/P EST MOD 30 MIN: CPT | Mod: S$GLB,,, | Performed by: UROLOGY

## 2021-07-19 PROCEDURE — 1159F MED LIST DOCD IN RCRD: CPT | Mod: CPTII,S$GLB,, | Performed by: UROLOGY

## 2021-07-19 PROCEDURE — 3288F FALL RISK ASSESSMENT DOCD: CPT | Mod: CPTII,S$GLB,, | Performed by: UROLOGY

## 2021-07-19 PROCEDURE — 1126F PR PAIN SEVERITY QUANTIFIED, NO PAIN PRESENT: ICD-10-PCS | Mod: CPTII,S$GLB,, | Performed by: UROLOGY

## 2021-07-19 PROCEDURE — 99214 PR OFFICE/OUTPT VISIT, EST, LEVL IV, 30-39 MIN: ICD-10-PCS | Mod: S$GLB,,, | Performed by: UROLOGY

## 2021-07-19 PROCEDURE — 1159F PR MEDICATION LIST DOCUMENTED IN MEDICAL RECORD: ICD-10-PCS | Mod: CPTII,S$GLB,, | Performed by: UROLOGY

## 2021-08-06 ENCOUNTER — NURSE TRIAGE (OUTPATIENT)
Dept: ADMINISTRATIVE | Facility: CLINIC | Age: 81
End: 2021-08-06

## 2021-08-06 DIAGNOSIS — R05.9 COUGH: ICD-10-CM

## 2021-08-06 DIAGNOSIS — R53.83 FATIGUE, UNSPECIFIED TYPE: Primary | ICD-10-CM

## 2021-12-10 ENCOUNTER — OFFICE VISIT (OUTPATIENT)
Dept: URGENT CARE | Facility: CLINIC | Age: 81
End: 2021-12-10
Payer: COMMERCIAL

## 2021-12-10 VITALS
TEMPERATURE: 99 F | BODY MASS INDEX: 23.05 KG/M2 | WEIGHT: 135 LBS | OXYGEN SATURATION: 95 % | HEIGHT: 64 IN | DIASTOLIC BLOOD PRESSURE: 80 MMHG | HEART RATE: 80 BPM | SYSTOLIC BLOOD PRESSURE: 131 MMHG

## 2021-12-10 DIAGNOSIS — J06.9 VIRAL URI: Primary | ICD-10-CM

## 2021-12-10 DIAGNOSIS — R05.9 COUGH: ICD-10-CM

## 2021-12-10 DIAGNOSIS — R09.89 CHEST CONGESTION: ICD-10-CM

## 2021-12-10 LAB
CTP QC/QA: YES
SARS-COV-2 RDRP RESP QL NAA+PROBE: NEGATIVE

## 2021-12-10 PROCEDURE — 71046 X-RAY EXAM CHEST 2 VIEWS: CPT | Mod: FY,S$GLB,, | Performed by: RADIOLOGY

## 2021-12-10 PROCEDURE — 99214 PR OFFICE/OUTPT VISIT, EST, LEVL IV, 30-39 MIN: ICD-10-PCS | Mod: S$GLB,CS,, | Performed by: FAMILY MEDICINE

## 2021-12-10 PROCEDURE — U0002 COVID-19 LAB TEST NON-CDC: HCPCS | Mod: QW,S$GLB,, | Performed by: FAMILY MEDICINE

## 2021-12-10 PROCEDURE — 71046 XR CHEST PA AND LATERAL: ICD-10-PCS | Mod: FY,S$GLB,, | Performed by: RADIOLOGY

## 2021-12-10 PROCEDURE — 99214 OFFICE O/P EST MOD 30 MIN: CPT | Mod: S$GLB,CS,, | Performed by: FAMILY MEDICINE

## 2021-12-10 PROCEDURE — U0002: ICD-10-PCS | Mod: QW,S$GLB,, | Performed by: FAMILY MEDICINE

## 2021-12-10 RX ORDER — BENZONATATE 200 MG/1
200 CAPSULE ORAL 3 TIMES DAILY PRN
Qty: 30 CAPSULE | Refills: 0 | Status: SHIPPED | OUTPATIENT
Start: 2021-12-10

## 2021-12-10 RX ORDER — PREDNISONE 20 MG/1
40 TABLET ORAL DAILY
Qty: 6 TABLET | Refills: 0 | Status: SHIPPED | OUTPATIENT
Start: 2021-12-10 | End: 2021-12-13

## 2021-12-17 ENCOUNTER — TELEPHONE (OUTPATIENT)
Dept: INTERNAL MEDICINE | Facility: CLINIC | Age: 81
End: 2021-12-17
Payer: COMMERCIAL

## 2022-01-27 ENCOUNTER — TELEPHONE (OUTPATIENT)
Dept: INTERNAL MEDICINE | Facility: CLINIC | Age: 82
End: 2022-01-27
Payer: COMMERCIAL

## 2022-01-27 DIAGNOSIS — C61 PROSTATE CANCER: ICD-10-CM

## 2022-01-27 DIAGNOSIS — R97.20 ELEVATED PSA: ICD-10-CM

## 2022-01-27 DIAGNOSIS — B20 HIV INFECTION, UNSPECIFIED SYMPTOM STATUS: ICD-10-CM

## 2022-01-27 DIAGNOSIS — I10 ESSENTIAL HYPERTENSION: ICD-10-CM

## 2022-01-27 DIAGNOSIS — Z00.00 ANNUAL PHYSICAL EXAM: Primary | ICD-10-CM

## 2022-01-27 DIAGNOSIS — K51.919 ULCERATIVE COLITIS WITH COMPLICATION, UNSPECIFIED LOCATION: ICD-10-CM

## 2022-01-27 NOTE — TELEPHONE ENCOUNTER
----- Message from Crystal Buitrago sent at 1/27/2022  3:05 PM CST -----   Type:  Patient Returning Call    Who Called:SUSAN LIMON JR.    Who Left Message for Patient:Ina Leslie MA    Does the patient know what this is regarding?:    Best Call Back Number:[phone]    Additional Information:

## 2022-01-27 NOTE — TELEPHONE ENCOUNTER
----- Message from Merlyn Ferrer sent at 1/27/2022  9:16 AM CST -----  Regarding: lab orders  Name of Who is Calling: sister Suarez           What is the request in detail: Daniela is requesting a call back to have all  annual lab orders and HIV panel order and call when done.           Can the clinic reply by [facility]: No           What Number to Call Back if not in [facility]: [phone]

## 2022-01-31 ENCOUNTER — LAB VISIT (OUTPATIENT)
Dept: LAB | Facility: OTHER | Age: 82
End: 2022-01-31
Attending: INTERNAL MEDICINE
Payer: COMMERCIAL

## 2022-01-31 DIAGNOSIS — R97.20 ELEVATED PSA: ICD-10-CM

## 2022-01-31 DIAGNOSIS — I10 ESSENTIAL HYPERTENSION: ICD-10-CM

## 2022-01-31 DIAGNOSIS — B20 HIV INFECTION, UNSPECIFIED SYMPTOM STATUS: ICD-10-CM

## 2022-01-31 DIAGNOSIS — C61 PROSTATE CANCER: ICD-10-CM

## 2022-01-31 DIAGNOSIS — Z00.00 ANNUAL PHYSICAL EXAM: ICD-10-CM

## 2022-01-31 DIAGNOSIS — K51.919 ULCERATIVE COLITIS WITH COMPLICATION, UNSPECIFIED LOCATION: ICD-10-CM

## 2022-01-31 LAB
ALBUMIN SERPL BCP-MCNC: 4 G/DL (ref 3.5–5.2)
ALP SERPL-CCNC: 126 U/L (ref 55–135)
ALT SERPL W/O P-5'-P-CCNC: 141 U/L (ref 10–44)
ANION GAP SERPL CALC-SCNC: 9 MMOL/L (ref 8–16)
AST SERPL-CCNC: 79 U/L (ref 10–40)
BASOPHILS # BLD AUTO: 0.07 K/UL (ref 0–0.2)
BASOPHILS NFR BLD: 0.8 % (ref 0–1.9)
BILIRUB SERPL-MCNC: 0.6 MG/DL (ref 0.1–1)
BUN SERPL-MCNC: 12 MG/DL (ref 8–23)
CALCIUM SERPL-MCNC: 9.6 MG/DL (ref 8.7–10.5)
CHLORIDE SERPL-SCNC: 105 MMOL/L (ref 95–110)
CHOLEST SERPL-MCNC: 138 MG/DL (ref 120–199)
CHOLEST/HDLC SERPL: 4.9 {RATIO} (ref 2–5)
CO2 SERPL-SCNC: 22 MMOL/L (ref 23–29)
COMPLEXED PSA SERPL-MCNC: 1.8 NG/ML (ref 0–4)
CREAT SERPL-MCNC: 1.1 MG/DL (ref 0.5–1.4)
DIFFERENTIAL METHOD: ABNORMAL
EOSINOPHIL # BLD AUTO: 0.6 K/UL (ref 0–0.5)
EOSINOPHIL NFR BLD: 7.5 % (ref 0–8)
ERYTHROCYTE [DISTWIDTH] IN BLOOD BY AUTOMATED COUNT: 12.1 % (ref 11.5–14.5)
EST. GFR  (AFRICAN AMERICAN): >60 ML/MIN/1.73 M^2
EST. GFR  (NON AFRICAN AMERICAN): >60 ML/MIN/1.73 M^2
ESTIMATED AVG GLUCOSE: 108 MG/DL (ref 68–131)
GLUCOSE SERPL-MCNC: 121 MG/DL (ref 70–110)
HBA1C MFR BLD: 5.4 % (ref 4–5.6)
HCT VFR BLD AUTO: 40.5 % (ref 40–54)
HDLC SERPL-MCNC: 28 MG/DL (ref 40–75)
HDLC SERPL: 20.3 % (ref 20–50)
HGB BLD-MCNC: 13.8 G/DL (ref 14–18)
IMM GRANULOCYTES # BLD AUTO: 0.04 K/UL (ref 0–0.04)
IMM GRANULOCYTES NFR BLD AUTO: 0.5 % (ref 0–0.5)
LDLC SERPL CALC-MCNC: 57.2 MG/DL (ref 63–159)
LYMPHOCYTES # BLD AUTO: 3.1 K/UL (ref 1–4.8)
LYMPHOCYTES NFR BLD: 38.1 % (ref 18–48)
MCH RBC QN AUTO: 33.4 PG (ref 27–31)
MCHC RBC AUTO-ENTMCNC: 34.1 G/DL (ref 32–36)
MCV RBC AUTO: 98 FL (ref 82–98)
MONOCYTES # BLD AUTO: 0.9 K/UL (ref 0.3–1)
MONOCYTES NFR BLD: 10.3 % (ref 4–15)
NEUTROPHILS # BLD AUTO: 3.5 K/UL (ref 1.8–7.7)
NEUTROPHILS NFR BLD: 42.8 % (ref 38–73)
NONHDLC SERPL-MCNC: 110 MG/DL
NRBC BLD-RTO: 0 /100 WBC
PLATELET # BLD AUTO: 283 K/UL (ref 150–450)
PMV BLD AUTO: 9.2 FL (ref 9.2–12.9)
POTASSIUM SERPL-SCNC: 3.9 MMOL/L (ref 3.5–5.1)
PROT SERPL-MCNC: 7.9 G/DL (ref 6–8.4)
RBC # BLD AUTO: 4.13 M/UL (ref 4.6–6.2)
SODIUM SERPL-SCNC: 136 MMOL/L (ref 136–145)
TRIGL SERPL-MCNC: 264 MG/DL (ref 30–150)
TSH SERPL DL<=0.005 MIU/L-ACNC: 3.62 UIU/ML (ref 0.4–4)
VIT B12 SERPL-MCNC: 1567 PG/ML (ref 210–950)
WBC # BLD AUTO: 8.25 K/UL (ref 3.9–12.7)

## 2022-01-31 PROCEDURE — 84153 ASSAY OF PSA TOTAL: CPT | Performed by: INTERNAL MEDICINE

## 2022-01-31 PROCEDURE — 82607 VITAMIN B-12: CPT | Performed by: INTERNAL MEDICINE

## 2022-01-31 PROCEDURE — 85025 COMPLETE CBC W/AUTO DIFF WBC: CPT | Performed by: INTERNAL MEDICINE

## 2022-01-31 PROCEDURE — 86361 T CELL ABSOLUTE COUNT: CPT | Performed by: INTERNAL MEDICINE

## 2022-01-31 PROCEDURE — 87536 HIV-1 QUANT&REVRSE TRNSCRPJ: CPT | Performed by: INTERNAL MEDICINE

## 2022-01-31 PROCEDURE — 84443 ASSAY THYROID STIM HORMONE: CPT | Performed by: INTERNAL MEDICINE

## 2022-01-31 PROCEDURE — 80061 LIPID PANEL: CPT | Performed by: INTERNAL MEDICINE

## 2022-01-31 PROCEDURE — 80053 COMPREHEN METABOLIC PANEL: CPT | Performed by: INTERNAL MEDICINE

## 2022-01-31 PROCEDURE — 83036 HEMOGLOBIN GLYCOSYLATED A1C: CPT | Performed by: INTERNAL MEDICINE

## 2022-02-01 ENCOUNTER — PATIENT MESSAGE (OUTPATIENT)
Dept: INTERNAL MEDICINE | Facility: CLINIC | Age: 82
End: 2022-02-01
Payer: COMMERCIAL

## 2022-02-01 DIAGNOSIS — C61 PROSTATE CANCER: Primary | ICD-10-CM

## 2022-02-01 LAB
CD3+CD4+ CELLS # BLD: 681 CELLS/UL (ref 300–1400)
CD3+CD4+ CELLS NFR BLD: 21.6 % (ref 28–57)

## 2022-02-03 LAB — HIV1 RNA # PLAS NAA DL=20: <20 COPIES/ML

## 2022-02-07 ENCOUNTER — OFFICE VISIT (OUTPATIENT)
Dept: UROLOGY | Facility: CLINIC | Age: 82
End: 2022-02-07
Attending: UROLOGY
Payer: COMMERCIAL

## 2022-02-07 VITALS
DIASTOLIC BLOOD PRESSURE: 74 MMHG | WEIGHT: 134.94 LBS | BODY MASS INDEX: 23.04 KG/M2 | HEIGHT: 64 IN | HEART RATE: 76 BPM | SYSTOLIC BLOOD PRESSURE: 128 MMHG

## 2022-02-07 DIAGNOSIS — C61 PROSTATE CANCER: Primary | ICD-10-CM

## 2022-02-07 PROCEDURE — 99499 NO LOS: ICD-10-PCS | Mod: S$GLB,,, | Performed by: UROLOGY

## 2022-02-07 PROCEDURE — 1126F PR PAIN SEVERITY QUANTIFIED, NO PAIN PRESENT: ICD-10-PCS | Mod: CPTII,S$GLB,, | Performed by: UROLOGY

## 2022-02-07 PROCEDURE — 99499 UNLISTED E&M SERVICE: CPT | Mod: S$GLB,,, | Performed by: UROLOGY

## 2022-02-07 PROCEDURE — 3074F SYST BP LT 130 MM HG: CPT | Mod: CPTII,S$GLB,, | Performed by: UROLOGY

## 2022-02-07 PROCEDURE — 3288F PR FALLS RISK ASSESSMENT DOCUMENTED: ICD-10-PCS | Mod: CPTII,S$GLB,, | Performed by: UROLOGY

## 2022-02-07 PROCEDURE — 1159F MED LIST DOCD IN RCRD: CPT | Mod: CPTII,S$GLB,, | Performed by: UROLOGY

## 2022-02-07 PROCEDURE — 1101F PT FALLS ASSESS-DOCD LE1/YR: CPT | Mod: CPTII,S$GLB,, | Performed by: UROLOGY

## 2022-02-07 PROCEDURE — 3078F DIAST BP <80 MM HG: CPT | Mod: CPTII,S$GLB,, | Performed by: UROLOGY

## 2022-02-07 PROCEDURE — 3074F PR MOST RECENT SYSTOLIC BLOOD PRESSURE < 130 MM HG: ICD-10-PCS | Mod: CPTII,S$GLB,, | Performed by: UROLOGY

## 2022-02-07 PROCEDURE — 1101F PR PT FALLS ASSESS DOC 0-1 FALLS W/OUT INJ PAST YR: ICD-10-PCS | Mod: CPTII,S$GLB,, | Performed by: UROLOGY

## 2022-02-07 PROCEDURE — 3078F PR MOST RECENT DIASTOLIC BLOOD PRESSURE < 80 MM HG: ICD-10-PCS | Mod: CPTII,S$GLB,, | Performed by: UROLOGY

## 2022-02-07 PROCEDURE — 3288F FALL RISK ASSESSMENT DOCD: CPT | Mod: CPTII,S$GLB,, | Performed by: UROLOGY

## 2022-02-07 PROCEDURE — 1126F AMNT PAIN NOTED NONE PRSNT: CPT | Mod: CPTII,S$GLB,, | Performed by: UROLOGY

## 2022-02-07 PROCEDURE — 96402 PR CHEMOTHER HORMON ANTINEOPL SUB-Q/IM: ICD-10-PCS | Mod: S$GLB,,, | Performed by: UROLOGY

## 2022-02-07 PROCEDURE — 1159F PR MEDICATION LIST DOCUMENTED IN MEDICAL RECORD: ICD-10-PCS | Mod: CPTII,S$GLB,, | Performed by: UROLOGY

## 2022-02-07 PROCEDURE — 96402 CHEMO HORMON ANTINEOPL SQ/IM: CPT | Mod: S$GLB,,, | Performed by: UROLOGY

## 2022-02-07 NOTE — PROGRESS NOTES
Patient in clinic received injection today. Tolerated well denies any discomfort secured with band aide. Patient to RTC as scheduled       Eligard(Leuprolide acetate) 45 mg  Site: Right Abdomen

## 2022-02-07 NOTE — PROGRESS NOTES
"Subjective:      Aroldo Johnston Jr. is a 81 y.o. male who returns today regarding his prostate cancer, UPJ obstruction, balanitis, and stones.    Prior  history as per below.     He has no LUTS or c/o today.    Last Eligard 6 mos ago. Plan for repeat today and then likely hold.    Prostate Cancer  He states that [place] [facility] [place], [place] 3+3=6, PSA 16.5.  He states he was treated with radiation therapy with no known recurrence.  PSA sachin 0.97 in March 2009.  PSA slowly began to rise in 2011 and then more rapidly to 4.9 in Aug 2015, when he was referred to me.  Began ADT in 2016 and later opted for intermittent therapy. Last injection 3/3/16. PSA increased again in 2020 and he restarted ADT in May 2020 but did not FU for over 1 year.    UPJ Obstruction  He was diagnosed w/ left UPJ obstruction in 2014 after presenting with large renal stone.  He had robotic pyeloplasty in August 2014.  He did not FU for stent removal, which was noted on CT in September 2015.  Stent exchanged in OR on 9/24/15 and subsequently removed in clinic.  FU US demonstrated stable, likely chronic hydronephrosis. Cr is within normal limits.     The following portions of the patient's history were reviewed and updated as appropriate: allergies, current medications, past family history, past medical history, past social history, past surgical history and problem list.    Review of Systems  A comprehensive multipoint review of systems was negative except as otherwise stated in the HPI.     Objective:   Vitals:   /74 (BP Location: Left arm, Patient Position: Sitting, BP Method: Large (Automatic))   Pulse 76   Ht 5' 4" (1.626 m)   Wt 61.2 kg (134 lb 14.7 oz)   BMI 23.16 kg/m²     Physical Exam   General: alert and oriented, no acute distress  Respiratory: Symmetric expansion, non-labored breathing  Abdomen: soft, nontender, nondistended  : uncircumcised penis; mild balanitis, smegma " present; testicular and scrotal exam normal; prostate with expected post-radiation changes  Neuro: no gross deficits; poor memory  Psych: normal judgment and insight, normal mood/affect and non-anxious    Lab Review   Component PSA DIAGNOSTIC   Latest Ref Rng & Units 0.00 - 4.00 ng/mL   1/31/2022 1.8   6/17/2021 9.7 (H)   5/4/2020 9.0 (H)   8/12/2019 8.2 (H)   1/7/2019 6.1 (H)   6/25/2018 6.5 (H)   3/22/2018 6.4 (H)   9/18/2018 2.8   3/20/2017 0.86   9/16/2016 0.03   7/1/2016 0.05   3/29/2016 0.31   1/6/2016 8.0 (H)     Component      Latest Ref Rng & Units 6/17/2021   Testosterone, Total      304 - 1227 ng/dL 581     Assessment and Plan:   Prostate cancer  Elevated PSA  -- Continue ADT (intermittent) today with Eligard 45mg  -- Will check PSA in 6 mos and continue ADT only if rising    Hydronephrosis, left  -- Stable s/p left pyeloplasty    I spent a total of 30 minutes on the day of the visit.  This includes face to face time and non-face to face time preparing to see the patient (eg, review of tests), obtaining and/or reviewing separately obtained history, documenting clinical information in the electronic or other health record, independently interpreting results and communicating results to the patient/family/caregiver, or care coordinator.

## 2022-02-11 NOTE — ED NOTES
ADMISSION NOTE    81year old male with HTN COPD c/o about 1 month of abdominal pain which is now severe and associated with nausea and emesis. Work up in ED revealed L sided renal infarct. Available medical records partially reviewed. Dictation to follow.     Nagi Trevino M.D.  2/11/2022 ENT at bedside.

## 2022-02-24 ENCOUNTER — OFFICE VISIT (OUTPATIENT)
Dept: INTERNAL MEDICINE | Facility: CLINIC | Age: 82
End: 2022-02-24
Attending: INTERNAL MEDICINE
Payer: COMMERCIAL

## 2022-02-24 VITALS
BODY MASS INDEX: 24.65 KG/M2 | SYSTOLIC BLOOD PRESSURE: 130 MMHG | DIASTOLIC BLOOD PRESSURE: 78 MMHG | OXYGEN SATURATION: 95 % | HEIGHT: 64 IN | WEIGHT: 144.38 LBS | HEART RATE: 75 BPM

## 2022-02-24 DIAGNOSIS — I25.10 CAD IN NATIVE ARTERY: ICD-10-CM

## 2022-02-24 DIAGNOSIS — K51.919 ULCERATIVE COLITIS WITH COMPLICATION, UNSPECIFIED LOCATION: ICD-10-CM

## 2022-02-24 DIAGNOSIS — C61 PROSTATE CANCER: Primary | ICD-10-CM

## 2022-02-24 DIAGNOSIS — I10 ESSENTIAL HYPERTENSION: ICD-10-CM

## 2022-02-24 DIAGNOSIS — F01.518 VASCULAR DEMENTIA WITH BEHAVIOR DISTURBANCE: ICD-10-CM

## 2022-02-24 DIAGNOSIS — B20 HIV INFECTION, UNSPECIFIED SYMPTOM STATUS: ICD-10-CM

## 2022-02-24 PROCEDURE — 1126F PR PAIN SEVERITY QUANTIFIED, NO PAIN PRESENT: ICD-10-PCS | Mod: CPTII,S$GLB,, | Performed by: INTERNAL MEDICINE

## 2022-02-24 PROCEDURE — 1159F MED LIST DOCD IN RCRD: CPT | Mod: CPTII,S$GLB,, | Performed by: INTERNAL MEDICINE

## 2022-02-24 PROCEDURE — 99999 PR PBB SHADOW E&M-EST. PATIENT-LVL III: ICD-10-PCS | Mod: PBBFAC,,, | Performed by: INTERNAL MEDICINE

## 2022-02-24 PROCEDURE — 1126F AMNT PAIN NOTED NONE PRSNT: CPT | Mod: CPTII,S$GLB,, | Performed by: INTERNAL MEDICINE

## 2022-02-24 PROCEDURE — 3288F FALL RISK ASSESSMENT DOCD: CPT | Mod: CPTII,S$GLB,, | Performed by: INTERNAL MEDICINE

## 2022-02-24 PROCEDURE — 99999 PR PBB SHADOW E&M-EST. PATIENT-LVL III: CPT | Mod: PBBFAC,,, | Performed by: INTERNAL MEDICINE

## 2022-02-24 PROCEDURE — 3078F PR MOST RECENT DIASTOLIC BLOOD PRESSURE < 80 MM HG: ICD-10-PCS | Mod: CPTII,S$GLB,, | Performed by: INTERNAL MEDICINE

## 2022-02-24 PROCEDURE — 99214 OFFICE O/P EST MOD 30 MIN: CPT | Mod: S$GLB,,, | Performed by: INTERNAL MEDICINE

## 2022-02-24 PROCEDURE — 3288F PR FALLS RISK ASSESSMENT DOCUMENTED: ICD-10-PCS | Mod: CPTII,S$GLB,, | Performed by: INTERNAL MEDICINE

## 2022-02-24 PROCEDURE — 1101F PT FALLS ASSESS-DOCD LE1/YR: CPT | Mod: CPTII,S$GLB,, | Performed by: INTERNAL MEDICINE

## 2022-02-24 PROCEDURE — 3078F DIAST BP <80 MM HG: CPT | Mod: CPTII,S$GLB,, | Performed by: INTERNAL MEDICINE

## 2022-02-24 PROCEDURE — 1159F PR MEDICATION LIST DOCUMENTED IN MEDICAL RECORD: ICD-10-PCS | Mod: CPTII,S$GLB,, | Performed by: INTERNAL MEDICINE

## 2022-02-24 PROCEDURE — 99214 PR OFFICE/OUTPT VISIT, EST, LEVL IV, 30-39 MIN: ICD-10-PCS | Mod: S$GLB,,, | Performed by: INTERNAL MEDICINE

## 2022-02-24 PROCEDURE — 1101F PR PT FALLS ASSESS DOC 0-1 FALLS W/OUT INJ PAST YR: ICD-10-PCS | Mod: CPTII,S$GLB,, | Performed by: INTERNAL MEDICINE

## 2022-02-24 PROCEDURE — 3075F PR MOST RECENT SYSTOLIC BLOOD PRESS GE 130-139MM HG: ICD-10-PCS | Mod: CPTII,S$GLB,, | Performed by: INTERNAL MEDICINE

## 2022-02-24 PROCEDURE — 3075F SYST BP GE 130 - 139MM HG: CPT | Mod: CPTII,S$GLB,, | Performed by: INTERNAL MEDICINE

## 2022-02-24 NOTE — PROGRESS NOTES
"       Patient ID: Aroldo Johnston Jr. is a 81 y.o. male.    Chief Complaint: Annual Exam    76 yo M c/ PMHx of ulcerative colitis, HIV, recurrent prostate CA s/p prostatectomy, worsening dementia, b12 deficiency presents for routine exam       Continues to   His sister Gabby is present who provides reliable Hx. Labs drawn prior to appt and reviewed.    Short term memory continues to gradually decline.   Hx recurrent prostate CA;Has appt with urology. Stool incontinence continues. Needs colonoscopy before tx can be initiated. Baseline dementia. Continued diarrhea throughout the day. Sister and [facility] able to get pt to schedule colonoscopy but pt refused to go through with it. No new pains, weight loss, constipation, dizziness, SOB, DALLAS, PND, CP      Review of Systems   Unable to perform ROS: Dementia       Objective:      Vitals:    02/24/22 1247 02/24/22 1318   BP: (!) 152/80 130/78   Pulse: 75    SpO2: 95%    Weight: 65.5 kg (144 lb 6.4 oz)    Height: 5' 4" (1.626 m)       Physical Exam  Constitutional:       General: He is not in acute distress.     Appearance: He is well-developed.   HENT:      Head: Normocephalic and atraumatic.      Mouth/Throat:      Pharynx: No oropharyngeal exudate.   Eyes:      General: No scleral icterus.     Conjunctiva/sclera: Conjunctivae normal.      Pupils: Pupils are equal, round, and reactive to light.   Neck:      Thyroid: No thyromegaly.   Cardiovascular:      Rate and Rhythm: Normal rate and regular rhythm.      Heart sounds: Normal heart sounds. No murmur heard.  Pulmonary:      Effort: Pulmonary effort is normal.      Breath sounds: Normal breath sounds. No wheezing or rales.   Abdominal:      General: There is no distension.      Palpations: Abdomen is soft.      Tenderness: There is no abdominal tenderness.   Musculoskeletal:         General: No tenderness.   Lymphadenopathy:      Cervical: No cervical adenopathy.   Skin:     General: Skin is warm and dry. "   Neurological:      Mental Status: He is alert.   Psychiatric:         Behavior: Behavior is not agitated, slowed, aggressive, hyperactive or combative.         Thought Content: Thought content is not paranoid.         Cognition and Memory: He exhibits impaired recent memory and impaired remote memory.         Assessment:       1. Prostate cancer    2. HIV infection, unspecified symptom status    3. CAD in native artery    4. Vascular dementia with behavior disturbance    5. Essential hypertension    6. Ulcerative colitis with complication, unspecified location        Plan:       Aroldo was seen today for annual exam.    Diagnoses and all orders for this visit:    Prostate cancer   F/u urology    HIV infection, unspecified symptom status   Up-to-date labs controlled.  Follow-up ID  CAD in native artery   Discussed medication and dietary adherence. Discussed monitoring daily weights in conjunction with leg edema and orthopnea/DALLAS symptoms. If morning weight increases >6 lbs in one day or >3lbs for 3 consecutive days patient is to double diuretic (furosemide, torsemide, bumetanide) for 2-3 days or until weight decreases or at least stabilizes. Contact office if taking increased dose of diuretic for greater than a 5-7 days.    Vascular dementia with behavior disturbance    Essential hypertension   Controlled and asymptomatic.  Continue current Rx regimen.    Ulcerative colitis with complication, unspecified location           Cory Pena MD  Internal Medicine-[facility]        Side effects of medication(s) were discussed in detail and patient voiced understanding.  Patient will call back for any issues or complications.

## 2022-03-07 ENCOUNTER — TELEPHONE (OUTPATIENT)
Dept: INTERNAL MEDICINE | Facility: CLINIC | Age: 82
End: 2022-03-07
Payer: COMMERCIAL

## 2022-03-07 NOTE — TELEPHONE ENCOUNTER
Pt came into the clinic requesting most recent lab results. I printed from 12/2021 to current. Pt and pt wife were pleased and thankful.

## 2022-03-14 PROBLEM — F01.518 VASCULAR DEMENTIA WITH BEHAVIOR DISTURBANCE: Status: ACTIVE | Noted: 2022-03-14

## 2022-04-04 ENCOUNTER — TELEPHONE (OUTPATIENT)
Dept: INTERNAL MEDICINE | Facility: CLINIC | Age: 82
End: 2022-04-04
Payer: COMMERCIAL

## 2022-04-04 NOTE — TELEPHONE ENCOUNTER
Received prescription refill for Ferrous Sulphate. Left message for patient to call  as chart is stating he is not taking this medication.

## 2022-04-07 ENCOUNTER — TELEPHONE (OUTPATIENT)
Dept: INTERNAL MEDICINE | Facility: CLINIC | Age: 82
End: 2022-04-07
Payer: COMMERCIAL

## 2022-04-07 NOTE — TELEPHONE ENCOUNTER
----- Message from Sophia Mcknight sent at 4/7/2022  2:58 PM CDT -----  Contact: Valencia Ramirez (Sister)  Type: Call Back    Who called:  Valencia Ramirez (Sister)    What is the request in detail: Patient is requesting a call back in regards to the ferrous sulfate (FEOSOL) 325 mg (65 mg iron) Tab tablet. She would like to verify if the patient is to take this medication or not. Please advise.     Can the clinic reply by [facility]? No    Would the patient rather a call back or a response via [facility]? Call back     Best call back number: [phone]    Additional Information:

## 2022-04-11 DIAGNOSIS — Z00.00 ANNUAL PHYSICAL EXAM: Primary | ICD-10-CM

## 2022-04-11 RX ORDER — FERROUS SULFATE 325(65) MG
TABLET ORAL
Qty: 180 TABLET | Refills: 2 | Status: SHIPPED | OUTPATIENT
Start: 2022-04-11

## 2022-04-11 NOTE — TELEPHONE ENCOUNTER
This message from MD was given to Valencia who is patient's sister. States her brother needs a prescription refill of the medication. LOV 02/24/22          From Dr. Gardner  Yes please take iron      Do you want pt to take Feosol? Evidently there is some confusion as to this.           Contact: Valencia Ramirez (Sister)  Type: Call Back     Who called:  Valencia Ramirez (Sister)     What is the request in detail: Patient is requesting a call back in regards to the ferrous sulfate (FEOSOL) 325 mg (65 mg iron) Tab tablet. She would like to verify if the patient is to take this medication or not. Please advise.      Can the clinic reply by [facility]? No     Would the patient rather a call back or a response via [facility]? Call back      Best call back number: [phone]     Additional Information:

## 2022-04-14 DIAGNOSIS — K51.919 ULCERATIVE COLITIS WITH COMPLICATION, UNSPECIFIED LOCATION: ICD-10-CM

## 2022-04-14 NOTE — TELEPHONE ENCOUNTER
No new care gaps identified.  Powered by [employer]. Reference number: [id number].   4/14/2022 2:57:11 PM CDT

## 2022-04-14 NOTE — TELEPHONE ENCOUNTER
Paper Prescription for Mirtazapine 15 mg     received from [facility]  Last Office Visit  02/24/2022       Thank You

## 2022-04-15 ENCOUNTER — NURSE TRIAGE (OUTPATIENT)
Dept: ADMINISTRATIVE | Facility: CLINIC | Age: 82
End: 2022-04-15
Payer: COMMERCIAL

## 2022-04-15 ENCOUNTER — TELEPHONE (OUTPATIENT)
Dept: FAMILY MEDICINE | Facility: CLINIC | Age: 82
End: 2022-04-15
Payer: COMMERCIAL

## 2022-04-15 DIAGNOSIS — K51.919 ULCERATIVE COLITIS WITH COMPLICATION, UNSPECIFIED LOCATION: ICD-10-CM

## 2022-04-15 RX ORDER — MIRTAZAPINE 15 MG/1
TABLET, FILM COATED ORAL
Qty: 30 TABLET | Refills: 0 | Status: SHIPPED | OUTPATIENT
Start: 2022-04-15

## 2022-04-15 NOTE — TELEPHONE ENCOUNTER
No new care gaps identified.  Powered by [employer]. Reference number: [id number].   4/15/2022 8:03:36 AM CDT

## 2022-04-15 NOTE — TELEPHONE ENCOUNTER
[place]    PCP:  [id number] calling in regards to a refill for Remeron 15 mg po Q HS which was ordered by Dr. Gardner.  Per protocol, care advised is call PCP now.  NOC spoke with Dr. Welch, OCP, for Dr. Gardner.  OCP given pt info and will send a prescription to the Pharmacy.  Pharmacy contacted and notified that prescription will be sent by the provider via e-scribe.  VU.    Reason for Disposition   [1] Pharmacy calling with prescription questions AND [2] triager unable to answer question    Additional Information   Negative: New-onset or worsening symptoms, see that guideline  (e.g., diarrhea, runny nose, sore throat)   Negative: Medicine question not related to refill or renewal   Negative: Caller requesting information unrelated to medicine   Negative: [1] Prescription refill request for ESSENTIAL medicine (i.e., likelihood of harm to patient if not taken) AND [2] triager unable to refill per department policy   Negative: [1] Prescription not at pharmacy AND [2] was prescribed by PCP recently (Exception: triager has access to EMR and prescription is recorded there. Go to Home Care and confirm for pharmacy.)    Protocols used: MEDICATION REFILL AND RENEWAL CALL-A-

## 2022-04-16 NOTE — TELEPHONE ENCOUNTER
----- Message from Cheryl Veras sent at 4/14/2022  5:18 PM CDT -----  Contact: SUSAN LIMON JR. [[id number]] [phone]  Type:  RX Refill Request    Who Called: SUSAN LIMON JR. [[id number]]  Refill or New Rx: Refill  RX Name and Strength: mirtazapine (REMERON) 15 MG tablet  How is the patient currently taking it? (ex. 1XDay): TAKE 1 TABLET(15 MG) BY MOUTH EVERY EVENING  Is this a 30 day or 90 day RX: 60  Preferred Pharmacy with phone number[id number] [place], [place] - [address]  [address] [facility], [phone]  Local or Mail Order: Local  Ordering Provider: Cory Gardner MD  Would the patient rather a call back or a response via [facility]? Phone call   Best Call Back Number: [phone]  Additional Information: Patient indicated he needed a refill before the weekend was up, so was given the phone number to nurse on call.

## 2022-04-17 DIAGNOSIS — K51.919 ULCERATIVE COLITIS WITH COMPLICATION, UNSPECIFIED LOCATION: ICD-10-CM

## 2022-04-17 RX ORDER — MIRTAZAPINE 15 MG/1
TABLET, FILM COATED ORAL
Qty: 60 TABLET | Refills: 3 | OUTPATIENT
Start: 2022-04-17

## 2022-04-17 RX ORDER — MIRTAZAPINE 15 MG/1
TABLET, FILM COATED ORAL
Qty: 60 TABLET | Refills: 3 | Status: SHIPPED | OUTPATIENT
Start: 2022-04-17

## 2022-04-17 NOTE — TELEPHONE ENCOUNTER
No new care gaps identified.  Powered by [employer]. Reference number: [id number].   4/17/2022 8:10:30 AM CDT

## 2022-04-18 RX ORDER — MIRTAZAPINE 15 MG/1
TABLET, FILM COATED ORAL
Qty: 30 TABLET | Refills: 0 | OUTPATIENT
Start: 2022-04-18

## 2022-04-18 NOTE — TELEPHONE ENCOUNTER
Quick DC. Request already responded to by other means (e.g. phone or fax)   Refill Authorization Note   Aroldo Preston  is requesting a refill authorization.  Brief Assessment and Rationale for Refill:  Quick Discontinue  Medication Therapy Plan:       Medication Reconciliation Completed:  No      Comments:     Note composed:1:08 PM 04/18/2022

## 2022-10-17 NOTE — PROGRESS NOTES
"Subjective:      Aroldo Johnston Jr. is a 78 y.o. male who returns today regarding his prostate cancer, UPJ obstruction, balanitis, and stones.    Prior  history as per below. Here to review PSA.    Doing well today with no c/o.     Prostate Cancer  He states that [place] [facility] [place], Alexia 3+3=6, PSA 16.5.  He states he was treated with radiation therapy with no known recurrence.  PSA sachin 0.97 in March 2009.  PSA slowly began to rise in 2011 and then more rapidly to 4.9 in Aug 2015, when he was referred to me.  Began ADT in 2016 and later opted for intermittent therapy. Last injection 3/3/16. PSA has remained low since.    UPJ Obstruction  He was diagnosed w/ left UPJ obstruction in 2014 after presenting with large renal stone.  He had robotic pyeloplasty in August 2014.  He did not FU for stent removal, which was noted on CT in September 2015.  Stent exchanged in OR on 9/24/15 and subsequently removed in clinic.  FU US demonstrated stable, likely chronic hydronephrosis.    The following portions of the patient's history were reviewed and updated as appropriate: allergies, current medications, past family history, past medical history, past social history, past surgical history and problem list.    Review of Systems  A comprehensive multipoint review of systems was negative except as otherwise stated in the HPI.     Objective:   Vitals:   BP (!) 156/78 (BP Location: Right arm, Patient Position: Sitting, BP Method: Medium (Automatic))   Pulse 94   Ht 5' 3" (1.6 m)   Wt 62.1 kg (136 lb 12.7 oz)   BMI 24.23 kg/m²     Physical Exam   General: alert and oriented, no acute distress  Respiratory: Symmetric expansion, non-labored breathing  Neuro: no gross deficits; poor memory  Psych: normal judgment and insight, normal mood/affect and non-anxious    Lab Review   Component PSA DIAGNOSTIC   Latest Ref Rng & Units 0.00 - 4.00 ng/mL   1/7/2019 6.1 (H)   6/25/2018 6.5 (H) "   3/22/2018 6.4 (H)   9/18/2018 2.8   3/20/2017 0.86   9/16/2016 0.03   7/1/2016 0.05   3/29/2016 0.31   1/6/2016 8.0 (H)       Assessment and Plan:   Balanitis  -- Resolved    Prostate cancer  Elevated PSA  -- PSA rise noted, however has been stable for past year  -- Intermittent ADT as indicated - plan for PSA > 10    Hydronephrosis, left  -- Stable s/p left pyeloplasty    FU 6 mos w/ PSA   ambulatory

## 2023-04-24 ENCOUNTER — PATIENT MESSAGE (OUTPATIENT)
Dept: ADMINISTRATIVE | Facility: HOSPITAL | Age: 83
End: 2023-04-24
Payer: COMMERCIAL

## 2023-06-22 ENCOUNTER — PATIENT OUTREACH (OUTPATIENT)
Dept: ADMINISTRATIVE | Facility: HOSPITAL | Age: 83
End: 2023-06-22
Payer: COMMERCIAL

## 2024-07-30 NOTE — TELEPHONE ENCOUNTER
Returned patient's call. GI appointment scheduled per patient's request. Patient is requesting sooner appointment, pt. Placed on wait list.    sudden onset